# Patient Record
Sex: FEMALE | Race: WHITE | NOT HISPANIC OR LATINO | Employment: UNEMPLOYED | URBAN - METROPOLITAN AREA
[De-identification: names, ages, dates, MRNs, and addresses within clinical notes are randomized per-mention and may not be internally consistent; named-entity substitution may affect disease eponyms.]

---

## 2017-05-25 LAB
ABO GROUP BLD: NORMAL
BLD GP AB SCN SERPL QL: NORMAL
HBV SURFACE AG SERPL QL IA: NORMAL
HCT VFR BLD AUTO: NORMAL %
HGB BLD-MCNC: NORMAL G/DL
HIV 1 0 2 IC ZHVIC: NON REACTIVE
PLATELET # BLD AUTO: NORMAL 10*3/UL
RH BLD: NORMAL
RPR SER QL: NON REACTIVE
RUBV IGG SERPL IA-ACNC: NORMAL

## 2017-06-08 LAB
C TRACH DNA SPEC QL NAA+PROBE: NORMAL
N GONORRHOEA DNA SPEC QL NAA+PROBE: NORMAL
PHYSICIAN READ PAP  881411: NORMAL

## 2017-06-30 LAB — 2ND TRIMESTER 4 SCREEN SERPL-IMP: NORMAL

## 2017-08-30 LAB
GLUCOSE 1H P 50 G GLC PO SERPL-MCNC: NORMAL MG/DL
HCT VFR BLD AUTO: NORMAL %
HGB BLD-MCNC: NORMAL G/DL
PLATELET # BLD AUTO: NORMAL 10*3/UL

## 2017-10-18 ENCOUNTER — INITIAL PRENATAL (OUTPATIENT)
Dept: OBGYN | Facility: CLINIC | Age: 27
End: 2017-10-18
Payer: MEDICAID

## 2017-10-18 VITALS
WEIGHT: 196 LBS | HEIGHT: 67 IN | SYSTOLIC BLOOD PRESSURE: 126 MMHG | BODY MASS INDEX: 30.76 KG/M2 | DIASTOLIC BLOOD PRESSURE: 62 MMHG

## 2017-10-18 DIAGNOSIS — Z34.03 ENCOUNTER FOR SUPERVISION OF NORMAL FIRST PREGNANCY IN THIRD TRIMESTER: ICD-10-CM

## 2017-10-18 LAB
APPEARANCE UR: NORMAL
BILIRUB UR STRIP-MCNC: NORMAL MG/DL
COLOR UR AUTO: NORMAL
GLUCOSE UR STRIP.AUTO-MCNC: NEGATIVE MG/DL
KETONES UR STRIP.AUTO-MCNC: NEGATIVE MG/DL
LEUKOCYTE ESTERASE UR QL STRIP.AUTO: NEGATIVE
NITRITE UR QL STRIP.AUTO: NEGATIVE
PH UR STRIP.AUTO: 6.5 [PH] (ref 5–8)
PROT UR QL STRIP: NORMAL MG/DL
RBC UR QL AUTO: NORMAL
SP GR UR STRIP.AUTO: 1.01
UROBILINOGEN UR STRIP-MCNC: NORMAL MG/DL

## 2017-10-18 PROCEDURE — 59401 PR NEW OB VISIT: CPT | Performed by: NURSE PRACTITIONER

## 2017-10-18 PROCEDURE — 90472 IMMUNIZATION ADMIN EACH ADD: CPT | Performed by: NURSE PRACTITIONER

## 2017-10-18 PROCEDURE — 90471 IMMUNIZATION ADMIN: CPT | Performed by: NURSE PRACTITIONER

## 2017-10-18 PROCEDURE — 81002 URINALYSIS NONAUTO W/O SCOPE: CPT | Performed by: NURSE PRACTITIONER

## 2017-10-18 PROCEDURE — 90686 IIV4 VACC NO PRSV 0.5 ML IM: CPT | Performed by: NURSE PRACTITIONER

## 2017-10-18 PROCEDURE — 90715 TDAP VACCINE 7 YRS/> IM: CPT | Performed by: NURSE PRACTITIONER

## 2017-10-18 NOTE — PROGRESS NOTES
"Cc: New OB visit    HPI:  The patient is a 27 y.o.  32w3d based upo  Patient's last menstrual period was 2017. JUD 12/10/17, U/S at 12 2/7 17, JUD 17   She presents for her new obstetric visit.  She Reports  fetal movement,  denies  vaginal bleeding,  denies  leakage of fluid,  denies contractions.   She Denies nausea/vomiting, denies headache, denies dysuria, Denies vaginal discharge.  Father of baby involved:yes, family support: yes    OB History    Para Term  AB Living   3 2 2     2   SAB TAB Ectopic Molar Multiple Live Births             2      # Outcome Date GA Lbr Trevor/2nd Weight Sex Delivery Anes PTL Lv   3 Current            2 Term 11 39w0d  3.6 kg (7 lb 15 oz) F Vag-Spont EPI N JESSE      Birth Comments: Pt states no complications.    1 Term 10/18/08 39w0d  3.657 kg (8 lb 1 oz) M Vag-Spont EPI N JESSE      Birth Comments: Pt states no complications.         No past medical history on file.  No past surgical history on file.  Social History     Social History   • Marital status: Single     Spouse name: N/A   • Number of children: N/A   • Years of education: N/A     Occupational History   • Not on file.     Social History Main Topics   • Smoking status: Never Smoker   • Smokeless tobacco: Not on file   • Alcohol use No   • Drug use: No   • Sexual activity: Yes     Partners: Male      Comment: none     Other Topics Concern   • Not on file     Social History Narrative   • No narrative on file     Family History   Problem Relation Age of Onset   • Diabetes Mother    • Hypertension Mother    • Diabetes Father    • Hypertension Father    • No Known Problems Sister    • Diabetes Paternal Grandfather    • Hypertension Paternal Grandfather      Allergies:   Allergies as of 10/18/2017   • (No Known Allergies)       PE:    Blood pressure 126/62, height 1.702 m (5' 7\"), weight 88.9 kg (196 lb), last menstrual period 2017.    SVE:  No  Assessed today late transfer of care from " [facility]      see prenatal physical/limited exam     LABS: Pending: need report for 1 hour GTT and a recent U/S to recheck fetal kidneys per patient done at [facility]    A/P:  27 y.o.  32w3d based upon  Patient's last menstrual period was 2017..  She is here for her new obstetric visit.    1. Encounter for supervision of normal first pregnancy in third trimester  INFLUENZA VACCINE QUAD INJ >3Y(PF)    TDAP VACCINE =>6YO IM    Prenatal MV-Min-Fe Fum-FA-DHA (PRENATAL 1 PO)    POCT Urinalysis       1. Ultrasound for dates and anatomy complete. Bilateral fetal pylectesis repeat scan done at [facility] will request the records   2.  trimester screening for Down Syndrome and neural tube defects done negative screen  3.   prenatal labs complete except 1 hour gtt missing.  4.  NOB packet given with informational handouts.  5.  Increase water intake and encouraged healthy nutrition.  6.  Discussion of weight gain and proper exercise during pregnancy.  7.  Continue prenatal vitamins.  8.  Follow-up in 2 weeks.   9. Flu and Tdap vaccines are given today

## 2017-10-18 NOTE — LETTER
Cystic Fibrosis Carrier Testing  Sierra Villegas    The following information is about a blood test that can be done to determine if you and/or your partner carry the gene for cystic fibrosis.    WHAT IS CYSTIC FIBROSIS?  · Cystic fibrosis (CF) is an inherited disease that affects more than 25,000 [place] children and young adults.  · Symptoms of CF vary but include lung congestion, pneumonia, diarrhea and poor growth.  Most people with CF have severe medical problems and some die at a young age.  Others have so few symptoms they are unaware they have CF.  · CF does not affect intelligence.  · Although there is no cure for CF at this time, scientists are making progress in improving treatment and in searching for a cure.  In the past many people with CF  at a very young age.  Today, many are living into their 20’s and 30’s.    IS THERE A CHANCE MY BABY COULD HAVE CYSTIC FIBROSIS?  · You can have a child with CF even if there is no history in your family (see chart below).  · CF testing can help determine if you are a carrier and at risk to have a child with CF.  Note: if both parents are carriers, there is a 1 in 4 (25%) chance with each pregnancy that they will have a child with CF.  · Carriers have one normal CF gene and one altered CF gene.  · People with CF have two altered CF genes.  · Most people have two normal copies of the CF gene.    Approximate risk that a couple with no family history of cystic fibrosis will have a child with cystic fibrosis:    Ethnic background / Risk     couple:  1 in 2,500   couple:  1 in 15,000            couple:  1 in 8,000     American couple:  1 in 32,000     WHAT TESTING IS AVAILABLE?  · There is a blood test that can be done to find out if you or your partner is a carrier.  · It is important to understand that CF carrier testing does not detect all CF carriers.  · If the test shows that you are both CF carriers, you unborn baby can  be tested to find out if the baby has CF.    HOW MUCH DOES IT COST TO HAVE CYSTIC FIBROSIS CARRIER TESTING?  · Cost and insurance coverage for CF carrier testing vary depending upon the laboratory used and your insurance policy.  · The average cost for CF carrier testing is $300 per person.  · Your genetic counselor can provide you with more information about cystic fibrosis carrier testing.    _____  Yes, I am interested in discussing carrier testing with a genetic counselor.    _____  No, I am not interested in CF carrier testing or in receiving more information about CF carrier testing.      Client signature: ________________________________________  10/18/2017

## 2017-10-18 NOTE — PROGRESS NOTES
Pt. Here for NOB visit today.  Good FM, kick count sheet given along with instructions today.   # [phone]  Pt is transfer of care from [facility] records received.   Pt. States no complaints.   Pharmacy verified.   Tdap today   Flu vaccine today   BTL declined.

## 2017-10-18 NOTE — LETTER
"Count Your Baby's Movements  Another step to a healthy delivery    Sierra Martínezrhoda             Dept: [phone]    How Many Weeks Pregnant 32w3d    Date to Begin Counting: 10/18/17              How to use this chart    One way for your physician to keep track of your baby's health is by knowing how often the baby moves (or \"kicks\") in your womb.  You can help your physician to do this by using this chart every day.    Every day, you should see how many hours it takes for your baby to move 10 times.  Start in the morning, as soon as you get up.    · First, write down the time your baby moves until you get to 10.  · Check off one box every time your baby moves until you get to 10.  · Write down the time you finished counting in the last column.  · Total how long it took to count up all 10 movements.  · Finally, fill in the box that shows how long this took.  After counting 10 movements, you no longer have to count any more that day.  The next morning, just start counting again as soon as you get up.    What should you call a \"movement\"?  It is hard to say, because it will feel different from one mother to another and from one pregnancy to the next.  The important thing is that you count the movements the same way throughout your pregnancy.  If you have more questions, you should ask your physician.    Count carefully every day!  SAMPLE:  Week 28    How many hours did it take to feel 10 movements?       Start  Time     1     2     3     4     5     6     7     8     9     10   Finish Time   Mon 8:20 ·  ·  ·  ·  ·  ·  ·  ·  ·  ·  11:40   Tue Wed Thu Fri               Sat               Sun                 IMPORTANT: You should contact your physician if it takes more than two hours for you to feel 10 movements.  Each morning, write down the time and start to count the movements of your baby.  Keep track by checking off one box every time you feel one movement.  When you have " "felt 10 \"kicks\", write down the time you finished counting in the last column.  Then fill in the   box (over the check jag) for the number of hours it took.  Be sure to read the complete instructions on the previous page.            "

## 2017-10-18 NOTE — NON-PROVIDER
TDap given to patient. L  Deltoid. Screening checklist reviewed with patient. VIS given  Flu vaccine given. R  Deltoid. Screening checklist reviewed with patient. VIS given

## 2017-11-02 ENCOUNTER — ROUTINE PRENATAL (OUTPATIENT)
Dept: OBGYN | Facility: CLINIC | Age: 27
End: 2017-11-02
Payer: MEDICAID

## 2017-11-02 VITALS — WEIGHT: 196 LBS | DIASTOLIC BLOOD PRESSURE: 64 MMHG | BODY MASS INDEX: 30.7 KG/M2 | SYSTOLIC BLOOD PRESSURE: 104 MMHG

## 2017-11-02 DIAGNOSIS — Z34.03 ENCOUNTER FOR SUPERVISION OF NORMAL FIRST PREGNANCY IN THIRD TRIMESTER: ICD-10-CM

## 2017-11-02 PROCEDURE — 90040 PR PRENATAL FOLLOW UP: CPT | Performed by: NURSE PRACTITIONER

## 2017-11-02 NOTE — PROGRESS NOTES
S: Sierra Villegas at [id number] for OB  follow up visit.  Patient  Has complaints of today   Fetal movement is +  Denies any loss of fluid, vaginal bleeding or contractions.    O: VSS  Urine dip NE  See above values  Cervical exam NE    A: multip  34w4d  Transverse lie currently    P:   3rd  Trimester Guidance and comfort measures, diet and exercise review.  Labs:  None due   Fetal Kick Count  Continue   RTC in 1-2 week   Breech tilt exercises

## 2017-11-02 NOTE — PROGRESS NOTES
Pt here today for OB follow up  Pt states no complaints   Reports +  Good # [phone]  Pharmacy Confirmed.

## 2017-11-16 ENCOUNTER — HOSPITAL ENCOUNTER (OUTPATIENT)
Facility: MEDICAL CENTER | Age: 27
End: 2017-11-16
Attending: NURSE PRACTITIONER
Payer: MEDICAID

## 2017-11-16 ENCOUNTER — ROUTINE PRENATAL (OUTPATIENT)
Dept: OBGYN | Facility: CLINIC | Age: 27
End: 2017-11-16
Payer: MEDICAID

## 2017-11-16 VITALS — BODY MASS INDEX: 30.54 KG/M2 | DIASTOLIC BLOOD PRESSURE: 62 MMHG | WEIGHT: 195 LBS | SYSTOLIC BLOOD PRESSURE: 100 MMHG

## 2017-11-16 DIAGNOSIS — Z34.03 ENCOUNTER FOR SUPERVISION OF NORMAL FIRST PREGNANCY IN THIRD TRIMESTER: ICD-10-CM

## 2017-11-16 PROCEDURE — 90040 PR PRENATAL FOLLOW UP: CPT | Performed by: NURSE PRACTITIONER

## 2017-11-16 PROCEDURE — 87653 STREP B DNA AMP PROBE: CPT

## 2017-11-16 NOTE — PROGRESS NOTES
SUBJECTIVE:  Pt is a 27 y.o.   at 36w4d  gestation. Presents today for follow-up prenatal care. Reports no issues at this time.  Reports good fetal movement. Denies cramping/contractions, bleeding or leaking of fluid. Denies dysuria, headaches, N/V, or other issues at this time. Generally feels well today.     OBJECTIVE:  - See prenatal vitals flow  Vitals:    17 1313   BP: 100/62   Weight: 88.5 kg (195 lb)            ASSESSMENT:   - IUP at 36w4d by LMP which is consistent with 12  week US   - S=D  Patient Active Problem List    Diagnosis Date Noted   • Encounter for supervision of normal first pregnancy in third trimester 10/18/2017         PLAN:  - GBS test today   - S/sx pregnancy and labor warning signs vs general discomforts discussed  - Fetal movements and kick counts reviewed   - Adequate hydration reinforced  - Nutrition/exercise/vitamin education: continued PNV  - Plans for breastfeeding  - Plans LARC for contraception Pp: handout given and reviewed  - S/p TDAP vacc  - S/p Flu vacc  - Anticipatory guidance given  - RTC in 1 week for follow-up prenatal care

## 2017-11-16 NOTE — PROGRESS NOTES
Ob f/u. + fetal movement baby is moving ok   No VB, LOF or contractions   C/O no complaints today   Phone number # [phone]  Pharmacy verified with patient  GS=087 lbs              YM=405/62  GBS today

## 2017-11-18 LAB — GP B STREP DNA SPEC QL NAA+PROBE: NEGATIVE

## 2017-11-28 ENCOUNTER — ROUTINE PRENATAL (OUTPATIENT)
Dept: OBGYN | Facility: CLINIC | Age: 27
End: 2017-11-28
Payer: MEDICAID

## 2017-11-28 VITALS — WEIGHT: 199 LBS | DIASTOLIC BLOOD PRESSURE: 70 MMHG | SYSTOLIC BLOOD PRESSURE: 118 MMHG | BODY MASS INDEX: 31.17 KG/M2

## 2017-11-28 DIAGNOSIS — Z34.83 ENCOUNTER FOR SUPERVISION OF OTHER NORMAL PREGNANCY IN THIRD TRIMESTER: ICD-10-CM

## 2017-11-28 PROCEDURE — 90040 PR PRENATAL FOLLOW UP: CPT | Performed by: NURSE PRACTITIONER

## 2017-11-28 NOTE — PROGRESS NOTES
Pt here today for OB follow up  Pt states no complaints   Reports +FM  Good # [phone]  Pharmacy Confirmed.  GBS Negative

## 2017-11-28 NOTE — PROGRESS NOTES
S: Sierra Villegas at [id number] for OB  follow up visit.  Patient  Has complaints of contractions but nothing in a pattern so far  Fetal movement is +  Denies any loss of fluid, vaginal bleeding or contractions.    O: VSS  Urine dip  NE  See above values  Cervical exam FT/20/-4 ballot  U/S confirms breech presentation     A: multip 38w2d   breech presentation      P:   3rd  Trimester Guidance and comfort measures, diet and exercise review.  Labs:  None due   Fetal Kick Count continune  RTC in 1 week  Labor s/s reviewed   Order for C/S is placed and will call  today

## 2017-11-29 ENCOUNTER — ROUTINE PRENATAL (OUTPATIENT)
Dept: OBGYN | Facility: CLINIC | Age: 27
End: 2017-11-29
Payer: MEDICAID

## 2017-11-29 VITALS — DIASTOLIC BLOOD PRESSURE: 64 MMHG | SYSTOLIC BLOOD PRESSURE: 118 MMHG | BODY MASS INDEX: 31.17 KG/M2 | WEIGHT: 199 LBS

## 2017-11-29 PROCEDURE — 90040 PR PRENATAL FOLLOW UP: CPT | Performed by: OBSTETRICS & GYNECOLOGY

## 2017-11-29 NOTE — PROGRESS NOTES
Pt. Here for Pre Op visit today, with Dr. Arboleda, c/s scheduled for 12/5/17 @ 0930 am.   Pt. Reports Good FM.  Good # [phone]  Pt states no complaints.   Pharmacy verified.   Patient is scheduled for C/S on 12/05/17 at 9:30am with Dr Vamshi Ross, pt notified and given instructions today.

## 2017-11-29 NOTE — PROGRESS NOTES
PRE-OP  Patient is at 38w3d. Doing well. Good FM, no contractions, no LOF< no VB  Patients' weight gain, fluid intake and exercise level discussed.Vitals, fundal height , fetal position, and FHR reviewed on flowsheet.    .../64   Wt 90.3 kg (199 lb)   LMP 2017   BMI 31.17 kg/m²   No past medical history on file.  Patient Active Problem List    Diagnosis Date Noted   • Breech presentation 2017   • Encounter for supervision of normal first pregnancy in third trimester 10/18/2017     Lab:  Recent Results (from the past 336 hour(s))   GRP B STREP, BY PCR (SY BROTH)    Collection Time: 17  1:35 PM   Result Value Ref Range    Strep Gp B DNA PCR Negative Negative     Assessment: 27 year old ; breech  1  38w3d  2. Doing well  3. Size equals Dates and/or Scan  4. Weight gain: normal: Yes, excessive:No                    Plan:  1. Discussed with the patient indications for  delivery. The patient voiced understanding of indications for  section at this time.    Discussed with the patient the risks of  delivery. The risks include infection, bleeding, scarring, damage to other organs in the area of operation. Specifically organs that can be damaged are bowel, bladder, ureters. I also discussed with the patient the risk of wound infection and wound breakdown. We discussed that these risks are greater in people that have diabetes or obesity. I also discussed the risk of emergency blood transfusion during procedure as well as emergency hysterectomy during procedure.    Patient had the opportunity to ask questions regarding procedures. All questions answered to the patient's satisfaction.  Proceed with  delivery on Dec 5, 2017.  2. Increase water intake   3. Continue vitamins.  4. Kick counts as instructed.  5. Labor precautions

## 2017-11-29 NOTE — PROGRESS NOTES
Patient is scheduled for Pre OP on 11/29/17 at 11:15 with Dr Vamshi Ross  Patient is scheduled for C/S on 12/05/17 at 9:30am with Dr Vamshi Ross  Pt has been notified, please give instructions.

## 2017-12-05 ENCOUNTER — HOSPITAL ENCOUNTER (INPATIENT)
Facility: MEDICAL CENTER | Age: 27
LOS: 3 days | End: 2017-12-08
Attending: OBSTETRICS & GYNECOLOGY | Admitting: OBSTETRICS & GYNECOLOGY
Payer: MEDICAID

## 2017-12-05 LAB
APPEARANCE UR: ABNORMAL
BACTERIA #/AREA URNS HPF: ABNORMAL /HPF
BASOPHILS # BLD AUTO: 0.5 % (ref 0–1.8)
BASOPHILS # BLD: 0.06 K/UL (ref 0–0.12)
BILIRUB UR QL STRIP.AUTO: NEGATIVE
COLOR UR: YELLOW
EOSINOPHIL # BLD AUTO: 0.17 K/UL (ref 0–0.51)
EOSINOPHIL NFR BLD: 1.3 % (ref 0–6.9)
EPI CELLS #/AREA URNS HPF: ABNORMAL /HPF
ERYTHROCYTE [DISTWIDTH] IN BLOOD BY AUTOMATED COUNT: 42.9 FL (ref 35.9–50)
GLUCOSE UR STRIP.AUTO-MCNC: NEGATIVE MG/DL
HCT VFR BLD AUTO: 37.9 % (ref 37–47)
HGB BLD-MCNC: 12.8 G/DL (ref 12–16)
HOLDING TUBE BB 8507: NORMAL
HYALINE CASTS #/AREA URNS LPF: ABNORMAL /LPF
IMM GRANULOCYTES # BLD AUTO: 0.19 K/UL (ref 0–0.11)
IMM GRANULOCYTES NFR BLD AUTO: 1.4 % (ref 0–0.9)
KETONES UR STRIP.AUTO-MCNC: NEGATIVE MG/DL
LEUKOCYTE ESTERASE UR QL STRIP.AUTO: ABNORMAL
LYMPHOCYTES # BLD AUTO: 2.21 K/UL (ref 1–4.8)
LYMPHOCYTES NFR BLD: 16.7 % (ref 22–41)
MCH RBC QN AUTO: 32 PG (ref 27–33)
MCHC RBC AUTO-ENTMCNC: 33.8 G/DL (ref 33.6–35)
MCV RBC AUTO: 94.8 FL (ref 81.4–97.8)
MICRO URNS: ABNORMAL
MONOCYTES # BLD AUTO: 0.91 K/UL (ref 0–0.85)
MONOCYTES NFR BLD AUTO: 6.9 % (ref 0–13.4)
NEUTROPHILS # BLD AUTO: 9.71 K/UL (ref 2–7.15)
NEUTROPHILS NFR BLD: 73.2 % (ref 44–72)
NITRITE UR QL STRIP.AUTO: NEGATIVE
NRBC # BLD AUTO: 0 K/UL
NRBC BLD AUTO-RTO: 0 /100 WBC
PH UR STRIP.AUTO: 8 [PH]
PLATELET # BLD AUTO: 277 K/UL (ref 164–446)
PMV BLD AUTO: 9.9 FL (ref 9–12.9)
PROT UR QL STRIP: NEGATIVE MG/DL
RBC # BLD AUTO: 4 M/UL (ref 4.2–5.4)
RBC # URNS HPF: ABNORMAL /HPF
RBC UR QL AUTO: NEGATIVE
SP GR UR STRIP.AUTO: 1.02
TRANS CELLS #/AREA URNS HPF: ABNORMAL /HPF
UROBILINOGEN UR STRIP.AUTO-MCNC: 1 MG/DL
WBC # BLD AUTO: 13.3 K/UL (ref 4.8–10.8)
WBC #/AREA URNS HPF: ABNORMAL /HPF

## 2017-12-05 PROCEDURE — 304964 HCHG RECOVERY ROOM TIME 1HR

## 2017-12-05 PROCEDURE — 700105 HCHG RX REV CODE 258: Performed by: ANESTHESIOLOGY

## 2017-12-05 PROCEDURE — 4A1HX4Z MONITORING OF PRODUCTS OF CONCEPTION, CARDIAC ELECTRICAL ACTIVITY, EXTERNAL APPROACH: ICD-10-PCS | Performed by: OBSTETRICS & GYNECOLOGY

## 2017-12-05 PROCEDURE — 700102 HCHG RX REV CODE 250 W/ 637 OVERRIDE(OP): Performed by: ANESTHESIOLOGY

## 2017-12-05 PROCEDURE — 770002 HCHG ROOM/CARE - OB PRIVATE (112)

## 2017-12-05 PROCEDURE — 305385 HCHG SURGICAL SERVICES 1/4 HOUR

## 2017-12-05 PROCEDURE — 700101 HCHG RX REV CODE 250

## 2017-12-05 PROCEDURE — 81001 URINALYSIS AUTO W/SCOPE: CPT

## 2017-12-05 PROCEDURE — 306828 HCHG ANES-TIME GENERAL: Performed by: OBSTETRICS & GYNECOLOGY

## 2017-12-05 PROCEDURE — 36415 COLL VENOUS BLD VENIPUNCTURE: CPT

## 2017-12-05 PROCEDURE — 700111 HCHG RX REV CODE 636 W/ 250 OVERRIDE (IP)

## 2017-12-05 PROCEDURE — 85025 COMPLETE CBC W/AUTO DIFF WBC: CPT

## 2017-12-05 PROCEDURE — 76815 OB US LIMITED FETUS(S): CPT

## 2017-12-05 PROCEDURE — A9270 NON-COVERED ITEM OR SERVICE: HCPCS | Performed by: ANESTHESIOLOGY

## 2017-12-05 PROCEDURE — 59514 CESAREAN DELIVERY ONLY: CPT

## 2017-12-05 PROCEDURE — 700111 HCHG RX REV CODE 636 W/ 250 OVERRIDE (IP): Performed by: ANESTHESIOLOGY

## 2017-12-05 RX ORDER — MISOPROSTOL 200 UG/1
800 TABLET ORAL
Status: DISCONTINUED | OUTPATIENT
Start: 2017-12-05 | End: 2017-12-05 | Stop reason: HOSPADM

## 2017-12-05 RX ORDER — HYDROMORPHONE HYDROCHLORIDE 2 MG/ML
0.2 INJECTION, SOLUTION INTRAMUSCULAR; INTRAVENOUS; SUBCUTANEOUS
Status: DISCONTINUED | OUTPATIENT
Start: 2017-12-05 | End: 2017-12-06

## 2017-12-05 RX ORDER — DIPHENHYDRAMINE HYDROCHLORIDE 50 MG/ML
12.5 INJECTION INTRAMUSCULAR; INTRAVENOUS EVERY 6 HOURS PRN
Status: DISCONTINUED | OUTPATIENT
Start: 2017-12-05 | End: 2017-12-06

## 2017-12-05 RX ORDER — DIPHENHYDRAMINE HYDROCHLORIDE 50 MG/ML
25 INJECTION INTRAMUSCULAR; INTRAVENOUS EVERY 6 HOURS PRN
Status: DISCONTINUED | OUTPATIENT
Start: 2017-12-05 | End: 2017-12-06

## 2017-12-05 RX ORDER — DOCUSATE SODIUM 100 MG/1
100 CAPSULE, LIQUID FILLED ORAL 2 TIMES DAILY PRN
Status: DISCONTINUED | OUTPATIENT
Start: 2017-12-05 | End: 2017-12-08 | Stop reason: HOSPADM

## 2017-12-05 RX ORDER — CITRIC ACID/SODIUM CITRATE 334-500MG
30 SOLUTION, ORAL ORAL ONCE
Status: COMPLETED | OUTPATIENT
Start: 2017-12-05 | End: 2017-12-05

## 2017-12-05 RX ORDER — NALOXONE HYDROCHLORIDE 0.4 MG/ML
0.1 INJECTION, SOLUTION INTRAMUSCULAR; INTRAVENOUS; SUBCUTANEOUS PRN
Status: DISCONTINUED | OUTPATIENT
Start: 2017-12-05 | End: 2017-12-06

## 2017-12-05 RX ORDER — SODIUM CHLORIDE, SODIUM LACTATE, POTASSIUM CHLORIDE, CALCIUM CHLORIDE 600; 310; 30; 20 MG/100ML; MG/100ML; MG/100ML; MG/100ML
INJECTION, SOLUTION INTRAVENOUS CONTINUOUS
Status: DISCONTINUED | OUTPATIENT
Start: 2017-12-05 | End: 2017-12-05 | Stop reason: HOSPADM

## 2017-12-05 RX ORDER — CEFAZOLIN SODIUM 1 G/3ML
2 INJECTION, POWDER, FOR SOLUTION INTRAMUSCULAR; INTRAVENOUS ONCE
Status: DISCONTINUED | OUTPATIENT
Start: 2017-12-05 | End: 2017-12-05 | Stop reason: HOSPADM

## 2017-12-05 RX ORDER — VITAMIN A ACETATE, BETA CAROTENE, ASCORBIC ACID, CHOLECALCIFEROL, .ALPHA.-TOCOPHEROL ACETATE, DL-, THIAMINE MONONITRATE, RIBOFLAVIN, NIACINAMIDE, PYRIDOXINE HYDROCHLORIDE, FOLIC ACID, CYANOCOBALAMIN, CALCIUM CARBONATE, FERROUS FUMARATE, ZINC OXIDE, CUPRIC OXIDE 3080; 12; 120; 400; 1; 1.84; 3; 20; 22; 920; 25; 200; 27; 10; 2 [IU]/1; UG/1; MG/1; [IU]/1; MG/1; MG/1; MG/1; MG/1; MG/1; [IU]/1; MG/1; MG/1; MG/1; MG/1; MG/1
1 TABLET, FILM COATED ORAL EVERY MORNING
Status: DISCONTINUED | OUTPATIENT
Start: 2017-12-05 | End: 2017-12-08 | Stop reason: HOSPADM

## 2017-12-05 RX ORDER — SODIUM CHLORIDE, SODIUM LACTATE, POTASSIUM CHLORIDE, CALCIUM CHLORIDE 600; 310; 30; 20 MG/100ML; MG/100ML; MG/100ML; MG/100ML
1500 INJECTION, SOLUTION INTRAVENOUS ONCE
Status: COMPLETED | OUTPATIENT
Start: 2017-12-05 | End: 2017-12-05

## 2017-12-05 RX ORDER — HYDROMORPHONE HYDROCHLORIDE 2 MG/ML
0.4 INJECTION, SOLUTION INTRAMUSCULAR; INTRAVENOUS; SUBCUTANEOUS
Status: DISCONTINUED | OUTPATIENT
Start: 2017-12-05 | End: 2017-12-06

## 2017-12-05 RX ORDER — BISACODYL 10 MG
10 SUPPOSITORY, RECTAL RECTAL PRN
Status: DISCONTINUED | OUTPATIENT
Start: 2017-12-05 | End: 2017-12-08 | Stop reason: HOSPADM

## 2017-12-05 RX ORDER — ONDANSETRON 2 MG/ML
4 INJECTION INTRAMUSCULAR; INTRAVENOUS EVERY 6 HOURS PRN
Status: DISCONTINUED | OUTPATIENT
Start: 2017-12-05 | End: 2017-12-06

## 2017-12-05 RX ORDER — OXYCODONE AND ACETAMINOPHEN 10; 325 MG/1; MG/1
1 TABLET ORAL EVERY 4 HOURS PRN
Status: DISCONTINUED | OUTPATIENT
Start: 2017-12-05 | End: 2017-12-06

## 2017-12-05 RX ORDER — CARBOPROST TROMETHAMINE 250 UG/ML
250 INJECTION, SOLUTION INTRAMUSCULAR
Status: DISCONTINUED | OUTPATIENT
Start: 2017-12-05 | End: 2017-12-05 | Stop reason: HOSPADM

## 2017-12-05 RX ORDER — MAG HYDROX/ALUMINUM HYD/SIMETH 400-400-40
1 SUSPENSION, ORAL (FINAL DOSE FORM) ORAL
Status: DISCONTINUED | OUTPATIENT
Start: 2017-12-05 | End: 2017-12-08 | Stop reason: HOSPADM

## 2017-12-05 RX ORDER — KETOROLAC TROMETHAMINE 30 MG/ML
30 INJECTION, SOLUTION INTRAMUSCULAR; INTRAVENOUS EVERY 6 HOURS
Status: DISCONTINUED | OUTPATIENT
Start: 2017-12-05 | End: 2017-12-06

## 2017-12-05 RX ORDER — OXYCODONE HYDROCHLORIDE AND ACETAMINOPHEN 5; 325 MG/1; MG/1
1 TABLET ORAL EVERY 4 HOURS PRN
Status: DISCONTINUED | OUTPATIENT
Start: 2017-12-05 | End: 2017-12-06

## 2017-12-05 RX ORDER — SODIUM CHLORIDE, SODIUM LACTATE, POTASSIUM CHLORIDE, CALCIUM CHLORIDE 600; 310; 30; 20 MG/100ML; MG/100ML; MG/100ML; MG/100ML
INJECTION, SOLUTION INTRAVENOUS PRN
Status: DISCONTINUED | OUTPATIENT
Start: 2017-12-05 | End: 2017-12-08 | Stop reason: HOSPADM

## 2017-12-05 RX ORDER — METHYLERGONOVINE MALEATE 0.2 MG/ML
0.2 INJECTION INTRAVENOUS
Status: DISCONTINUED | OUTPATIENT
Start: 2017-12-05 | End: 2017-12-05 | Stop reason: HOSPADM

## 2017-12-05 RX ADMIN — SODIUM CHLORIDE, POTASSIUM CHLORIDE, SODIUM LACTATE AND CALCIUM CHLORIDE 1500 ML: 600; 310; 30; 20 INJECTION, SOLUTION INTRAVENOUS at 08:56

## 2017-12-05 RX ADMIN — KETOROLAC TROMETHAMINE 30 MG: 30 INJECTION, SOLUTION INTRAMUSCULAR at 23:10

## 2017-12-05 RX ADMIN — FAMOTIDINE 20 MG: 10 INJECTION, SOLUTION INTRAVENOUS at 09:18

## 2017-12-05 RX ADMIN — OXYCODONE AND ACETAMINOPHEN 1 TABLET: 5; 325 TABLET ORAL at 20:13

## 2017-12-05 RX ADMIN — SODIUM CITRATE AND CITRIC ACID MONOHYDRATE 30 ML: 500; 334 SOLUTION ORAL at 09:19

## 2017-12-05 RX ADMIN — KETOROLAC TROMETHAMINE 30 MG: 30 INJECTION, SOLUTION INTRAMUSCULAR at 16:24

## 2017-12-05 ASSESSMENT — PAIN SCALES - GENERAL
PAINLEVEL_OUTOF10: 0
PAINLEVEL_OUTOF10: 5
PAINLEVEL_OUTOF10: 4
PAINLEVEL_OUTOF10: 3
PAINLEVEL_OUTOF10: 0
PAINLEVEL_OUTOF10: 1
PAINLEVEL_OUTOF10: 0
PAINLEVEL_OUTOF10: 0
PAINLEVEL_OUTOF10: 1
PAINLEVEL_OUTOF10: 0
PAINLEVEL_OUTOF10: 0

## 2017-12-05 ASSESSMENT — PATIENT HEALTH QUESTIONNAIRE - PHQ9
2. FEELING DOWN, DEPRESSED, IRRITABLE, OR HOPELESS: NOT AT ALL
1. LITTLE INTEREST OR PLEASURE IN DOING THINGS: NOT AT ALL
SUM OF ALL RESPONSES TO PHQ QUESTIONS 1-9: 0
SUM OF ALL RESPONSES TO PHQ9 QUESTIONS 1 AND 2: 0

## 2017-12-05 ASSESSMENT — LIFESTYLE VARIABLES
EVER_SMOKED: NEVER
ALCOHOL_USE: NO
EVER_SMOKED: NEVER
DO YOU DRINK ALCOHOL: NO
DO YOU DRINK ALCOHOL: NO

## 2017-12-05 NOTE — OP REPORT
DATE OF SERVICE:  17     PREOPERATIVE DIAGNOSES:  1.  Intrauterine pregnancy at 39w2d  2.  Jay Jay Breech fetal presentation  3.  Declined BTL  4.  GBS neg     POSTOPERATIVE DIAGNOSES:  1.  Intrauterine pregnancy at 39w2d  2.  same    PROCEDURE PERFORMED:  primary low transverse  section.     SURGEON:  Michelle Cisneros MD     ASSISTANT: Vamshi Ross MD     ANESTHESIA:  Spinal.     ANESTHESIOLOGIST:  Elijah Claros MD     SPECIMEN:  none     ESTIMATED BLOOD LOSS:  800 mL     FINDINGS:  A viable female infant, Apgars of 8 and 9 in jay jay breech   presentation with normal clear amniotic fluid.  There was a normal uterus,   tubes, and ovaries bilaterally.     COMPLICATIONS:  None.     PROCEDURE:  After appropriate consents were obtained, the patient was taken to the operating room where spinal  anesthesia was applied without complications.  Jay Jay breech presentation was verified in the pre-op area.  The patient was then prepped and draped in the usual sterile manner.  Clamp test on the skin verified adequate anesthesia.  A Pfannenstiel incision was made with a  scalpel 3cm superior to the pubic symphysis and extended down to the rectus fascia.  The rectus fascia was incised with the scalpel and tented up. The underlying rectus muscle was  from the fascia first superiorly then inferiorly.  The rectus muscle was  bluntly in the midline.  The peritoneum was entered bluntly in the midline. The peritoneum incision was extended superiorly and inferiorly with  care to avoid injury to underlying bowel or bladder.  The vesicouterine  peritoneum was tented up and entered with Metzenbaum scissors, and a bladder flap was created.  Bladder blade was placed.  An incision was made into the lower uterine segment transversely and incision was extended bluntly.  Amniotomy was performed and there was noted to be clear amniotic fluid.   The infant was in a jay jay breech presentation.  The buttocks was delivered  followed by the trunk of the body.  Once the scapula was seen, the arms were swept forward into the hysterotomy incision and then the head was delivered  without any complications.  The mouth and nares were suctioned. The cord was   doubly clamped and cut and the infant was handed off to awaiting neonatology team.  The placenta was then allowed to spontaneously deliver. The uterus was exteriorized and cleared of clots and debris.  The hysterotomy incision was reapproximated with 1-0 Vicryl suture in a running locked fashion. A second layer of the same suture was placed to imbricate the incision creating a 2 layer closure.  There was a bleeding sinus vessel on the left lateral aspect of the incision which was repaired w/ 3-0 vicryl suture in a figure of 8 pattern.  Hemostasis was noted.  The tubes and ovaries were examined and noted to be normal.  The pericolic gutters were examined and any blood clots were removed.  Uterus returned to the abdomen.  The peritoneum was reapproximated with 3-0 Vicryl suture running.  The rectus muscles were examined and hemostatic.  The fascia was reapproximated with 1-0 Vicryl suture running.  The subcutaneous fat was irrigated and any small bleeders were bovied for hemostasis.  The subcutaneous fat was then reapproximated with 3-0 Vicryl suture running.  The skin was reapproximated with 4-0 Monocryl suture running. Steri strips and a dressing were placed.  Sponge, needle, instrument, and lap counts were correct x2.  Patient tolerated the procedure well and went to recovery room in stable condition.        ____________________________________     Michelle Cisneros MD

## 2017-12-05 NOTE — H&P
History and Physical      Sierra Villegas is a 27 y.o. year old female  at 39w2d who presents for primary c/s for breech presentation.     Subjective:   negative  For CTXS.   negative Feels pain   negative for LOF  negative for vaginal bleeding.   positive for fetal movement    ROS: A comprehensive review of systems was negative.    No past medical history on file.  No past surgical history on file.  OB History    Para Term  AB Living   3 2 2     2   SAB TAB Ectopic Molar Multiple Live Births             2      # Outcome Date GA Lbr Trevor/2nd Weight Sex Delivery Anes PTL Lv   3 Current            2 Term 11 39w0d  3.6 kg (7 lb 15 oz) F Vag-Spont EPI N JESSE      Birth Comments: Pt states no complications.    1 Term 10/18/08 39w0d  3.657 kg (8 lb 1 oz) M Vag-Spont EPI N JESSE      Birth Comments: Pt states no complications.         Social History     Social History   • Marital status: Single     Spouse name: N/A   • Number of children: N/A   • Years of education: N/A     Occupational History   • Not on file.     Social History Main Topics   • Smoking status: Never Smoker   • Smokeless tobacco: Not on file   • Alcohol use No   • Drug use: No   • Sexual activity: Yes     Partners: Male      Comment: none     Other Topics Concern   • Not on file     Social History Narrative   • No narrative on file     Allergies: Patient has no known allergies.    Current Facility-Administered Medications:   •  lactated ringers (LR) infusion, , Intravenous, Continuous, Talita Ambrosio M.D.  •  LR infusion, , Intravenous, Continuous, Talita Ambrosio M.D.  •  cefazolin (ANCEF) injection 2 g, 2 g, Intravenous, Once, Talita Ambrosio M.D.  •  misoprostol (CYTOTEC) tablet 800 mcg, 800 mcg, Rectal, Once PRN, Talita Ambrosio M.D.  •  methylergonovine (METHERGINE) injection 0.2 mg, 0.2 mg, Intramuscular, Once PRN, Talita Ambrosio M.D.  •  carboPROST (HEMABATE) injection 250 mcg, 250 mcg, Intramuscular, Once PRN, Talita Ambrosio  "M.D.    Prenatal care with [facility] starting at 32w3d and prior care at [facility] with following problems:  Patient Active Problem List    Diagnosis Date Noted   • Breech presentation 11/29/2017   • Encounter for supervision of normal first pregnancy in third trimester 10/18/2017         Objective:      Height 1.702 m (5' 7\"), weight 90.3 kg (199 lb), last menstrual period 03/05/2017.    General:   no acute distress   Skin:   normal   HEENT:  Sclera clear, anicteric   Lungs:   CTA bilateral   Heart:   S1, S2 normal, no murmur, click, rub or gallop, regular rate and rhythm, chest is clear without rales or wheezing, no pedal edema, no JVD, no hepatosplenomegaly   Abdomen:   gravid, NT   EFW:  3500-3800g   Pelvis:  adequate with gynecoid pelvis   FHT:  140 BPM   Uterine Size: S=D   Presentations: Breech   Cervix:     Dilation: Not examined    Effacement: Not examined    Station:  Not examined    Consistency: Not examined    Position: Not examined     Lab Review  Lab:   Blood type: A     Recent Results (from the past 5880 hour(s))   ABO AND RH DETERMINATION    Collection Time: 05/25/17 12:00 AM   Result Value Ref Range    Rh Grouping Only POS     ABO Grouping Only A    ANTIBODY SCREEN    Collection Time: 05/25/17 12:00 AM   Result Value Ref Range    Antibody Screen Scrn NEG    PLATELET COUNT    Collection Time: 05/25/17 12:00 AM   Result Value Ref Range    Platelet Count 327  k/uL    RUBELLA ABS IGG    Collection Time: 05/25/17 12:00 AM   Result Value Ref Range    Rubella IgG Antibody IMMUNE    RPR (SYPHILIS)    Collection Time: 05/25/17 12:00 AM   Result Value Ref Range    Rapid Plasma Reagin -Rpr- NON REACTIVE    HCT    Collection Time: 05/25/17 12:00 AM   Result Value Ref Range    Hematocrit 40.4  %    HGB    Collection Time: 05/25/17 12:00 AM   Result Value Ref Range    Hemoglobin 13.3  g/dL    HIV ANTIBODIES    Collection Time: 05/25/17 12:00 AM   Result Value Ref Range    HIV 1,0,2 IC NON REACTIVE    HEP B SURFACE " ANTIGEN    Collection Time: 05/25/17 12:00 AM   Result Value Ref Range    Hepatitis B Surface Antigen NEG    PAP IG, RFX HPV ASCU    Collection Time: 06/08/17 12:00 AM   Result Value Ref Range    Physician Read Pap NEG    GC DNA PROBE    Collection Time: 06/08/17 12:00 AM   Result Value Ref Range    Gc By Dna Probe NEG    CHLAMYDIA DNA PROBE    Collection Time: 06/08/17 12:00 AM   Result Value Ref Range    Chlamydia By Dna Probe NEG    AFP TETRA    Collection Time: 06/30/17 12:00 AM   Result Value Ref Range    Interpretation NEG    GLUCOSE 1HR GESTATIONAL    Collection Time: 08/30/17 12:00 AM   Result Value Ref Range    Glucose, Post Dose 125  mg/dL    PLATELET COUNT    Collection Time: 08/30/17 12:00 AM   Result Value Ref Range    Platelet Count 305  k/uL    HCT    Collection Time: 08/30/17 12:00 AM   Result Value Ref Range    Hematocrit 33.4  %    HGB    Collection Time: 08/30/17 12:00 AM   Result Value Ref Range    Hemoglobin 11.4  g/dL    POCT Urinalysis    Collection Time: 10/18/17  9:23 AM   Result Value Ref Range    POC Color  Negative    POC Appearance  Negative    POC Leukocyte Esterase negative Negative    POC Nitrites negative Negative    POC Urobiligen  Negative (0.2) mg/dL    POC Protein trace Negative mg/dL    POC Urine PH 6.5 5.0 - 8.0    POC Blood small Negative    POC Specific Gravity 1.015 <1.005 - >1.030    POC Ketones negative Negative mg/dL    POC Biliruben  Negative mg/dL    POC Glucose negative Negative mg/dL   GRP B STREP, BY PCR (SY BROTH)    Collection Time: 11/16/17  1:35 PM   Result Value Ref Range    Strep Gp B DNA PCR Negative Negative   URINALYSIS    Collection Time: 12/05/17  7:45 AM   Result Value Ref Range    Color Yellow     Character Cloudy (A)     Specific Gravity 1.020 <1.035    Ph 8.0 5.0 - 8.0    Glucose Negative Negative mg/dL    Ketones Negative Negative mg/dL    Protein Negative Negative mg/dL    Bilirubin Negative Negative    Urobilinogen, Urine 1.0 Negative    Nitrite  Negative Negative    Leukocyte Esterase Small (A) Negative    Occult Blood Negative Negative    Micro Urine Req Microscopic    CBC WITH DIFFERENTIAL    Collection Time: 12/05/17  8:05 AM   Result Value Ref Range    WBC 13.3 (H) 4.8 - 10.8 K/uL    RBC 4.00 (L) 4.20 - 5.40 M/uL    Hemoglobin 12.8 12.0 - 16.0 g/dL    Hematocrit 37.9 37.0 - 47.0 %    MCV 94.8 81.4 - 97.8 fL    MCH 32.0 27.0 - 33.0 pg    MCHC 33.8 33.6 - 35.0 g/dL    RDW 42.9 35.9 - 50.0 fL    Platelet Count 277 164 - 446 K/uL    MPV 9.9 9.0 - 12.9 fL    Neutrophils-Polys 73.20 (H) 44.00 - 72.00 %    Lymphocytes 16.70 (L) 22.00 - 41.00 %    Monocytes 6.90 0.00 - 13.40 %    Eosinophils 1.30 0.00 - 6.90 %    Basophils 0.50 0.00 - 1.80 %    Immature Granulocytes 1.40 (H) 0.00 - 0.90 %    Nucleated RBC 0.00 /100 WBC    Neutrophils (Absolute) 9.71 (H) 2.00 - 7.15 K/uL    Lymphs (Absolute) 2.21 1.00 - 4.80 K/uL    Monos (Absolute) 0.91 (H) 0.00 - 0.85 K/uL    Eos (Absolute) 0.17 0.00 - 0.51 K/uL    Baso (Absolute) 0.06 0.00 - 0.12 K/uL    Immature Granulocytes (abs) 0.19 (H) 0.00 - 0.11 K/uL    NRBC (Absolute) 0.00 K/uL   Hold Blood Bank Specimen (Not Tested)    Collection Time: 12/05/17  8:05 AM   Result Value Ref Range    Holding Tube - Bb DONE         Assessment:   Sierra Villegas at 39w2d  Labor status: Not in labor.  Obstetrical history significant for   Patient Active Problem List    Diagnosis Date Noted   • Breech presentation 11/29/2017   • Encounter for supervision of normal first pregnancy in third trimester 10/18/2017   .      Plan:     Admit to L&D   GBS negative  Ultrasound performed today, breech presentation  C/S rosalio be performed today  IV Fluids

## 2017-12-05 NOTE — OR SURGEON
Immediate Post OP Note    PreOp Diagnosis: IUP at 39.2wks, jay jay breech fetal presentation, declines BTL, GBS neg    PostOp Diagnosis: same    Procedure(s):  PRIMARY C SECTION - Wound Class: Clean Contaminated    Surgeon(s):  Vandana Cueto M.D.    Anesthesiologist/Type of Anesthesia:  Anesthesiologist: Elijah Claros M.D./Spinal    Surgical Staff:  Circulator: Lucy Paniagua R.N.  Scrub Person: Angela Franklin  L&SONJA Circulator Assistant: Jaxon Chappell R.N.  L&SONJA Baby  Nurse: Carol Yañez R.N.    Specimens:  * No specimens in log *    Estimated Blood Loss: 800    Findings: viable female infant in jay jay breech presentation w/ apgars of 8/9, weight 3420gm, normal uterus/tubes/ovaries bilaterally    Complications: none        12/5/2017 10:19 AM Michelle Fischer

## 2017-12-06 LAB
ERYTHROCYTE [DISTWIDTH] IN BLOOD BY AUTOMATED COUNT: 42.2 FL (ref 35.9–50)
HCT VFR BLD AUTO: 28.3 % (ref 37–47)
HGB BLD-MCNC: 9.5 G/DL (ref 12–16)
MCH RBC QN AUTO: 32.1 PG (ref 27–33)
MCHC RBC AUTO-ENTMCNC: 33.6 G/DL (ref 33.6–35)
MCV RBC AUTO: 95.6 FL (ref 81.4–97.8)
PLATELET # BLD AUTO: 238 K/UL (ref 164–446)
PMV BLD AUTO: 10.1 FL (ref 9–12.9)
RBC # BLD AUTO: 2.96 M/UL (ref 4.2–5.4)
WBC # BLD AUTO: 14.5 K/UL (ref 4.8–10.8)

## 2017-12-06 PROCEDURE — 36415 COLL VENOUS BLD VENIPUNCTURE: CPT

## 2017-12-06 PROCEDURE — 700111 HCHG RX REV CODE 636 W/ 250 OVERRIDE (IP): Performed by: ANESTHESIOLOGY

## 2017-12-06 PROCEDURE — 700102 HCHG RX REV CODE 250 W/ 637 OVERRIDE(OP): Performed by: ANESTHESIOLOGY

## 2017-12-06 PROCEDURE — A9270 NON-COVERED ITEM OR SERVICE: HCPCS | Performed by: STUDENT IN AN ORGANIZED HEALTH CARE EDUCATION/TRAINING PROGRAM

## 2017-12-06 PROCEDURE — 700102 HCHG RX REV CODE 250 W/ 637 OVERRIDE(OP): Performed by: STUDENT IN AN ORGANIZED HEALTH CARE EDUCATION/TRAINING PROGRAM

## 2017-12-06 PROCEDURE — A9270 NON-COVERED ITEM OR SERVICE: HCPCS | Performed by: ANESTHESIOLOGY

## 2017-12-06 PROCEDURE — 770002 HCHG ROOM/CARE - OB PRIVATE (112)

## 2017-12-06 PROCEDURE — 85027 COMPLETE CBC AUTOMATED: CPT

## 2017-12-06 RX ORDER — DIPHENHYDRAMINE HYDROCHLORIDE 50 MG/ML
25 INJECTION INTRAMUSCULAR; INTRAVENOUS EVERY 6 HOURS PRN
Status: DISCONTINUED | OUTPATIENT
Start: 2017-12-06 | End: 2017-12-08 | Stop reason: HOSPADM

## 2017-12-06 RX ORDER — FERROUS SULFATE 325(65) MG
325 TABLET ORAL
Status: DISCONTINUED | OUTPATIENT
Start: 2017-12-06 | End: 2017-12-08 | Stop reason: HOSPADM

## 2017-12-06 RX ORDER — IBUPROFEN 600 MG/1
600 TABLET ORAL EVERY 6 HOURS PRN
Status: DISCONTINUED | OUTPATIENT
Start: 2017-12-06 | End: 2017-12-08 | Stop reason: HOSPADM

## 2017-12-06 RX ORDER — MORPHINE SULFATE 4 MG/ML
4 INJECTION, SOLUTION INTRAMUSCULAR; INTRAVENOUS
Status: DISCONTINUED | OUTPATIENT
Start: 2017-12-06 | End: 2017-12-08 | Stop reason: HOSPADM

## 2017-12-06 RX ORDER — ONDANSETRON 4 MG/1
4 TABLET, ORALLY DISINTEGRATING ORAL EVERY 6 HOURS PRN
Status: DISCONTINUED | OUTPATIENT
Start: 2017-12-06 | End: 2017-12-08 | Stop reason: HOSPADM

## 2017-12-06 RX ORDER — DIPHENHYDRAMINE HCL 25 MG
25 TABLET ORAL EVERY 6 HOURS PRN
Status: DISCONTINUED | OUTPATIENT
Start: 2017-12-06 | End: 2017-12-08 | Stop reason: HOSPADM

## 2017-12-06 RX ORDER — OXYCODONE HYDROCHLORIDE AND ACETAMINOPHEN 5; 325 MG/1; MG/1
1 TABLET ORAL EVERY 4 HOURS PRN
Status: DISCONTINUED | OUTPATIENT
Start: 2017-12-06 | End: 2017-12-08 | Stop reason: HOSPADM

## 2017-12-06 RX ORDER — ONDANSETRON 2 MG/ML
4 INJECTION INTRAMUSCULAR; INTRAVENOUS EVERY 6 HOURS PRN
Status: DISCONTINUED | OUTPATIENT
Start: 2017-12-06 | End: 2017-12-08 | Stop reason: HOSPADM

## 2017-12-06 RX ORDER — OXYCODONE AND ACETAMINOPHEN 10; 325 MG/1; MG/1
1 TABLET ORAL EVERY 4 HOURS PRN
Status: DISCONTINUED | OUTPATIENT
Start: 2017-12-06 | End: 2017-12-08 | Stop reason: HOSPADM

## 2017-12-06 RX ADMIN — Medication 325 MG: at 11:34

## 2017-12-06 RX ADMIN — OXYCODONE AND ACETAMINOPHEN 1 TABLET: 5; 325 TABLET ORAL at 02:35

## 2017-12-06 RX ADMIN — OXYCODONE AND ACETAMINOPHEN 1 TABLET: 5; 325 TABLET ORAL at 20:00

## 2017-12-06 RX ADMIN — Medication 325 MG: at 17:34

## 2017-12-06 RX ADMIN — IBUPROFEN 600 MG: 600 TABLET, FILM COATED ORAL at 11:34

## 2017-12-06 RX ADMIN — IBUPROFEN 600 MG: 600 TABLET, FILM COATED ORAL at 17:34

## 2017-12-06 RX ADMIN — OXYCODONE AND ACETAMINOPHEN 1 TABLET: 5; 325 TABLET ORAL at 16:02

## 2017-12-06 RX ADMIN — Medication 325 MG: at 08:19

## 2017-12-06 RX ADMIN — Medication 1 TABLET: at 08:19

## 2017-12-06 RX ADMIN — KETOROLAC TROMETHAMINE 30 MG: 30 INJECTION, SOLUTION INTRAMUSCULAR at 05:23

## 2017-12-06 ASSESSMENT — PAIN SCALES - GENERAL
PAINLEVEL_OUTOF10: 5
PAINLEVEL_OUTOF10: 5
PAINLEVEL_OUTOF10: 6
PAINLEVEL_OUTOF10: 4
PAINLEVEL_OUTOF10: 0
PAINLEVEL_OUTOF10: 0
PAINLEVEL_OUTOF10: 2
PAINLEVEL_OUTOF10: 5

## 2017-12-06 NOTE — PROGRESS NOTES
Assumed care of pt at 0700, pt denies need for pain medication at this time. Pts infant not waking up for breast feeding, RN assisting and pump set up in room at this time. Lactation will attempt to aide pt. Hourly rounding in place.

## 2017-12-06 NOTE — PROGRESS NOTES
1125- Patient arrived to Room S327.  Report received from ANJELICA Coates.    1140- Patient assessment done.  IV patent.  Discussed pain management with patient. Patient informed that the MD ordered IV toradol every 6 hours for approximately 24 hours from delivery.  Patient prefers to call for narcotic pain medication as needed.  Patient c/o slight itching.  Patient informed that medication is available for the itching and to call if she needs it.  Patient verbalized understanding.  Patient oriented to room, call system, infant security, [employer] [facility], and shown [employer] Maternal Orientation Video.  Reviewed plan of care.  Patient instructed on use of incentive spirometer.  Patient verbalized understanding.  Patient observed using it effectively. FOB at bedside.

## 2017-12-06 NOTE — CONSULTS
Lactation note:    Per RN request to see couplet. Initial visit. Discussed normal  behaviors at 12-24-48-72 hours, and what to expect. Discussed importance of offering breast every 2-3 hours, and even if infant shows no interest, can do hand expression into infant's lips. Encouraged to continue doing skin to skin. Discussed signs of a good latch, voiding and stooling patterns, feeding cues, stomach size, and importance of establishing milk supply with frequency of feedings.    Reviewed hand expression with MOB. MOB is comfortable doing hand expression.     Discussed feeding plan with MOB.  Plan to:  1. Offer to breastfeed first, if infant not latching well, or latch/feeding is suboptimal, then  2. Supplement with expressed breastmilk or formula per guidelines. Volumes reviewed based on infant's birth weight.  3. Pump to protect and stimulate supply.     Encouraged to call for additional lactation assistance.      Discussed plan with ANJELICA Wise.

## 2017-12-06 NOTE — PROGRESS NOTES
Received report from JAYDON Monroe RN. Assessment complete. Pt states pain at acceptable level. Discussed pain management plan with pt. Pt will call for PRN pain meds. Pt educated on the dangers of sleeping with infant. Call light within reach. Pt encouraged to call with needs or concerns.

## 2017-12-06 NOTE — CARE PLAN
Problem: Communication  Goal: The ability to communicate needs accurately and effectively will improve  Outcome: PROGRESSING AS EXPECTED  Reviewed plan of care with patient who verbalized understanding.    Problem: Altered physiologic condition related to postoperative  delivery  Goal: Patient physiologically stable as evidenced by normal lochia, palpable uterine involution and vital signs within normal limits  Outcome: PROGRESSING AS EXPECTED  Fundus firm.  Lochia scant.  VSS.    Problem: Potential for postpartum infection related to surgical incision, compromised uterine condition, urinary tract or respiratory compromise  Goal: Patient will be afebrile and free from signs and symptoms of infection  Outcome: PROGRESSING AS EXPECTED  Patient is afebrile.

## 2017-12-06 NOTE — CARE PLAN
Problem: Pain Management  Goal: Pain level will decrease to patient's comfort goal  Outcome: PROGRESSING AS EXPECTED  Pain well controlled at this time, RN performing q4 pain assessments.

## 2017-12-06 NOTE — CARE PLAN
Problem: Knowledge Deficit  Goal: Knowledge of disease process/condition, treatment plan, diagnostic tests, and medications will improve  Outcome: PROGRESSING AS EXPECTED  RN discussed POC with pt, all questions and concerns addressed at this time. New routine in place for feedings. Pt attempting tp latch infant, if no latch pt performing skin to skin and then pumping. Infant supplementing with donor breast milk.

## 2017-12-06 NOTE — PROGRESS NOTES
Name:   Sierra Villegas   Date/Time:  2017 6:30 AM  Gestational Age:  39w3d  Admit Date:   2017  Admitting Dx:   Pregnancy  BREECH PRESENTATION, 39+2 WEEKS GESTATION  Labor and delivery indication for care or intervention    POD# 1 S/P LSC    S:  Abdominal pain yes and no   Ambulating   yes  Tolerating PO  yes  Flatus    yes  Bleeding   no  Voiding   yes   Dizziness   no  Breast feeding  yes  Breast tenderness  no    O:  Pulse: 72  Blood Pressure: (!) 99/62     Temp  Av.3 °C (97.4 °F)  Min: 36.1 °C (97 °F)  Max: 36.6 °C (97.8 °F)  Heart: regular rate and rhythm without gallops or murmurs  Lungs: clear bases  Abdomen: flat and soft/nontender / bowelsounds present / incision clean and dry.  Extremities: non-tender  Catheter: DC'd    Intake/Output Summary (Last 24 hours) at 17 0630  Last data filed at 17 0100   Gross per 24 hour   Intake             4800 ml   Output             3550 ml   Net             1250 ml       A:  POD# 1 S/P low transverse c/s  Stable/progressing well    P:  Routine C/S Postpartum care, continue pain management, encourage ambulation, anticipate DC POD#3    Talita Ambrosio M.D.

## 2017-12-06 NOTE — CARE PLAN
Problem: Altered physiologic condition related to postoperative  delivery  Goal: Patient physiologically stable as evidenced by normal lochia, palpable uterine involution and vital signs within normal limits  Outcome: PROGRESSING AS EXPECTED  Vital signs stable. Fundus firm, lochia light      Problem: Alteration in comfort related to surgical incision and/or after birth pains  Goal: Patient verbalizes acceptable pain level  Outcome: PROGRESSING AS EXPECTED  Discussed 0-10 pain scale and available prn pain medications with pt. Pt states pain at acceptable level at this time

## 2017-12-07 PROCEDURE — A9270 NON-COVERED ITEM OR SERVICE: HCPCS | Performed by: STUDENT IN AN ORGANIZED HEALTH CARE EDUCATION/TRAINING PROGRAM

## 2017-12-07 PROCEDURE — 700102 HCHG RX REV CODE 250 W/ 637 OVERRIDE(OP): Performed by: STUDENT IN AN ORGANIZED HEALTH CARE EDUCATION/TRAINING PROGRAM

## 2017-12-07 PROCEDURE — 770002 HCHG ROOM/CARE - OB PRIVATE (112)

## 2017-12-07 RX ADMIN — Medication 325 MG: at 11:28

## 2017-12-07 RX ADMIN — Medication 325 MG: at 17:22

## 2017-12-07 RX ADMIN — IBUPROFEN 600 MG: 600 TABLET, FILM COATED ORAL at 12:34

## 2017-12-07 RX ADMIN — OXYCODONE AND ACETAMINOPHEN 1 TABLET: 5; 325 TABLET ORAL at 00:01

## 2017-12-07 RX ADMIN — IBUPROFEN 600 MG: 600 TABLET, FILM COATED ORAL at 00:01

## 2017-12-07 RX ADMIN — OXYCODONE AND ACETAMINOPHEN 1 TABLET: 5; 325 TABLET ORAL at 04:12

## 2017-12-07 RX ADMIN — Medication 325 MG: at 08:03

## 2017-12-07 RX ADMIN — Medication 1 TABLET: at 08:02

## 2017-12-07 ASSESSMENT — PAIN SCALES - GENERAL
PAINLEVEL_OUTOF10: 3
PAINLEVEL_OUTOF10: 2
PAINLEVEL_OUTOF10: 5
PAINLEVEL_OUTOF10: 0
PAINLEVEL_OUTOF10: 2
PAINLEVEL_OUTOF10: 2
PAINLEVEL_OUTOF10: 4

## 2017-12-07 NOTE — PROGRESS NOTES
Assumed care of pt at 0700, pt denies need for pain medication at this time. Steristrips to abd, ABEL and CDI. Discussed breast feeding with pt, all questions addressed at this time. Hourly rounding in place.

## 2017-12-07 NOTE — PROGRESS NOTES
Mom states baby has improved with latch and suckling. Mom successfully breast fed her first two infants. Mom has no questions at this time. Encouraged to call for assistance as needed.

## 2017-12-07 NOTE — CARE PLAN
Problem: Alteration in comfort related to surgical incision and/or after birth pains  Goal: Patient is able to ambulate, care for self and infant with acceptable pain level  Outcome: PROGRESSING AS EXPECTED  Pt verbalizes acceptable pain level, pt ambulating and caring for self and infant. Encouraged to ask for pain meds if needed.    Problem: Potential knowledge deficit related to lack of understanding of self and  care  Goal: Patient will demonstrate ability to care for self and infant  Outcome: PROGRESSING AS EXPECTED  Pt demonstrates appropriate care for self and infant. Encouraged to call for assistance or with questions and concerns.

## 2017-12-07 NOTE — PROGRESS NOTES
RN requested assistance with latch. Repositioned baby at breast using cross cradle hold, skin to skin, hand expressed colostrum to encourage latch. Observed deep latch with few sucks, baby continues to be sleepy at breast, encouraged mother to stimulate baby while at breast.

## 2017-12-07 NOTE — PROGRESS NOTES
Post Partum Progress Note    Name:   Sierra Villegas   Date/Time:  2017 - 6:35 AM  Chief Admitting Dx:  Pregnancy  BREECH PRESENTATION, 39+2 WEEKS GESTATION  Labor and delivery indication for care or intervention  Delivery Type:   for breech  Post-Op/Post Partum Days #:  2    Subjective:  Abdominal pain: no  Ambulating:   yes  Tolerating liquids:  yes  Tolerating food:  yes common adult  Flatus:   yes  BM:    no  Bleeding:   without any bleeding  Voiding:   yes  Dizziness:   no  Feeding:   breast    Vitals:    17 0200 17 0400 17 0800 17   BP:  101/66 (!) 97/60 106/77   Pulse: 72 71 66 65   Resp: 17  16   Temp:  35.9 °C (96.6 °F) 36.6 °C (97.9 °F) 36.4 °C (97.6 °F)   SpO2: 94% 94% 96% 96%   Weight:       Height:           Exam:  Breast: Tenderness no, Engorged no and Lactating yes  Abdomen: Abdomen soft, non-tender. BS normal. No masses,  No organomegaly  Fundal Tenderness:  no  Fundus Firm: yes  Incision: dry and intact  Below umbilicus: yes  Perineum: perineum intact  Lochia: mild  Extremities: Normal extremities, peripheral pulses and reflexes normal    Meds:  Current Facility-Administered Medications   Medication Dose   • ibuprofen (MOTRIN) tablet 600 mg  600 mg   • oxycodone-acetaminophen (PERCOCET) 5-325 MG per tablet 1 Tab  1 Tab   • oxycodone-acetaminophen (PERCOCET-10)  MG per tablet 1 Tab  1 Tab   • morphine (pf) 4 mg/ml injection 4 mg  4 mg   • ondansetron (ZOFRAN) syringe/vial injection 4 mg  4 mg    Or   • ondansetron (ZOFRAN ODT) dispertab 4 mg  4 mg   • diphenhydrAMINE (BENADRYL) tablet/capsule 25 mg  25 mg    Or   • diphenhydrAMINE (BENADRYL) injection 25 mg  25 mg   • ferrous sulfate tablet 325 mg  325 mg   • LR infusion     • docusate sodium (COLACE) capsule 100 mg  100 mg   • bisacodyl (DULCOLAX) suppository 10 mg  10 mg   • magnesium hydroxide (MILK OF MAGNESIA) suspension 30 mL  30 mL   • glycerin (adult) suppository 1 Suppository  1  Suppository   • prenatal plus vitamin (STUARTNATAL 1+1) 27-1 MG tablet 1 Tab  1 Tab       Labs:   Recent Labs      17   0805  17   0430   WBC  13.3*  14.5*   RBC  4.00*  2.96*   HEMOGLOBIN  12.8  9.5*   HEMATOCRIT  37.9  28.3*   MCV  94.8  95.6   MCH  32.0  32.1   MCHC  33.8  33.6   RDW  42.9  42.2   PLATELETCT  277  238   MPV  9.9  10.1       Assessment:  Chief Admitting Dx:  Pregnancy  BREECH PRESENTATION, 39+2 WEEKS GESTATION  Labor and delivery indication for care or intervention  Delivery Type:   for breech  Tubal Ligation:  no    Plan:  Continue routine post partum care.  Continue breast, bonding and ambulation. Anticipate DC tomorrow.     Cici Rojas D.N.P.

## 2017-12-07 NOTE — CARE PLAN
Problem: Knowledge Deficit  Goal: Knowledge of disease process/condition, treatment plan, diagnostic tests, and medications will improve  Outcome: PROGRESSING AS EXPECTED  Discussed breast feeding scheduling and plan with pt, plan in place. Pt calling for assistance when needed.     Problem: Pain Management  Goal: Pain level will decrease to patient's comfort goal  Outcome: PROGRESSING AS EXPECTED  PRN pain medications being used as needed, pt denying pain at this time, RN will continue to reassess pts pain.

## 2017-12-08 VITALS
OXYGEN SATURATION: 97 % | BODY MASS INDEX: 31.23 KG/M2 | WEIGHT: 199 LBS | SYSTOLIC BLOOD PRESSURE: 117 MMHG | TEMPERATURE: 97 F | RESPIRATION RATE: 18 BRPM | HEART RATE: 62 BPM | DIASTOLIC BLOOD PRESSURE: 79 MMHG | HEIGHT: 67 IN

## 2017-12-08 PROCEDURE — A9270 NON-COVERED ITEM OR SERVICE: HCPCS | Performed by: STUDENT IN AN ORGANIZED HEALTH CARE EDUCATION/TRAINING PROGRAM

## 2017-12-08 PROCEDURE — 700102 HCHG RX REV CODE 250 W/ 637 OVERRIDE(OP): Performed by: STUDENT IN AN ORGANIZED HEALTH CARE EDUCATION/TRAINING PROGRAM

## 2017-12-08 RX ORDER — PSEUDOEPHEDRINE HCL 30 MG
100 TABLET ORAL 2 TIMES DAILY PRN
Qty: 60 CAP | Refills: 2 | Status: SHIPPED | OUTPATIENT
Start: 2017-12-08 | End: 2019-08-12

## 2017-12-08 RX ORDER — OXYCODONE HYDROCHLORIDE AND ACETAMINOPHEN 5; 325 MG/1; MG/1
1 TABLET ORAL EVERY 4 HOURS PRN
Qty: 30 TAB | Refills: 0 | Status: SHIPPED | OUTPATIENT
Start: 2017-12-08 | End: 2019-08-12

## 2017-12-08 RX ORDER — FERROUS SULFATE 325(65) MG
325 TABLET ORAL
Qty: 30 TAB | Refills: 2 | Status: SHIPPED | OUTPATIENT
Start: 2017-12-08 | End: 2019-08-12

## 2017-12-08 RX ORDER — IBUPROFEN 600 MG/1
600 TABLET ORAL EVERY 6 HOURS PRN
Qty: 30 TAB | Refills: 2 | Status: SHIPPED | OUTPATIENT
Start: 2017-12-08 | End: 2019-08-12

## 2017-12-08 RX ADMIN — Medication 1 TABLET: at 07:35

## 2017-12-08 RX ADMIN — Medication 325 MG: at 11:43

## 2017-12-08 RX ADMIN — Medication 325 MG: at 07:35

## 2017-12-08 RX ADMIN — IBUPROFEN 600 MG: 600 TABLET, FILM COATED ORAL at 01:36

## 2017-12-08 ASSESSMENT — PAIN SCALES - GENERAL
PAINLEVEL_OUTOF10: 3
PAINLEVEL_OUTOF10: 1
PAINLEVEL_OUTOF10: 0
PAINLEVEL_OUTOF10: 2
PAINLEVEL_OUTOF10: 1

## 2017-12-08 NOTE — PROGRESS NOTES
Bedside report done, patient in bed with FOB @ bedside. Denies pain @ this time. Will continue to monitor.

## 2017-12-08 NOTE — PROGRESS NOTES
Pt in stable condition - VSS. Assessment done, bleeding wnl, fundus firm.  Plan of care discussed. Call light placed within reach. Pt educated regarding safe sleeping for infant and the importance of putting baby in the crib, on back, when mother would like to nap. All questions answered. Pain management plan discussed, pt would like her pain meds PRN.

## 2017-12-08 NOTE — DISCHARGE INSTRUCTIONS
POSTPARTUM DISCHARGE INSTRUCTIONS FOR MOM    YOB: 1990   Age: 27 y.o.               Admit Date: 2017     Discharge Date: 2017  Attending Doctor:  Vandana Nelson*                  Allergies:  Patient has no known allergies.    Discharged to home by car. Discharged via wheelchair, hospital escort: Yes.  Special equipment needed: Not Applicable  Belongings with: Personal  Be sure to schedule a follow-up appointment with your primary care doctor or any specialists as instructed.     Discharge Plan:   Diet Plan: Discussed  Activity Level: Discussed  Confirmed Follow up Appointment: No (Comments)  Confirmed Symptoms Management: Discussed  Medication Reconciliation Updated: No (Comments)  Influenza Vaccine Indication: Not indicated: Previously immunized this influenza season and > 8 years of age    REASONS TO CALL YOUR OBSTETRICIAN:  1.   Persistent fever or shaking chills (Temperature higher than 100.4)  2.   Heavy bleeding (soaking more than 1 pad per hour); Passing clots  3.   Foul odor from vagina  4.   Mastitis (Breast infection; breast pain, chills, fever, redness)  5.   Urinary pain, burning or frequency  6.   Episiotomy infection  7.   Abdominal incision infection  8.   Severe depression longer than 24 hours    HAND WASHING  · Prior to handling the baby.  · Before breastfeeding or bottle feeding baby.  · After using the bathroom or changing the baby's diaper.    WOUND CARE  Ask your physician for additional care instructions.  In general:    ·  Incision:      · Keep clean and dry.    · Do NOT lift anything heavier than your baby for up to 6 weeks.    · There should not be any opening or pus.      VAGINAL CARE  · Nothing inside vagina for 6 weeks: no sexual intercourse, tampons or douching.  · Bleeding may continue for 2-4 weeks.  Amount may vary.    · Call your physician for heavy bleeding which means soaking more than 1 pad per hour    BIRTH CONTROL  · It is possible to  "become pregnant at any time after delivery and while breastfeeding.  · Plan to discuss a method of birth control with your physician at your follow up visit. visit.    DIET AND ELIMINATION  · Eating more fiber (bran cereal, fruits, and vegetables) and drinking plenty of fluids will help to avoid constipation.  · Urinary frequency after childbirth is normal.    POSTPARTUM BLUES  During the first few days after birth, you may experience a sense of the \"blues\" which may include impatience, irritability or even crying.  These feeling come and go quickly.  However, as many as 1 in 10 women experience emotional symptoms known as postpartum depression.    Postpartum depression:  May start as early as the second or third day after delivery or take several weeks or months to develop.  Symptoms of \"blues\" are present, but are more intense:  Crying spells; loss of appetite; feelings of hopelessness or loss of control; fear of touching the baby; over concern or no concern at all about the baby; little or no concern about your own appearance/caring for yourself; and/or inability to sleep or excessive sleeping.  Contact your physician if you are experiencing any of these symptoms.    Crisis Hotline:  · [place] Crisis Hotline:  2-138-JAUESQS  Or 1-108.488.2837  · [place] Crisis Hotline:  1-131.519.5050  Or 918-453-9020    PREVENTING SHAKEN BABY:  If you are angry or stressed, PUT THE BABY IN THE CRIB, step away, take some deep breaths, and wait until you are calm to care for the baby.  DO NOT SHAKE THE BABY.  You are not alone, call a supporter for help.    · Crisis [facility] Center 24/7 crisis line 640-610-2093 or 1-535.470.8986  · You can also text them, text \"ANSWER\" to 234811    QUIT SMOKING/TOBACCO USE:  I understand the use of any tobacco products increases my chance of suffering from future heart disease and could cause other illnesses which may shorten my life. Quitting the use of tobacco products is the single most important " thing I can do to improve my health. For further information on smoking / tobacco cessation call a Toll Free Quit Line at 1-344.499.4501 (*[employer] [place]) or 1-909.201.8930 (American Lung Association) or you can access the web based program at www.lungusa.org.    · [place] Tobacco Users Help Line:  (661) 807-7358       Toll Free: 1-298.990.9488  · Quit Tobacco Program [facility] (090)976-5605    DEPRESSION / SUICIDE RISK:  As you are discharged from this [facility], it is important to learn how to keep safe from harming yourself.    Recognize the warning signs:  · Abrupt changes in personality, positive or negative- including increase in energy   · Giving away possessions  · Change in eating patterns- significant weight changes-  positive or negative  · Change in sleeping patterns- unable to sleep or sleeping all the time   · Unwillingness or inability to communicate  · Depression  · Unusual sadness, discouragement and loneliness  · Talk of wanting to die  · Neglect of personal appearance   · Rebelliousness- reckless behavior  · Withdrawal from people/activities they love  · Confusion- inability to concentrate     If you or a loved one observes any of these behaviors or has concerns about self-harm, here's what you can do:  · Talk about it- your feelings and reasons for harming yourself  · Remove any means that you might use to hurt yourself (examples: pills, rope, extension cords, firearm)  · Get professional help from the community (Mental Health, Substance Abuse, psychological counseling)  · Do not be alone:Call your Safe Contact- someone whom you trust who will be there for you.  · Call your local CRISIS HOTLINE 057-2856 or 404-426-0001  · Call your local Children's Mobile Crisis Response Team [place] (685) 948-7304 or www."[employer]  · Call the toll free National Suicide Prevention Hotlines   · National Suicide Prevention Lifeline 724-088-XTGA  (7430)  · [facility] 800-SUICIDE (766-2041)    DISCHARGE SURVEY:  Thank you for choosing [facility].  We hope we provided you with very good care.  You may be receiving a survey in the mail.  Please fill it out.  Your opinion is valuable to us.    ADDITIONAL EDUCATIONAL MATERIALS GIVEN TO PATIENT:        My signature on this form indicates that:  1.  I have reviewed and understand the above information  2.  My questions regarding this information have been answered to my satisfaction.  3.  I have formulated a plan with my discharge nurse to obtain my prescribed medication for home.

## 2017-12-08 NOTE — DISCHARGE SUMMARY
Discharge Summary:      Sierra Villegas      Admit Date:   2017  Discharge Date:  2017     Admitting diagnosis:  Pregnancy  BREECH PRESENTATION, 39+2 WEEKS GESTATION  Labor and delivery indication for care or intervention  Discharge Diagnosis: Status post  for breech.  Pregnancy Complications: none  Tubal Ligation:  no        History:  History reviewed. No pertinent past medical history.  OB History    Para Term  AB Living   3 2 2     2   SAB TAB Ectopic Molar Multiple Live Births             2      # Outcome Date GA Lbr Trevor/2nd Weight Sex Delivery Anes PTL Lv   3 Current            2 Term 11 39w0d  3.6 kg (7 lb 15 oz) F Vag-Spont EPI N JESSE      Birth Comments: Pt states no complications.    1 Term 10/18/08 39w0d  3.657 kg (8 lb 1 oz) M Vag-Spont EPI N JESSE      Birth Comments: Pt states no complications.            Patient has no known allergies.  Patient Active Problem List    Diagnosis Date Noted   • Breech presentation 2017   • Encounter for supervision of normal first pregnancy in third trimester 10/18/2017        Hospital Course:   27 y.o. , now para 3, was admitted with the above mentioned diagnosis, underwent Primary  breech,  for breech. Patient postpartum course was unremarkable, with progressive advancement in diet , ambulation and toleration of oral analgesia. Patient without complaints today and desires discharge.      Vitals:    17 0800 17 0800 17   BP: (!) 97/60 106/77 125/85 112/77   Pulse: 66 65 68 65   Resp: 18 16 18 18   Temp: 36.6 °C (97.9 °F) 36.4 °C (97.6 °F) 36.1 °C (97 °F) 36.9 °C (98.4 °F)   SpO2: 96% 96% 95% 94%   Weight:       Height:           Current Facility-Administered Medications   Medication Dose   • ibuprofen (MOTRIN) tablet 600 mg  600 mg   • oxycodone-acetaminophen (PERCOCET) 5-325 MG per tablet 1 Tab  1 Tab   • oxycodone-acetaminophen (PERCOCET-10)  MG per  tablet 1 Tab  1 Tab   • morphine (pf) 4 mg/ml injection 4 mg  4 mg   • ondansetron (ZOFRAN) syringe/vial injection 4 mg  4 mg    Or   • ondansetron (ZOFRAN ODT) dispertab 4 mg  4 mg   • diphenhydrAMINE (BENADRYL) tablet/capsule 25 mg  25 mg    Or   • diphenhydrAMINE (BENADRYL) injection 25 mg  25 mg   • ferrous sulfate tablet 325 mg  325 mg   • LR infusion     • docusate sodium (COLACE) capsule 100 mg  100 mg   • bisacodyl (DULCOLAX) suppository 10 mg  10 mg   • magnesium hydroxide (MILK OF MAGNESIA) suspension 30 mL  30 mL   • glycerin (adult) suppository 1 Suppository  1 Suppository   • prenatal plus vitamin (STUARTNATAL 1+1) 27-1 MG tablet 1 Tab  1 Tab       Exam:  Breast Exam: negative  Abdomen: Abdomen soft, non-tender. BS normal. No masses,  No organomegaly  Fundus Non Tender: yes  Incision: dry and intact  Perineum: perineum intact  Extremity: extremities, peripheral pulses and reflexes normal, no edema, redness or tenderness in the calves or thighs     Labs:  Recent Labs      12/05/17   0805  12/06/17   0430   WBC  13.3*  14.5*   RBC  4.00*  2.96*   HEMOGLOBIN  12.8  9.5*   HEMATOCRIT  37.9  28.3*   MCV  94.8  95.6   MCH  32.0  32.1   MCHC  33.8  33.6   RDW  42.9  42.2   PLATELETCT  277  238   MPV  9.9  10.1        Activity:   Discharge to home  Pelvic Rest x 6 weeks    Assessment:  normal postpartum course  Discharge Assessment: No areas of skin breakdown/redness; surgical incision intact/healing     Follow up: [facility] or [facility] [place] [facility] in 5 weeks for vaginal ; 1 week for incision check.      Discharge Meds:   Current Outpatient Prescriptions   Medication Sig Dispense Refill   • oxycodone-acetaminophen (PERCOCET) 5-325 MG Tab Take 1 Tab by mouth every four hours as needed (for Moderate Pain (Pain Scale 4-6) after delivery). 30 Tab 0   • ibuprofen (MOTRIN) 600 MG Tab Take 1 Tab by mouth every 6 hours as needed (For cramping after delivery; do not give if patient is receiving ketorolac (Toradol)). 30 Tab  2   • ferrous sulfate 325 (65 Fe) MG tablet Take 1 Tab by mouth 3 times a day, with meals. 30 Tab 2   • docusate sodium 100 MG Cap Take 100 mg by mouth 2 times a day as needed for Constipation. 60 Cap 2       Denae Jones C.N.M.

## 2017-12-08 NOTE — CARE PLAN
Problem: Altered physiologic condition related to postoperative  delivery  Goal: Patient physiologically stable as evidenced by normal lochia, palpable uterine involution and vital signs within normal limits  Outcome: PROGRESSING AS EXPECTED  Lochia wnl and fundus firm.     Problem: Potential knowledge deficit related to lack of understanding of self and  care  Goal: Patient will verbalize understanding of self and infant care  Outcome: PROGRESSING AS EXPECTED  Pt verbalizing understanding of self and infant - will continue to educate.

## 2017-12-08 NOTE — PROGRESS NOTES
Kavya Loja R.N. Lactation Consultant Signed   Progress Notes Date of Service: 2017 11:45 AM         []Hide copied text  []Jarredver for attribution information  Met with mother just before her discharge (Day 3). This is her 3rd baby. Baby was born at 39.2 weeks gestation via  for breech. Baby's weight is down 9% so baby has been supplemented. Gave mother a goldenrod guide for supplementation amounts to help her at home until baby is gaining weight. She was also given info about pump rental. She will probably rent a pump from the Lactation connection for home. Baby reportedly is nursing well but did not get to see a feeding. Gave her a BF booklet with OP lactation resources and encouraged her to use them as needed.

## 2017-12-08 NOTE — CARE PLAN
Problem: Altered physiologic condition related to postoperative  delivery  Goal: Patient physiologically stable as evidenced by normal lochia, palpable uterine involution and vital signs within normal limits  Outcome: PROGRESSING AS EXPECTED  Fundus firm @ U,  Lochia rubra minimal. Surgical incision CDI. V/S within defined limits.    Problem: Potential for postpartum infection related to surgical incision, compromised uterine condition, urinary tract or respiratory compromise  Goal: Patient will be afebrile and free from signs and symptoms of infection  Outcome: PROGRESSING AS EXPECTED  Patient has no S/S of infection noted  this time. Afebrile

## 2017-12-08 NOTE — PROGRESS NOTES
Discharge instructions reviewed with parents and signed by MOB. Follow up appointments and prescriptions reviewed with MOB. Unit sleep sack taken and personal sleep sack given. Cord clamp and Cuddles transponder removed. All questions addressed at this time. Instructed to press call light when infant strapped in car seat for car seat check. Parents verbalized understanding.

## 2017-12-15 ENCOUNTER — POST PARTUM (OUTPATIENT)
Dept: OBGYN | Facility: CLINIC | Age: 27
End: 2017-12-15
Payer: MEDICAID

## 2017-12-15 VITALS — WEIGHT: 184 LBS | DIASTOLIC BLOOD PRESSURE: 70 MMHG | SYSTOLIC BLOOD PRESSURE: 120 MMHG | BODY MASS INDEX: 28.82 KG/M2

## 2017-12-15 DIAGNOSIS — Z09 POSTOP CHECK: ICD-10-CM

## 2017-12-15 PROCEDURE — 90050 PR POSTPARTUM VISIT: CPT | Performed by: OBSTETRICS & GYNECOLOGY

## 2017-12-15 NOTE — PROGRESS NOTES
SUBJECTIVE:  Sierra Villegas presents to the clinic 1.5 weeks following C/S    Eating a regular diet without difficulty. Bowel movement are Normal.  Pain is controlled with current analgesics.  Medication(s) being used: percocet and ibuprofen. Spotting. Patient Denies Incisional pain, drainage or redness    OBJECTIVE:  /70   Wt 83.5 kg (184 lb)   LMP 03/05/2017   BMI 28.82 kg/m²   Current Outpatient Prescriptions on File Prior to Visit   Medication Sig Dispense Refill   • ibuprofen (MOTRIN) 600 MG Tab Take 1 Tab by mouth every 6 hours as needed (For cramping after delivery; do not give if patient is receiving ketorolac (Toradol)). 30 Tab 2   • ferrous sulfate 325 (65 Fe) MG tablet Take 1 Tab by mouth 3 times a day, with meals. 30 Tab 2   • docusate sodium 100 MG Cap Take 100 mg by mouth 2 times a day as needed for Constipation. 60 Cap 2   • Prenatal MV-Min-Fe Fum-FA-DHA (PRENATAL 1 PO) Take  by mouth.     • oxycodone-acetaminophen (PERCOCET) 5-325 MG Tab Take 1 Tab by mouth every four hours as needed (for Moderate Pain (Pain Scale 4-6) after delivery). 30 Tab 0     No current facility-administered medications on file prior to visit.        Constitutional:  alert, no distress.  Abdomen:  soft, bowel sounds active, non-tender.  Incision:  healing well, no drainage, no erythema, no hernia, no seroma, no swelling, no dehiscence, incision well approximated.    IMPRESSION: Doing well postoperatively.  Pt is to increase activities as tolerated.    Lab:   Recent Results (from the past 1008 hour(s))   GRP B STREP, BY PCR (SY BROTH)    Collection Time: 11/16/17  1:35 PM   Result Value Ref Range    Strep Gp B DNA PCR Negative Negative   URINALYSIS    Collection Time: 12/05/17  7:45 AM   Result Value Ref Range    Color Yellow     Character Cloudy (A)     Specific Gravity 1.020 <1.035    Ph 8.0 5.0 - 8.0    Glucose Negative Negative mg/dL    Ketones Negative Negative mg/dL    Protein Negative Negative mg/dL     Bilirubin Negative Negative    Urobilinogen, Urine 1.0 Negative    Nitrite Negative Negative    Leukocyte Esterase Small (A) Negative    Occult Blood Negative Negative    Micro Urine Req Microscopic    URINE MICROSCOPIC (W/UA)    Collection Time: 12/05/17  7:45 AM   Result Value Ref Range    WBC 5-10 (A) /hpf    RBC 2-5 (A) /hpf    Bacteria Moderate (A) None /hpf    Epithelial Cells Many (A) /hpf    Trans Epithelial Cells Rare /hpf    Hyaline Cast 6-10 (A) /lpf   CBC WITH DIFFERENTIAL    Collection Time: 12/05/17  8:05 AM   Result Value Ref Range    WBC 13.3 (H) 4.8 - 10.8 K/uL    RBC 4.00 (L) 4.20 - 5.40 M/uL    Hemoglobin 12.8 12.0 - 16.0 g/dL    Hematocrit 37.9 37.0 - 47.0 %    MCV 94.8 81.4 - 97.8 fL    MCH 32.0 27.0 - 33.0 pg    MCHC 33.8 33.6 - 35.0 g/dL    RDW 42.9 35.9 - 50.0 fL    Platelet Count 277 164 - 446 K/uL    MPV 9.9 9.0 - 12.9 fL    Neutrophils-Polys 73.20 (H) 44.00 - 72.00 %    Lymphocytes 16.70 (L) 22.00 - 41.00 %    Monocytes 6.90 0.00 - 13.40 %    Eosinophils 1.30 0.00 - 6.90 %    Basophils 0.50 0.00 - 1.80 %    Immature Granulocytes 1.40 (H) 0.00 - 0.90 %    Nucleated RBC 0.00 /100 WBC    Neutrophils (Absolute) 9.71 (H) 2.00 - 7.15 K/uL    Lymphs (Absolute) 2.21 1.00 - 4.80 K/uL    Monos (Absolute) 0.91 (H) 0.00 - 0.85 K/uL    Eos (Absolute) 0.17 0.00 - 0.51 K/uL    Baso (Absolute) 0.06 0.00 - 0.12 K/uL    Immature Granulocytes (abs) 0.19 (H) 0.00 - 0.11 K/uL    NRBC (Absolute) 0.00 K/uL   Hold Blood Bank Specimen (Not Tested)    Collection Time: 12/05/17  8:05 AM   Result Value Ref Range    Holding Tube - Bb DONE    CBC without differential    Collection Time: 12/06/17  4:30 AM   Result Value Ref Range    WBC 14.5 (H) 4.8 - 10.8 K/uL    RBC 2.96 (L) 4.20 - 5.40 M/uL    Hemoglobin 9.5 (L) 12.0 - 16.0 g/dL    Hematocrit 28.3 (L) 37.0 - 47.0 %    MCV 95.6 81.4 - 97.8 fL    MCH 32.1 27.0 - 33.0 pg    MCHC 33.6 33.6 - 35.0 g/dL    RDW 42.2 35.9 - 50.0 fL    Platelet Count 238 164 - 446 K/uL    MPV  10.1 9.0 - 12.9 fL       PLAN:  Continue any current medications.  (See Med List for details.)  Return to clinic  : in 4 week(s).

## 2018-01-08 ENCOUNTER — POST PARTUM (OUTPATIENT)
Dept: OBGYN | Facility: CLINIC | Age: 28
End: 2018-01-08
Payer: MEDICAID

## 2018-01-08 VITALS — WEIGHT: 181 LBS | SYSTOLIC BLOOD PRESSURE: 108 MMHG | DIASTOLIC BLOOD PRESSURE: 68 MMHG | BODY MASS INDEX: 28.35 KG/M2

## 2018-01-08 PROBLEM — Z34.03 ENCOUNTER FOR SUPERVISION OF NORMAL FIRST PREGNANCY IN THIRD TRIMESTER: Status: RESOLVED | Noted: 2017-10-18 | Resolved: 2018-01-08

## 2018-01-08 PROCEDURE — 90050 PR POSTPARTUM VISIT: CPT | Performed by: PHYSICIAN ASSISTANT

## 2018-01-08 NOTE — PROGRESS NOTES
Pt here today for postpartum exam pt delivered on 12/5/17 primary C/S  Currently breast feeding.   BCM: Pt would like Nexplanon   Good ph: [phone]  Pt states no complaints.

## 2018-01-08 NOTE — PROGRESS NOTES
Subjective:      Sierra Villegas is a 27 y.o. female who presents with postpartum visit today. 5wk s/p primary C/S for breech without complications. Pt has no complaints- denies heavy vaginal bleeding, depression, intercourse, pain or problems with BF. PAP wnl  - repeat . BCM desired is Nexplanon, so pt to make appt. Pt to use condoms in meantime.            HPI    ROS       Objective:     /68   Wt 82.1 kg (181 lb)   LMP 2017   BMI 28.35 kg/m²      Physical Exam   Constitutional: She appears well-developed and well-nourished.   HENT:   Head: Normocephalic and atraumatic.   Eyes: Pupils are equal, round, and reactive to light.   Neck: Normal range of motion. Neck supple. No thyromegaly present.   Cardiovascular: Normal rate, regular rhythm and normal heart sounds.    Pulmonary/Chest: Effort normal and breath sounds normal. No respiratory distress.   Abdominal: Soft. Bowel sounds are normal. She exhibits no distension. There is no tenderness.   C/S incision C/D/I without erythema or induration   Genitourinary: Vagina normal and uterus normal. Pelvic exam was performed with patient supine. There is no rash or tenderness on the right labia. There is no rash or tenderness on the left labia. Uterus is not deviated, not enlarged and not tender. Cervix exhibits no motion tenderness. Right adnexum displays no mass and no tenderness. Left adnexum displays no mass and no tenderness. No erythema in the vagina. No foreign body in the vagina. No signs of injury around the vagina. No vaginal discharge found.   Neurological: She is alert. She has normal reflexes.   Skin: Skin is warm and dry. No erythema.   Psychiatric: She has a normal mood and affect. Her behavior is normal. Thought content normal.   Vitals reviewed.              Assessment/Plan:     1. Postpartum care following  delivery  - PAP wnl  - repeat   - F/u Gyn Clinic for Nexplanon, condoms in meantime

## 2018-02-06 ENCOUNTER — GYNECOLOGY VISIT (OUTPATIENT)
Dept: OBGYN | Facility: CLINIC | Age: 28
End: 2018-02-06
Payer: MEDICAID

## 2018-02-06 DIAGNOSIS — Z30.09 FAMILY PLANNING: ICD-10-CM

## 2018-02-06 PROCEDURE — 99213 OFFICE O/P EST LOW 20 MIN: CPT | Performed by: OBSTETRICS & GYNECOLOGY

## 2018-02-06 NOTE — PROGRESS NOTES
Chief complaint;    Sierra Villegas is a 27 y.o.  who presents for family planning discussion.    Review of systems; denies fever chills abdominal pain, denies chest pain shortness of breath or urinary symptoms  Past medical history-History reviewed. No pertinent past medical history.  Past surgical history-  Past Surgical History:   Procedure Laterality Date   • PRIMARY C SECTION N/A 2017    Procedure: PRIMARY C SECTION;  Surgeon: Vandana Cueto M.D.;  Location: LABOR AND DELIVERY;  Service: Labor and Delivery     Allergies-Patient has no known allergies.  Medications-  Current Outpatient Prescriptions on File Prior to Visit   Medication Sig Dispense Refill   • oxycodone-acetaminophen (PERCOCET) 5-325 MG Tab Take 1 Tab by mouth every four hours as needed (for Moderate Pain (Pain Scale 4-6) after delivery). 30 Tab 0   • ibuprofen (MOTRIN) 600 MG Tab Take 1 Tab by mouth every 6 hours as needed (For cramping after delivery; do not give if patient is receiving ketorolac (Toradol)). 30 Tab 2   • ferrous sulfate 325 (65 Fe) MG tablet Take 1 Tab by mouth 3 times a day, with meals. 30 Tab 2   • docusate sodium 100 MG Cap Take 100 mg by mouth 2 times a day as needed for Constipation. 60 Cap 2   • Prenatal MV-Min-Fe Fum-FA-DHA (PRENATAL 1 PO) Take  by mouth.       No current facility-administered medications on file prior to visit.      Social history-  Social History     Social History   • Marital status: Single     Spouse name: N/A   • Number of children: N/A   • Years of education: N/A     Occupational History   • Not on file.     Social History Main Topics   • Smoking status: Never Smoker   • Smokeless tobacco: Never Used   • Alcohol use No   • Drug use: No   • Sexual activity: Yes     Partners: Male     Birth control/ protection: Condom     Other Topics Concern   • Not on file     Social History Narrative   • No narrative on file     Past Family History-no history of breast or ovarian  cancer    Physical examination;      Impression;  Family planning    Plan;  Discussed all forms of birth control with emphasis on nexplanon discussed all the risks benefits alternatives discussed the chance of pregnancy the risk of irregular vaginal bleeding mood swings. Irregular vaginal bleeding is most common side effect of subdermal implants. Discussed this in extensive detail      Referral placed

## 2018-02-06 NOTE — NON-PROVIDER
GYN visit for Nexplanon Consult  LMP: none yet. Had C/Section 12/05/2017  Breastfeeding only  WT:182 lb  BP: 100/62  Good # [phone]

## 2018-03-30 ENCOUNTER — GYNECOLOGY VISIT (OUTPATIENT)
Dept: OBGYN | Facility: CLINIC | Age: 28
End: 2018-03-30
Payer: MEDICAID

## 2018-03-30 VITALS — WEIGHT: 181 LBS | SYSTOLIC BLOOD PRESSURE: 108 MMHG | DIASTOLIC BLOOD PRESSURE: 74 MMHG | BODY MASS INDEX: 28.35 KG/M2

## 2018-03-30 DIAGNOSIS — Z30.017 NEXPLANON INSERTION: ICD-10-CM

## 2018-03-30 LAB
INT CON NEG: NEGATIVE
INT CON POS: NEGATIVE
POC URINE PREGNANCY TEST: NEGATIVE

## 2018-03-30 PROCEDURE — 11981 INSERTION DRUG DLVR IMPLANT: CPT | Performed by: OBSTETRICS & GYNECOLOGY

## 2018-03-30 PROCEDURE — 81025 URINE PREGNANCY TEST: CPT | Performed by: OBSTETRICS & GYNECOLOGY

## 2018-03-30 NOTE — NON-PROVIDER
Pt here for Nexplanon Insertion   Negative UPT  Pharmacy Confirmed   #: [phone]  C/O: None

## 2018-03-30 NOTE — PROGRESS NOTES
Procedure note; Nexplanon insertion;    Informed consent obtained, consent signed-discussed the risks of Nexplanon; pain, bleeding, infection, bruising, possible pregnancy, difficulty with removing implant, possible scarring to arm    Urine hCG negative    Patient positioned supine with nondominant arm exposed.    Medial epicondyle of left arm palpated    Surgical jag placed 8-10 cm medial to the medial epicondyle    Betadine prep the skin    Local anesthesia-1% lidocaine injected using 2 inch needle approximately 4cm underneath skin    Implant inserted at a 30 degree angle to the skin    Steri-Strip placed    Pressure bandage placed    Patient instructed to remove gauze in 24 hours    Patient instructed to remove Steri-Strip at 3-5 days    Patient tolerated the procedure well    Followup in 2 weeks for postop checkup

## 2019-08-12 ENCOUNTER — OFFICE VISIT (OUTPATIENT)
Dept: URGENT CARE | Facility: PHYSICIAN GROUP | Age: 29
End: 2019-08-12
Payer: MEDICAID

## 2019-08-12 VITALS
WEIGHT: 178 LBS | TEMPERATURE: 98.1 F | DIASTOLIC BLOOD PRESSURE: 68 MMHG | HEART RATE: 78 BPM | RESPIRATION RATE: 16 BRPM | SYSTOLIC BLOOD PRESSURE: 122 MMHG | BODY MASS INDEX: 27.88 KG/M2 | OXYGEN SATURATION: 97 %

## 2019-08-12 DIAGNOSIS — M54.6 ACUTE RIGHT-SIDED THORACIC BACK PAIN: ICD-10-CM

## 2019-08-12 LAB
APPEARANCE UR: CLEAR
BILIRUB UR STRIP-MCNC: NORMAL MG/DL
COLOR UR AUTO: YELLOW
GLUCOSE UR STRIP.AUTO-MCNC: NORMAL MG/DL
INT CON NEG: NORMAL
INT CON POS: NORMAL
KETONES UR STRIP.AUTO-MCNC: NORMAL MG/DL
LEUKOCYTE ESTERASE UR QL STRIP.AUTO: NORMAL
NITRITE UR QL STRIP.AUTO: NORMAL
PH UR STRIP.AUTO: 6 [PH] (ref 5–8)
POC URINE PREGNANCY TEST: NEGATIVE
PROT UR QL STRIP: NORMAL MG/DL
RBC UR QL AUTO: NORMAL
SP GR UR STRIP.AUTO: 1.02
UROBILINOGEN UR STRIP-MCNC: 0.2 MG/DL

## 2019-08-12 PROCEDURE — 81002 URINALYSIS NONAUTO W/O SCOPE: CPT | Performed by: PHYSICIAN ASSISTANT

## 2019-08-12 PROCEDURE — 99204 OFFICE O/P NEW MOD 45 MIN: CPT | Mod: 25 | Performed by: PHYSICIAN ASSISTANT

## 2019-08-12 PROCEDURE — 81025 URINE PREGNANCY TEST: CPT | Performed by: PHYSICIAN ASSISTANT

## 2019-08-12 RX ORDER — KETOROLAC TROMETHAMINE 30 MG/ML
30 INJECTION, SOLUTION INTRAMUSCULAR; INTRAVENOUS ONCE
Status: COMPLETED | OUTPATIENT
Start: 2019-08-12 | End: 2019-08-12

## 2019-08-12 RX ORDER — CYCLOBENZAPRINE HCL 5 MG
5-10 TABLET ORAL NIGHTLY PRN
Qty: 20 TAB | Refills: 0 | Status: SHIPPED | OUTPATIENT
Start: 2019-08-12 | End: 2020-08-14

## 2019-08-12 RX ORDER — IBUPROFEN 800 MG/1
800 TABLET ORAL EVERY 8 HOURS PRN
Qty: 30 TAB | Refills: 0 | Status: SHIPPED | OUTPATIENT
Start: 2019-08-12 | End: 2020-08-14

## 2019-08-12 RX ADMIN — KETOROLAC TROMETHAMINE 30 MG: 30 INJECTION, SOLUTION INTRAMUSCULAR; INTRAVENOUS at 14:53

## 2019-08-12 ASSESSMENT — PAIN SCALES - GENERAL: PAINLEVEL: 7=MODERATE-SEVERE PAIN

## 2019-08-12 NOTE — PROGRESS NOTES
Chief Complaint   Patient presents with   • Back Pain     R side pain x3d/ Non injury related       HISTORY OF PRESENT ILLNESS: Patient is a 29 y.o. female who presents today for about 3 days of worsening right thoracic back pain.  Patient states she has never had pain in this area before.   She does lift heavy at work and does do exercises/stretches that may have contributed but nothing in particular she feels she did to strain.  Denies work related injury.     No urinary symptoms, frequency or urgent urination.  No blood in urine.   No hx of kidney stones. No nausea or vomiting.  She feels it hurts to take breaths but only if she is sitting up and can take deep breaths without pain laying down.  No SOB.   She is on Nexplanon.    Never smoker.   No hx of blood clots.  No leg swelling.   No new bed or mattress she has been sleeping on.  No coughing.   No fevers.    She tried heat, ice, epsom salt soak and stretches without relief.    She took 2 Ibuprofen yesterday with mild relief.  Left work early today.     Patient Active Problem List    Diagnosis Date Noted   • Postpartum care following  delivery 2018       Allergies:Patient has no known allergies.    Current Outpatient Medications Ordered in Epic   Medication Sig Dispense Refill   • Prenatal MV-Min-Fe Fum-FA-DHA (PRENATAL 1 PO) Take  by mouth.       No current Epic-ordered facility-administered medications on file.        No past medical history on file.    Social History     Tobacco Use   • Smoking status: Never Smoker   • Smokeless tobacco: Never Used   Substance Use Topics   • Alcohol use: No   • Drug use: No       Family Status   Relation Name Status   • Mo   at age 65   • Fa   at age 70   • Sis 1 Alive   • MGMo     • MGFa     • PGMo     • PGFa       Family History   Problem Relation Age of Onset   • Diabetes Mother    • Hypertension Mother    • Diabetes Father    • Hypertension Father    • No  Known Problems Sister    • Diabetes Paternal Grandfather    • Hypertension Paternal Grandfather        ROS:  Review of Systems   Constitutional: Negative for fever, chills, weight loss and malaise/fatigue.   HENT: Negative for ear pain, nosebleeds, congestion, sore throat and neck pain.    Eyes: Negative for blurred vision.   Respiratory: Negative for cough, sputum production, shortness of breath and wheezing.    Cardiovascular: Negative for chest pain, palpitations, orthopnea and leg swelling.   Gastrointestinal: Negative for heartburn, nausea, vomiting and abdominal pain.   Genitourinary: Negative for dysuria, urgency and frequency.   Musculoskeletal: SEE HPI  All other systems reviewed and are negative.       Exam:  /68   Pulse 78   Temp 36.7 °C (98.1 °F) (Temporal)   Resp 16   Wt 80.7 kg (178 lb)   SpO2 97%   General:  Well nourished, well developed female in NAD  Head: Normocephalic/atraumatic.   Eyes: PERRLA, EOM within normal limits, no conjunctival injection, no scleral icterus, visual fields and acuity grossly intact.  Ears: Normal shape and symmetry, no tenderness, no discharge. External canals are without any significant edema or erythema. Tympanic membranes are without any inflammation, no effusion. Gross auditory acuity is intact  Nose: Symmetrical, sinuses without tenderness, no discharge.   Mouth: reasonable hygiene, no erythema exudates or tonsillar enlargement.  Neck: no masses, range of motion within normal limits, no tracheal deviation. No lymphadenopathy  Pulmonary: Normal respiratory effort, no wheezes, crackles, or rhonchi.  Cardiovascular: regular rate and rhythm without murmurs, rubs, or gallops.  Abdomen: Soft, nontender, nondistended. Normal bowel sounds. No hepatosplenomegaly or masses, or hernias. No rebound or guarding. Negative Adrian's sign.  No CVA tenderness.   Musculoskeletal: Back: No swelling ecchymosis or deformity.  No midline TTP.  There is TTP and spasm of the  erector spinae group of the mid lower right thoracic region into upper lumbar region.  Pain with flexion and lateral rotation of spine.   Skin: No visible rashes or lesion. Warm, pink, dry.   Extremities: no clubbing, cyanosis, or edema.  Neuro: A&O x 3. Speech normal/clear.  Normal gait.   Psych: Normal mood/affect      Assessment/Plan:  1. Acute right-sided thoracic back pain  POCT Urinalysis    POCT Pregnancy    ketorolac (TORADOL) injection 30 mg    ibuprofen (MOTRIN) 800 MG Tab    cyclobenzaprine (FLEXERIL) 5 MG tablet            -U/A and pregnancy negative.   -benign abdominal exam, lungs CTA  -consistent with musculoskeletal pain at this time, likely exacerbated by gym work outs and strenuous job.   -Toradol shot given in clinic, patient tolerated well.  Monitored for 10 mins s/p shot.   -back care discussed, proper lifting mechanics discussed  -recommend heat/ice prn.  Gentle stretches daily.   -Flexeril if needed for bedtime/spasms. No driving, caution drowsy.   -back pain red flags, new symptoms or concern ER precautions discussed with patient.       Supportive care, differential diagnoses, and indications for immediate follow-up discussed with patient.   Pathogenesis of diagnosis discussed including typical length and natural progression.   Instructed to return to clinic or nearest emergency department for any change in condition, further concerns, or worsening of symptoms.  Patient states understanding of the plan of care and discharge instructions.      Tran Calix P.A.-C.

## 2019-08-12 NOTE — LETTER
August 12, 2019         Patient: Sierra Villegas   YOB: 1990   Date of Visit: 8/12/2019           To Whom it May Concern:    Sierra Villegas was seen in my clinic on 8/12/2019.  Please excuse her from missed work today.     If you have any questions or concerns, please don't hesitate to call.        Sincerely,           Tran Calix P.A.-C.  Electronically Signed

## 2019-11-07 ENCOUNTER — GYNECOLOGY VISIT (OUTPATIENT)
Dept: OBGYN | Facility: CLINIC | Age: 29
End: 2019-11-07
Payer: MEDICAID

## 2019-11-07 VITALS — WEIGHT: 178 LBS | BODY MASS INDEX: 27.88 KG/M2 | DIASTOLIC BLOOD PRESSURE: 60 MMHG | SYSTOLIC BLOOD PRESSURE: 108 MMHG

## 2019-11-07 DIAGNOSIS — Z30.46 NEXPLANON REMOVAL: ICD-10-CM

## 2019-11-07 PROCEDURE — 11982 REMOVE DRUG IMPLANT DEVICE: CPT | Performed by: OBSTETRICS & GYNECOLOGY

## 2019-11-07 NOTE — PROGRESS NOTES
Pt here to discuss having Nexplanon removed  # [phone]  Pt states would like to have another baby .   Nexplanon was placed on 3/30/19

## 2019-11-07 NOTE — PROCEDURES
Nexplanon Removal :  Patient given informed consent, and is aware that removal may take longer, require more anesthesia and time.  It also can make a scar slightly, but not substantially larger, than that used for insertion .   Patient is aware that we recommend alternative contraception, until she achieves a normal cycle after removal. Patient is aware that removal, requires identification of the entire implant, either by visual inspection or by xray imagery.     After prepped and draping of left upper inner forearm, implant visually identified and site of insertion marked.   Lidocaine 1% with epi infiltrated both distal and deep to the prior insertion site for anesthesia.   Utilizing a scalpel # 15, a small vertical incision was made near scar of prior insertion site in the site of maximal implant visualization.   Implant identified and capsule was incised with scalpel.  Implant grasped with mosquito forceps and was easily removed, examined and found to be intact/complete.     Hemostasis at removal site achieved with pressure.  Incision closed with Steri Strips. The forearm was then wrapped with a pressure dressing.   Instructions for post operative care given .   Patient tolerated the procedure well.     Remove outer adhesive roll gauze after 24 hrs and steri strips after 3-4 days.

## 2019-11-07 NOTE — PROGRESS NOTES
GYN Consult    CC/reason for consult: ***    HPI: Sierra Villegas is a 29 y.o.  with ***      ROS:  constitutional: denies fevers, general concerns  CV: denies chest pain, palpitations, edema  Resp: denies shortness of breath, cough  GI: denies abd pain, N/V, diarrhea/constipation, blood in stool  : denies irregular vaginal bleeding, discharge, pain, denies urinary complaints  Neuro: denies*** hx of migraines w/ aura  Endo: denies significant weight changes, irregular menses, temperature intolerance, denies hotflashes/nightsweats  Heme/lymph: denies easy bleeding/bruising, denies swollen glands  Psych: denies concerns about mood, denies SI***  Allergy: denies concerns        GYN History:  LMP***. Menarche @***.  Menses ***regular, lasting ***days, not particularly heavy***.  *** h/o abnormal pap, nor history of cone biopsy, LEEP or any other cervical, uterine or gynecologic surgery. ***No history of sexually transmitted diseases.       OB history:  ***: ***CS at *** for***.  Pregnancy uncomplicated***c/b***.  *** lbs *** oz, ***male baby in *** good health.  OB History    Para Term  AB Living   3 3 3     3   SAB TAB Ectopic Molar Multiple Live Births             3      # Outcome Date GA Lbr Trevor/2nd Weight Sex Delivery Anes PTL Lv   3 Term 17 39w2d  3.42 kg (7 lb 8.6 oz) F CS-LTranv Spinal N JESSE      Birth Comments: Primary C/S, breech   2 Term 11 39w0d  3.6 kg (7 lb 15 oz) F Vag-Spont EPI N JESSE      Birth Comments: Pt states no complications.    1 Term 10/18/08 39w0d  3.657 kg (8 lb 1 oz) M Vag-Spont EPI N JESSE      Birth Comments: Pt states no complications.

## 2019-11-08 ENCOUNTER — TELEPHONE (OUTPATIENT)
Dept: OBGYN | Facility: CLINIC | Age: 29
End: 2019-11-08

## 2019-11-08 NOTE — TELEPHONE ENCOUNTER
Pt called returning a call from Samara.  Gia unavailable at the time, will have her call pt back. Pt understood and agreed.

## 2020-08-14 ENCOUNTER — OFFICE VISIT (OUTPATIENT)
Dept: URGENT CARE | Facility: PHYSICIAN GROUP | Age: 30
End: 2020-08-14
Payer: COMMERCIAL

## 2020-08-14 ENCOUNTER — HOSPITAL ENCOUNTER (OUTPATIENT)
Facility: MEDICAL CENTER | Age: 30
End: 2020-08-14
Attending: NURSE PRACTITIONER
Payer: COMMERCIAL

## 2020-08-14 VITALS
BODY MASS INDEX: 31.08 KG/M2 | HEIGHT: 67 IN | HEART RATE: 60 BPM | WEIGHT: 198 LBS | RESPIRATION RATE: 18 BRPM | SYSTOLIC BLOOD PRESSURE: 126 MMHG | OXYGEN SATURATION: 98 % | DIASTOLIC BLOOD PRESSURE: 78 MMHG | TEMPERATURE: 98.6 F

## 2020-08-14 DIAGNOSIS — R09.81 NASAL CONGESTION: ICD-10-CM

## 2020-08-14 DIAGNOSIS — Z20.822 SUSPECTED 2019 NOVEL CORONAVIRUS INFECTION: ICD-10-CM

## 2020-08-14 DIAGNOSIS — R50.9 FEVER, UNSPECIFIED FEVER CAUSE: ICD-10-CM

## 2020-08-14 DIAGNOSIS — J01.40 ACUTE NON-RECURRENT PANSINUSITIS: ICD-10-CM

## 2020-08-14 DIAGNOSIS — R05.9 COUGH: ICD-10-CM

## 2020-08-14 LAB
COVID ORDER STATUS COVID19: NORMAL
SARS-COV-2 RNA RESP QL NAA+PROBE: NOTDETECTED
SPECIMEN SOURCE: NORMAL

## 2020-08-14 PROCEDURE — 99214 OFFICE O/P EST MOD 30 MIN: CPT | Performed by: NURSE PRACTITIONER

## 2020-08-14 PROCEDURE — U0003 INFECTIOUS AGENT DETECTION BY NUCLEIC ACID (DNA OR RNA); SEVERE ACUTE RESPIRATORY SYNDROME CORONAVIRUS 2 (SARS-COV-2) (CORONAVIRUS DISEASE [COVID-19]), AMPLIFIED PROBE TECHNIQUE, MAKING USE OF HIGH THROUGHPUT TECHNOLOGIES AS DESCRIBED BY CMS-2020-01-R: HCPCS

## 2020-08-14 PROCEDURE — 99000 SPECIMEN HANDLING OFFICE-LAB: CPT | Performed by: NURSE PRACTITIONER

## 2020-08-14 RX ORDER — AMOXICILLIN AND CLAVULANATE POTASSIUM 875; 125 MG/1; MG/1
1 TABLET, FILM COATED ORAL 2 TIMES DAILY
Qty: 10 TAB | Refills: 0 | Status: SHIPPED | OUTPATIENT
Start: 2020-08-14 | End: 2020-08-19

## 2020-08-14 ASSESSMENT — ENCOUNTER SYMPTOMS
DIARRHEA: 0
DIZZINESS: 0
CHILLS: 0
CLAUDICATION: 0
DEPRESSION: 0
SHORTNESS OF BREATH: 0
WHEEZING: 0
SORE THROAT: 1
ABDOMINAL PAIN: 0
PALPITATIONS: 0
NAUSEA: 0
EYE DISCHARGE: 0
NECK PAIN: 0
COUGH: 1
FEVER: 1
EYE REDNESS: 0
MYALGIAS: 1
HEMOPTYSIS: 0
VOMITING: 0
HEADACHES: 0
SPUTUM PRODUCTION: 0

## 2020-08-14 NOTE — PROGRESS NOTES
"Subjective:      Sierra Villegas is a 30 y.o. female who presents with Nasal Congestion (severe congestion, runny nose, thinks sinus infection, congestion is going to chest now has cough x1 week )            Patient presents to urgent care with complaint of cough, congestion and sinus pain x1 week.  Patient reports that approximately 2 weeks ago she did have a fever and body aches which improved and she was feeling better and then she got sick again with worsening nasal symptoms.  She denies known COVID-19 exposure however she does work in a grocery store.  Her entire family was sick 2 weeks ago when she originally had fever and body aches and they have all recovered.  Nobody has had COVID-19 testing.  She has no history of sinus infections.  She has been self treating with cool mist humidifier and OTC medications with no improvement.  She denies shortness of breath or chest pain.       Review of Systems   Constitutional: Positive for fever and malaise/fatigue. Negative for chills.   HENT: Positive for congestion and sore throat.    Eyes: Negative for discharge and redness.   Respiratory: Positive for cough. Negative for hemoptysis, sputum production, shortness of breath and wheezing.    Cardiovascular: Negative for chest pain, palpitations and claudication.   Gastrointestinal: Negative for abdominal pain, diarrhea, nausea and vomiting.   Musculoskeletal: Positive for myalgias. Negative for joint pain and neck pain.   Skin: Negative for rash.   Neurological: Negative for dizziness and headaches.   Psychiatric/Behavioral: Negative for depression and suicidal ideas.   All other systems reviewed and are negative.         Objective:     /78   Pulse 60   Temp 37 °C (98.6 °F)   Resp 18   Ht 1.702 m (5' 7\")   Wt 89.8 kg (198 lb)   SpO2 98%   BMI 31.01 kg/m²      Physical Exam  Vitals signs reviewed.   Constitutional:       General: She is not in acute distress.     Appearance: Normal appearance. She is " not ill-appearing.   HENT:      Head: Normocephalic and atraumatic.      Right Ear: Tympanic membrane, ear canal and external ear normal.      Left Ear: Tympanic membrane, ear canal and external ear normal.      Nose: Congestion and rhinorrhea present.      Right Sinus: Maxillary sinus tenderness and frontal sinus tenderness present.      Left Sinus: Maxillary sinus tenderness and frontal sinus tenderness present.      Mouth/Throat:      Mouth: Mucous membranes are moist.      Pharynx: Posterior oropharyngeal erythema present. No oropharyngeal exudate.   Eyes:      Extraocular Movements: Extraocular movements intact.      Conjunctiva/sclera: Conjunctivae normal.      Pupils: Pupils are equal, round, and reactive to light.   Neck:      Musculoskeletal: Normal range of motion and neck supple.   Cardiovascular:      Rate and Rhythm: Normal rate and regular rhythm.      Pulses: Normal pulses.      Heart sounds: No friction rub. No gallop.    Pulmonary:      Effort: Pulmonary effort is normal. No respiratory distress.      Breath sounds: Normal breath sounds. No wheezing, rhonchi or rales.   Abdominal:      General: Bowel sounds are normal. There is no distension.      Palpations: Abdomen is soft. There is no mass.      Tenderness: There is no abdominal tenderness. There is no right CVA tenderness or left CVA tenderness.   Musculoskeletal: Normal range of motion.         General: No tenderness.      Right lower leg: No edema.      Left lower leg: No edema.   Lymphadenopathy:      Cervical: No cervical adenopathy.   Skin:     General: Skin is warm and dry.      Capillary Refill: Capillary refill takes less than 2 seconds.   Neurological:      Mental Status: She is alert and oriented to person, place, and time.   Psychiatric:         Mood and Affect: Mood normal.         Behavior: Behavior normal.                 Assessment/Plan:        1. Suspected 2019 novel coronavirus infection  - COVID/SARS COV-2 PCR; Future    2.  Acute non-recurrent pansinusitis  FU with PCP or return to Urgent Care in 5-7 days if no improvement in symptoms, sooner if increased or worsening symptoms.   Humidifier at noc may be beneficial.   OTC antihistamine of choice - non-drowsy. Take as directed by   OTC fluticasone of choice- Take as directed by   Keep well hydrated    - amoxicillin-clavulanate (AUGMENTIN) 875-125 MG Tab; Take 1 Tab by mouth 2 times a day for 5 days.  Dispense: 10 Tab; Refill: 0    3. Fever, unspecified fever cause  4. Cough  5. Nasal congestion    Vitals are stable at this time.  Patient is advised to self isolate and provided with self isolation precautions AVS information.  Discussed when to return to urgent care or ER including for worsening shortness of breath.  Patient verbalized understanding and has no additional questions.    Plan of care, medications and treatments reviewed with patient and or guardian.  Patient and or guardian voices understanding and agrees with the instructions provided. After visit summary reviewed with patient. Patient and or guardian understand the parameters for reevaluation and ER precautions discussed.     Follow up with primary care provider in the next 1-5 days for recheck as needed.  Discussed that urgent care setting has limited resources, therefore any worsening of symptoms should be evaluated in the ER. Patient and or guardian verbalized understanding.     Please note that this dictation was created using voice recognition software. I have made every reasonable attempt to correct obvious errors, but I expect that there are errors of grammar and possibly content that I did not discover before finalizing the note.

## 2020-08-14 NOTE — LETTER
August 14, 2020         Patient: Sierra Villegas   YOB: 1990   Date of Visit: 8/14/2020           To Whom it May Concern:    Sierra Villegas was seen in my clinic on 8/14/2020.     If you have any questions or concerns, please don't hesitate to call.        Sincerely,           TONYA Adams.  Electronically Signed

## 2020-08-20 ENCOUNTER — TELEPHONE (OUTPATIENT)
Dept: URGENT CARE | Facility: PHYSICIAN GROUP | Age: 30
End: 2020-08-20

## 2020-08-20 NOTE — TELEPHONE ENCOUNTER
Called patient she understands her results an said she feels much better.  She also wanted to thank you again for making her feel better.

## 2020-08-20 NOTE — TELEPHONE ENCOUNTER
----- Message from GERMAN Adams sent at 8/15/2020  9:11 AM PDT -----  Please let patient know her Covid 19 test was negative.  Thank you,  Araceli

## 2020-10-20 ENCOUNTER — INITIAL PRENATAL (OUTPATIENT)
Dept: OBGYN | Facility: CLINIC | Age: 30
End: 2020-10-20
Payer: COMMERCIAL

## 2020-10-20 VITALS — DIASTOLIC BLOOD PRESSURE: 77 MMHG | WEIGHT: 187.2 LBS | SYSTOLIC BLOOD PRESSURE: 129 MMHG | BODY MASS INDEX: 29.32 KG/M2

## 2020-10-20 DIAGNOSIS — N93.8 DUB (DYSFUNCTIONAL UTERINE BLEEDING): ICD-10-CM

## 2020-10-20 DIAGNOSIS — O34.219 HISTORY OF CESAREAN DELIVERY, CURRENTLY PREGNANT: ICD-10-CM

## 2020-10-20 LAB
INT CON NEG: NEGATIVE
INT CON POS: POSITIVE
POC URINE PREGNANCY TEST: POSITIVE

## 2020-10-20 PROCEDURE — 99213 OFFICE O/P EST LOW 20 MIN: CPT | Performed by: OBSTETRICS & GYNECOLOGY

## 2020-10-20 PROCEDURE — 81025 URINE PREGNANCY TEST: CPT | Performed by: OBSTETRICS & GYNECOLOGY

## 2020-10-20 NOTE — NON-PROVIDER
"DUB/Pregnancy Test  LMP:7/31/2020  UPT positive, done in clinic.  Good phone #:[phone]  Pharmacy verified.  Pt states no other complaints for today.  Pt states \"  and planned pregnancy. FOB is involved and supportive.\"          "

## 2020-10-20 NOTE — PROGRESS NOTES
Chief complaint: Dysfunctional uterine bleeding    Sierra Villegas,  30 y.o.  female  at approximately 11 weeks and 4 days gestational age by last menstrual period of approximately July 2020.    Subjective : Patient presents to the office for absent menses.  Patient has a history of 2 vaginal deliveries and 1  delivery in 2017 secondary to breech presentation.  Nausea/Vomiting: No    Abdominal /pelvic cramping: No  Vaginal bleeding: No  Positive home UPT   Other symptoms: None    Pertinent positives documented in HPI and all other systems reviewed & are negative    OB History    Para Term  AB Living   4 3 3     3   SAB TAB Ectopic Molar Multiple Live Births             3      # Outcome Date GA Lbr Trevor/2nd Weight Sex Delivery Anes PTL Lv   4 Current            3 Term 17 39w2d  3.42 kg (7 lb 8.6 oz) F CS-LTranv Spinal N JESSE      Birth Comments: Primary C/S, breech   2 Term 11 39w0d  3.6 kg (7 lb 15 oz) F Vag-Spont EPI N JESSE      Birth Comments: Pt states no complications.    1 Term 10/18/08 39w0d  3.657 kg (8 lb 1 oz) M Vag-Spont EPI N JESSE      Birth Comments: Pt states no complications.        Past Gyn history: last pap unknown, hx STDs denies    History reviewed. No pertinent past medical history.    Past Surgical History:   Procedure Laterality Date   • PRIMARY C SECTION N/A 2017    Procedure: PRIMARY C SECTION;  Surgeon: Vandana Cueto M.D.;  Location: LABOR AND DELIVERY;  Service: Labor and Delivery       Meds: Prenatal vitamins    Allergies: Patient has no known allergies.    Physical Exam:    /77   Wt 84.9 kg (187 lb 3.2 oz)   BMI 29.32 kg/m²   Gen: well-appearing, well-hydrated, well-nourished  HEENT: normal;   PERRLA, EOMI, sclera clear  Lungs: Clear to auscultation  Heart: RRR No M  Abd: abdomen is soft without significant tenderness, masses, organomegaly or guarding  Pelvic: External genitalia normal  Ext: NT bilaterally,  no cyanosis, clubbing or edema    Recent Results (from the past 336 hour(s))   POCT Pregnancy    Collection Time: 10/20/20  9:10 AM   Result Value Ref Range    POC Urine Pregnancy Test POSITIVE Negative    Internal Control Positive Positive     Internal Control Negative Negative        Ultrasound:     Transvaginal US performed and per my read:    Indication: Dating    Findings: royal intrauterine pregnancy @9 weeks and 5 days by CRL.   Positive gestational sac.  Positive yolk sac.   Positive fetal cardiac activity @168 BPM.   Right ovary normal. Left Ovary normal. Cervical length 3.9 cm.   No free fluid in the cul-de-sac.    Impression: viable IUP @9 weeks and 5 days. EDC by US of May 20, 2021      Assessment:  30 y.o.   dysfunctional uterine bleeding  Pregnancy exam/test positive    Plan:  4 weeks for new OB appt  Pap smear at new OB visit  Normal pregnancy symptoms discussed  SAB/labor precautions educated  Order prenatal labs and any additional imaging needed at new OB visit  Drink at least 2 liters of water daily  Exercise 30 minutes daily  Call MD w/ questions or concerns    We will also discuss CF, SMA, NIPT versus quad screen at next visit.    Discussed with patient options for delivery.  Patient is interested in a repeat low transverse  with bilateral tubal ligation at this time.    Final due date May 20, 2021 consistent with today's ultrasound

## 2020-11-24 ENCOUNTER — INITIAL PRENATAL (OUTPATIENT)
Dept: OBGYN | Facility: CLINIC | Age: 30
End: 2020-11-24
Payer: COMMERCIAL

## 2020-11-24 ENCOUNTER — HOSPITAL ENCOUNTER (OUTPATIENT)
Facility: MEDICAL CENTER | Age: 30
End: 2020-11-24
Attending: OBSTETRICS & GYNECOLOGY
Payer: COMMERCIAL

## 2020-11-24 VITALS — WEIGHT: 190.6 LBS | BODY MASS INDEX: 29.85 KG/M2 | SYSTOLIC BLOOD PRESSURE: 115 MMHG | DIASTOLIC BLOOD PRESSURE: 80 MMHG

## 2020-11-24 DIAGNOSIS — Z98.891 PREVIOUS CESAREAN SECTION: ICD-10-CM

## 2020-11-24 DIAGNOSIS — Z34.82 PRENATAL CARE, SUBSEQUENT PREGNANCY IN SECOND TRIMESTER: ICD-10-CM

## 2020-11-24 PROCEDURE — 90471 IMMUNIZATION ADMIN: CPT | Performed by: OBSTETRICS & GYNECOLOGY

## 2020-11-24 PROCEDURE — 90686 IIV4 VACC NO PRSV 0.5 ML IM: CPT | Performed by: OBSTETRICS & GYNECOLOGY

## 2020-11-24 PROCEDURE — 59401 PR NEW OB VISIT: CPT | Performed by: OBSTETRICS & GYNECOLOGY

## 2020-11-24 PROCEDURE — 87591 N.GONORRHOEAE DNA AMP PROB: CPT

## 2020-11-24 PROCEDURE — 88175 CYTOPATH C/V AUTO FLUID REDO: CPT

## 2020-11-24 PROCEDURE — 87624 HPV HI-RISK TYP POOLED RSLT: CPT

## 2020-11-24 PROCEDURE — 87491 CHLMYD TRACH DNA AMP PROBE: CPT

## 2020-11-24 ASSESSMENT — EDINBURGH POSTNATAL DEPRESSION SCALE (EPDS)
I HAVE FELT SAD OR MISERABLE: NO, NOT AT ALL
THINGS HAVE BEEN GETTING ON TOP OF ME: NO, I HAVE BEEN COPING AS WELL AS EVER
I HAVE BEEN ANXIOUS OR WORRIED FOR NO GOOD REASON: NO, NOT AT ALL
I HAVE FELT SCARED OR PANICKY FOR NO GOOD REASON: NO, NOT AT ALL
I HAVE LOOKED FORWARD WITH ENJOYMENT TO THINGS: AS MUCH AS I EVER DID
TOTAL SCORE: 0
I HAVE BEEN SO UNHAPPY THAT I HAVE BEEN CRYING: NO, NEVER
THE THOUGHT OF HARMING MYSELF HAS OCCURRED TO ME: NEVER
I HAVE BEEN SO UNHAPPY THAT I HAVE HAD DIFFICULTY SLEEPING: NOT AT ALL
I HAVE BLAMED MYSELF UNNECESSARILY WHEN THINGS WENT WRONG: NO, NEVER
I HAVE BEEN ABLE TO LAUGH AND SEE THE FUNNY SIDE OF THINGS: AS MUCH AS I ALWAYS COULD

## 2020-11-24 NOTE — PROGRESS NOTES
Pt here for NOB visit  LMP:7/31/2020      : 10/20/2020   JUD:5/20/2021  Good Phone #:[phone]  Pharmacy verified.  Last Pap:10/20/2017, neg result in Labs  Pt declines CF.  Pt desires AFP.  Pt states having mild cramps for on and off.  Pt states no other complaints for today.  EPDS:0  Pt desires Flu vaccine today.  Flu vaccine given today. Left Deltoid. VIS given and screening check list reviewed with pt.  Flu vaccine verified by Yadi Will MA.

## 2020-11-24 NOTE — PROGRESS NOTES
Initial OB;    Sierra Villegas is 30 y.o.  female ,Patient's last menstrual period was 2020. at 14w5d. She denies current complaints.    Past OB history-  OB History    Para Term  AB Living   4 3 3     3   SAB TAB Ectopic Molar Multiple Live Births             3      # Outcome Date GA Lbr Trevor/2nd Weight Sex Delivery Anes PTL Lv   4 Current            3 Term 17 39w2d  3.42 kg (7 lb 8.6 oz) F CS-LTranv Spinal N JESSE      Birth Comments: Primary C/S, breech   2 Term 11 39w0d  3.6 kg (7 lb 15 oz) F Vag-Spont EPI N JESSE      Birth Comments: Pt states no complications.    1 Term 10/18/08 39w0d  3.657 kg (8 lb 1 oz) M Vag-Spont EPI N JESSE      Birth Comments: Pt states no complications.      Past medical history-History reviewed. No pertinent past medical history.  Past surgical history-  Past Surgical History:   Procedure Laterality Date   • PRIMARY C SECTION N/A 2017    Procedure: PRIMARY C SECTION;  Surgeon: Vandana Cueto M.D.;  Location: LABOR AND DELIVERY;  Service: Labor and Delivery     Allergies-Patient has no known allergies.  Medications-  No current facility-administered medications for this visit.      Last reviewed on 2020 10:50 AM by Sekou Cornelius M.D.    Influenza vaccine today    Size equals dates, positive fetal heart tones    See prenatal physical;    Impression;  Viable pregnancy at 14 weeks  Previous  section-for breech documented low transverse uterine  section with 2 layer closure-patient does wish to have repeat  section with permanent sterilization.    Plan;  Discussed proper diet/hydration during pregnancy  Discussed exercise recommendations during pregnancy  AFP tetra at next visit   Prenatal labs today  Followup in 4 weeks

## 2020-11-25 DIAGNOSIS — Z34.82 PRENATAL CARE, SUBSEQUENT PREGNANCY IN SECOND TRIMESTER: ICD-10-CM

## 2020-11-25 LAB
C TRACH DNA GENITAL QL NAA+PROBE: NEGATIVE
CYTOLOGY REG CYTOL: NORMAL
HPV HR 12 DNA CVX QL NAA+PROBE: NEGATIVE
HPV16 DNA SPEC QL NAA+PROBE: NEGATIVE
HPV18 DNA SPEC QL NAA+PROBE: NEGATIVE
N GONORRHOEA DNA GENITAL QL NAA+PROBE: NEGATIVE
SPECIMEN SOURCE: NORMAL
SPECIMEN SOURCE: NORMAL

## 2020-11-30 ENCOUNTER — HOSPITAL ENCOUNTER (OUTPATIENT)
Dept: LAB | Facility: MEDICAL CENTER | Age: 30
End: 2020-11-30
Attending: OBSTETRICS & GYNECOLOGY
Payer: COMMERCIAL

## 2020-11-30 DIAGNOSIS — Z34.82 PRENATAL CARE, SUBSEQUENT PREGNANCY IN SECOND TRIMESTER: ICD-10-CM

## 2020-11-30 LAB
ABO GROUP BLD: NORMAL
BASOPHILS # BLD AUTO: 0.4 % (ref 0–1.8)
BASOPHILS # BLD: 0.06 K/UL (ref 0–0.12)
BLD GP AB SCN SERPL QL: NORMAL
EOSINOPHIL # BLD AUTO: 0.18 K/UL (ref 0–0.51)
EOSINOPHIL NFR BLD: 1.2 % (ref 0–6.9)
ERYTHROCYTE [DISTWIDTH] IN BLOOD BY AUTOMATED COUNT: 41.6 FL (ref 35.9–50)
HBV SURFACE AG SER QL: ABNORMAL
HCT VFR BLD AUTO: 37 % (ref 37–47)
HCV AB SER QL: ABNORMAL
HGB BLD-MCNC: 12.8 G/DL (ref 12–16)
HIV 1+2 AB+HIV1 P24 AG SERPL QL IA: NORMAL
IMM GRANULOCYTES # BLD AUTO: 0.06 K/UL (ref 0–0.11)
IMM GRANULOCYTES NFR BLD AUTO: 0.4 % (ref 0–0.9)
LYMPHOCYTES # BLD AUTO: 2.35 K/UL (ref 1–4.8)
LYMPHOCYTES NFR BLD: 15.3 % (ref 22–41)
MCH RBC QN AUTO: 33.2 PG (ref 27–33)
MCHC RBC AUTO-ENTMCNC: 34.6 G/DL (ref 33.6–35)
MCV RBC AUTO: 96.1 FL (ref 81.4–97.8)
MONOCYTES # BLD AUTO: 0.79 K/UL (ref 0–0.85)
MONOCYTES NFR BLD AUTO: 5.2 % (ref 0–13.4)
NEUTROPHILS # BLD AUTO: 11.88 K/UL (ref 2–7.15)
NEUTROPHILS NFR BLD: 77.5 % (ref 44–72)
NRBC # BLD AUTO: 0 K/UL
NRBC BLD-RTO: 0 /100 WBC
PLATELET # BLD AUTO: 328 K/UL (ref 164–446)
PMV BLD AUTO: 9.5 FL (ref 9–12.9)
RBC # BLD AUTO: 3.85 M/UL (ref 4.2–5.4)
RH BLD: NORMAL
RUBV AB SER QL: 16.7 IU/ML
TREPONEMA PALLIDUM IGG+IGM AB [PRESENCE] IN SERUM OR PLASMA BY IMMUNOASSAY: ABNORMAL
WBC # BLD AUTO: 15.3 K/UL (ref 4.8–10.8)

## 2020-11-30 PROCEDURE — 80307 DRUG TEST PRSMV CHEM ANLYZR: CPT

## 2020-11-30 PROCEDURE — 87340 HEPATITIS B SURFACE AG IA: CPT

## 2020-11-30 PROCEDURE — 86850 RBC ANTIBODY SCREEN: CPT

## 2020-11-30 PROCEDURE — 87389 HIV-1 AG W/HIV-1&-2 AB AG IA: CPT

## 2020-11-30 PROCEDURE — 86901 BLOOD TYPING SEROLOGIC RH(D): CPT

## 2020-11-30 PROCEDURE — 36415 COLL VENOUS BLD VENIPUNCTURE: CPT

## 2020-11-30 PROCEDURE — 86900 BLOOD TYPING SEROLOGIC ABO: CPT

## 2020-11-30 PROCEDURE — 86803 HEPATITIS C AB TEST: CPT

## 2020-11-30 PROCEDURE — 85025 COMPLETE CBC W/AUTO DIFF WBC: CPT

## 2020-11-30 PROCEDURE — 86762 RUBELLA ANTIBODY: CPT

## 2020-11-30 PROCEDURE — 86780 TREPONEMA PALLIDUM: CPT

## 2020-12-02 LAB
AMPHET CTO UR CFM-MCNC: NEGATIVE NG/ML
BARBITURATES CTO UR CFM-MCNC: NEGATIVE NG/ML
BENZODIAZ CTO UR CFM-MCNC: NEGATIVE NG/ML
CANNABINOIDS CTO UR CFM-MCNC: NEGATIVE NG/ML
COCAINE CTO UR CFM-MCNC: NEGATIVE NG/ML
DRUG COMMENT 753798: NORMAL
METHADONE CTO UR CFM-MCNC: NEGATIVE NG/ML
OPIATES CTO UR CFM-MCNC: NEGATIVE NG/ML
PCP CTO UR CFM-MCNC: NEGATIVE NG/ML
PROPOXYPH CTO UR CFM-MCNC: NEGATIVE NG/ML

## 2020-12-28 ENCOUNTER — HOSPITAL ENCOUNTER (OUTPATIENT)
Dept: LAB | Facility: MEDICAL CENTER | Age: 30
End: 2020-12-28
Attending: OBSTETRICS & GYNECOLOGY
Payer: COMMERCIAL

## 2020-12-28 ENCOUNTER — ROUTINE PRENATAL (OUTPATIENT)
Dept: OBGYN | Facility: CLINIC | Age: 30
End: 2020-12-28
Payer: MEDICAID

## 2020-12-28 VITALS — WEIGHT: 198.1 LBS | BODY MASS INDEX: 31.03 KG/M2 | SYSTOLIC BLOOD PRESSURE: 110 MMHG | DIASTOLIC BLOOD PRESSURE: 80 MMHG

## 2020-12-28 DIAGNOSIS — Z34.82 PRENATAL CARE, SUBSEQUENT PREGNANCY IN SECOND TRIMESTER: ICD-10-CM

## 2020-12-28 PROCEDURE — 90040 PR PRENATAL FOLLOW UP: CPT | Performed by: OBSTETRICS & GYNECOLOGY

## 2020-12-28 PROCEDURE — 81511 FTL CGEN ABNOR FOUR ANAL: CPT

## 2020-12-28 PROCEDURE — 36415 COLL VENOUS BLD VENIPUNCTURE: CPT

## 2020-12-28 NOTE — PROGRESS NOTES
OB Followup;    19w4d    Patient Active Problem List    Diagnosis Date Noted   • History of  delivery, currently pregnant 10/20/2020   • Postpartum care following  delivery 2018       Vitals:    20 0806   BP: 110/80   Weight: 89.9 kg (198 lb 1.6 oz)       Patient presents for followup of OB care. Currently doing well . Good fetal movement no leakage of fluid no contractions or vaginal bleeding        Size equals dates, normal fetal heart rate      Labs; patient given lab slip for AFP tetra and fetal survey ultrasound  Patient requests repeat  section with permanent sterilization    Labor precautions given  Increase oral hydration  Discussed proper weight gain during pregnancy  Continue prenatal vitamins  Signs and symptoms of labor/ labor discussed   Discussed our group's policy on prenatal checkups and delivery    Followup in  4 weeks

## 2020-12-28 NOTE — PROGRESS NOTES
Pt's here for OB follow-up  Reports + fetal movement  No VB, LOF or UC's.  Good Phone #:[phone]  Pharmacy verified.   Pt states no complaints for today.  Pt desires AFP.

## 2020-12-30 LAB
# FETUSES US: NORMAL
AFP MOM SERPL: 1.32
AFP SERPL-MCNC: 58 NG/ML
AGE - REPORTED: 32 YR
CURRENT SMOKER: NO
FAMILY MEMBER DISEASES HX: NO
GA METHOD: NORMAL
GA: NORMAL WK
HCG MOM SERPL: 0.6
HCG SERPL-ACNC: NORMAL IU/L
HX OF HEREDITARY DISORDERS: NO
IDDM PATIENT QL: NO
INHIBIN A MOM SERPL: 0.41
INHIBIN A SERPL-MCNC: 60 PG/ML
INTEGRATED SCN PATIENT-IMP: NORMAL
PATHOLOGY STUDY: NORMAL
SPECIMEN DRAWN SERPL: NORMAL
U ESTRIOL MOM SERPL: 1.17
U ESTRIOL SERPL-MCNC: 2.64 NG/ML

## 2021-01-11 ENCOUNTER — HOSPITAL ENCOUNTER (OUTPATIENT)
Dept: RADIOLOGY | Facility: MEDICAL CENTER | Age: 31
End: 2021-01-11
Attending: OBSTETRICS & GYNECOLOGY
Payer: COMMERCIAL

## 2021-01-11 DIAGNOSIS — Z34.82 PRENATAL CARE, SUBSEQUENT PREGNANCY IN SECOND TRIMESTER: ICD-10-CM

## 2021-01-11 PROCEDURE — 76805 OB US >/= 14 WKS SNGL FETUS: CPT

## 2021-01-29 ENCOUNTER — ROUTINE PRENATAL (OUTPATIENT)
Dept: OBGYN | Facility: CLINIC | Age: 31
End: 2021-01-29
Payer: COMMERCIAL

## 2021-01-29 VITALS — WEIGHT: 205 LBS | SYSTOLIC BLOOD PRESSURE: 118 MMHG | BODY MASS INDEX: 32.11 KG/M2 | DIASTOLIC BLOOD PRESSURE: 74 MMHG

## 2021-01-29 DIAGNOSIS — Z34.83 ENCOUNTER FOR SUPERVISION OF OTHER NORMAL PREGNANCY IN THIRD TRIMESTER: ICD-10-CM

## 2021-01-29 DIAGNOSIS — O36.8390 FETAL ARRHYTHMIA AFFECTING PREGNANCY, ANTEPARTUM: ICD-10-CM

## 2021-01-29 PROCEDURE — 90040 PR PRENATAL FOLLOW UP: CPT | Performed by: OBSTETRICS & GYNECOLOGY

## 2021-01-29 NOTE — PROGRESS NOTES
ANNELIESE:  24w1d    Pt reports doing well.  Denies concerns.  Reports +FM.    /74   Wt 93 kg (205 lb)   LMP 2020   BMI 32.11 kg/m²   gen: AAO, NAD  FHTs: 140s, intermittently drops beat  FH: 25    A/P: 30 y.o.  @ 24w1d      Estimated Date of Delivery: 21 by 9wk US    PNL: Rh+/-, RI, wnl    Aneuploidy screening: quad screen neg  Anatomy US: wnl, ant placenta    Glucola/3rd tri labs: [ ]   Rhogam: n/a    Tdap: [ ]  Flu vacccine 2020    GBS [ ]       Previous  section-primary low transverse uterine  section with 2 layer closure  Patient requests repeat  section with permanent sterilization        3rd tri labs ordered today  Audible fetal arrhythmia: referral to MFM, discussed need for fetal ECHO, most commonly benign PACs but needs further evaluation.    Reviewed labor precautions (to call or come to hospital for ctx q5min, VB, LOF, decreased FM)    RTC 4wks    Joanne Salguero MD  [facility], Women's Health

## 2021-01-29 NOTE — PROGRESS NOTES
Pt here today for OB follow up @ 24w1d  Pt states denies VB and LOF  Reports +FM  Good # [phone]  Pharmacy Confirmed.

## 2021-02-13 ENCOUNTER — HOSPITAL ENCOUNTER (OUTPATIENT)
Dept: LAB | Facility: MEDICAL CENTER | Age: 31
End: 2021-02-13
Attending: OBSTETRICS & GYNECOLOGY
Payer: COMMERCIAL

## 2021-02-13 DIAGNOSIS — Z34.83 ENCOUNTER FOR SUPERVISION OF OTHER NORMAL PREGNANCY IN THIRD TRIMESTER: ICD-10-CM

## 2021-02-13 LAB
ERYTHROCYTE [DISTWIDTH] IN BLOOD BY AUTOMATED COUNT: 44.1 FL (ref 35.9–50)
GLUCOSE 1H P 50 G GLC PO SERPL-MCNC: 104 MG/DL (ref 70–139)
HCT VFR BLD AUTO: 35.3 % (ref 37–47)
HGB BLD-MCNC: 11.3 G/DL (ref 12–16)
MCH RBC QN AUTO: 32 PG (ref 27–33)
MCHC RBC AUTO-ENTMCNC: 32 G/DL (ref 33.6–35)
MCV RBC AUTO: 100 FL (ref 81.4–97.8)
PLATELET # BLD AUTO: 318 K/UL (ref 164–446)
PMV BLD AUTO: 10.3 FL (ref 9–12.9)
RBC # BLD AUTO: 3.53 M/UL (ref 4.2–5.4)
TREPONEMA PALLIDUM IGG+IGM AB [PRESENCE] IN SERUM OR PLASMA BY IMMUNOASSAY: NORMAL
WBC # BLD AUTO: 11.7 K/UL (ref 4.8–10.8)

## 2021-02-13 PROCEDURE — 86780 TREPONEMA PALLIDUM: CPT

## 2021-02-13 PROCEDURE — 85027 COMPLETE CBC AUTOMATED: CPT

## 2021-02-13 PROCEDURE — 36415 COLL VENOUS BLD VENIPUNCTURE: CPT

## 2021-02-13 PROCEDURE — 82950 GLUCOSE TEST: CPT

## 2021-02-23 ENCOUNTER — ROUTINE PRENATAL (OUTPATIENT)
Dept: OBGYN | Facility: CLINIC | Age: 31
End: 2021-02-23
Payer: COMMERCIAL

## 2021-02-23 VITALS — DIASTOLIC BLOOD PRESSURE: 84 MMHG | WEIGHT: 206 LBS | SYSTOLIC BLOOD PRESSURE: 127 MMHG | BODY MASS INDEX: 32.26 KG/M2

## 2021-02-23 DIAGNOSIS — Z34.82 ENCOUNTER FOR SUPERVISION OF OTHER NORMAL PREGNANCY IN SECOND TRIMESTER: ICD-10-CM

## 2021-02-23 PROCEDURE — 90715 TDAP VACCINE 7 YRS/> IM: CPT | Performed by: OBSTETRICS & GYNECOLOGY

## 2021-02-23 PROCEDURE — 90471 IMMUNIZATION ADMIN: CPT | Performed by: OBSTETRICS & GYNECOLOGY

## 2021-02-23 PROCEDURE — 90040 PR PRENATAL FOLLOW UP: CPT | Performed by: OBSTETRICS & GYNECOLOGY

## 2021-02-23 NOTE — PROGRESS NOTES
Pt here today for OB follow up @ 27w5d  Pt states denies VB and LOF  Reports +FM  Good # [phone]  Pharmacy Confirmed.  NAOMY sheet given   Tdap today

## 2021-02-23 NOTE — PROGRESS NOTES
ANNELIESE:  27w5d    Pt reports doing well.  No concerns today.  Reports +FM.  Reports everything normal at [facility]- plan for  ECHO only, no arrhythmia    /84   Wt 93.4 kg (206 lb)   LMP 2020   BMI 32.26 kg/m²   gen: AAO, NAD  FHTs: 150  FH: 28    A/P: 30 y.o.  @ 27w5d      Estimated Date of Delivery: 21 by 9wk US    PNL: Rh+/-, RI, wnl    Aneuploidy screening: quad screen neg  Anatomy US: wnl, ant placenta    Glucola/3rd tri labs: [wnl, glucola 104  Rhogam: n/a    Tdap: [ ]  Flu vacccine 2020    GBS [ ]       Previous  section-primary low transverse uterine  section with 2 layer closure  Patient requests repeat  section with permanent sterilization    : audible fetal arrhythmia --> referred to [facility]  [ ] [facility]      Records from [facility] requested         RTC  2wks    Joanne Salguero MD  [facility], Women's Health

## 2021-03-08 ENCOUNTER — ROUTINE PRENATAL (OUTPATIENT)
Dept: OBGYN | Facility: CLINIC | Age: 31
End: 2021-03-08
Payer: COMMERCIAL

## 2021-03-08 VITALS — DIASTOLIC BLOOD PRESSURE: 72 MMHG | SYSTOLIC BLOOD PRESSURE: 114 MMHG | BODY MASS INDEX: 32.26 KG/M2 | WEIGHT: 206 LBS

## 2021-03-08 DIAGNOSIS — O34.219 HISTORY OF CESAREAN DELIVERY, CURRENTLY PREGNANT: ICD-10-CM

## 2021-03-08 DIAGNOSIS — O09.90 SUPERVISION OF HIGH RISK PREGNANCY, ANTEPARTUM: ICD-10-CM

## 2021-03-08 PROCEDURE — 90040 PR PRENATAL FOLLOW UP: CPT | Performed by: OBSTETRICS & GYNECOLOGY

## 2021-03-08 NOTE — PROGRESS NOTES
S: Pt presents for routine OB follow up.  No contractions, vaginal bleeding, or leaking fluids. Good fetal movement.      O: LMP 2020   There were no vitals filed for this visit.  Vitals, fundal height , fetal position, and FHR reviewed on flowsheet    Patient Active Problem List   Diagnosis   • Postpartum care following  delivery   • History of  delivery, currently pregnant     Lab:  Recent Labs     20  1138 20  1140   ABOGROUP  --  A   RUBELLAIGG 16.70  --    HEPBSAG Non-Reactive  --    HEPCAB Non-Reactive  --        A/P:  30 y.o.  at 29w4d presents for routine obstetric follow-up.     #Prenatal care  - Continue prenatal vitamins.  Estimated Date of Delivery: 21 by 9wk US    PNL: Rh+/-, RI, wnl    Aneuploidy screening: quad screen neg  Anatomy US: wnl, ant placenta    Glucola/3rd tri labs: wnl, glucola 104  Rhogam: n/a    Tdap: 21  Flu vacccine 2020    GBS [ ]     Previous  section-primary low transverse uterine  section with 2 layer closure  Patient requests repeat  section with permanent sterilization    : audible fetal arrhythmia --> referred to MFM  Seen by Dr. Thurman : normal ECHO, only aneurysmal foramen ovale/no arrhythmia --> recommend  ECHO    - Follow-up: 2w

## 2021-03-08 NOTE — PROGRESS NOTES
Pt here today for OB follow up  Pt states no VB or LOF   Reports +FM  Good # [phone]   Pharmacy Confirmed.  Chaperone offered and declined.

## 2021-03-22 ENCOUNTER — ROUTINE PRENATAL (OUTPATIENT)
Dept: OBGYN | Facility: CLINIC | Age: 31
End: 2021-03-22
Payer: COMMERCIAL

## 2021-03-22 VITALS — DIASTOLIC BLOOD PRESSURE: 75 MMHG | SYSTOLIC BLOOD PRESSURE: 125 MMHG | WEIGHT: 210 LBS | BODY MASS INDEX: 32.89 KG/M2

## 2021-03-22 DIAGNOSIS — O34.219 HISTORY OF CESAREAN DELIVERY, CURRENTLY PREGNANT: ICD-10-CM

## 2021-03-22 PROCEDURE — 90040 PR PRENATAL FOLLOW UP: CPT | Performed by: OBSTETRICS & GYNECOLOGY

## 2021-03-22 RX ORDER — SULFAMETHOXAZOLE AND TRIMETHOPRIM 800; 160 MG/1; MG/1
1 TABLET ORAL 2 TIMES DAILY
Qty: 14 TABLET | Refills: 0 | Status: SHIPPED | OUTPATIENT
Start: 2021-03-22 | End: 2021-04-28

## 2021-03-22 NOTE — PROGRESS NOTES
Chief complaint: Return visit    S: Pt presents for routine OB follow up. Good fetal movement.  No contractions, vaginal bleeding, or leakage of fluid.    Questions answered.    O: /75   Wt 95.3 kg (210 lb)   LMP 2020   BMI 32.89 kg/m²   Patients' weight gain, fluid intake and exercise level discussed.  Vitals, fundal height , fetal position, and FHR reviewed on flowsheet    Lab:No results found for this or any previous visit (from the past 336 hour(s)).    A/P:  30 y.o.  at 31w4d presents for routine obstetric follow-up.  Size equals dates and/or scan    1.  Continue prenatal vitamins.  2.  Fetal kick counts.  3.  Exercise at least 30 minutes daily.  4.  Drink at least 2L of water daily  5.  Labor precautions educated.  6.  Follow-up in 2 weeks.  7.  GBS at 37 weeks    Plan for repeat low transverse  bilateral tubal ligation on May 13.  Request sent.  Patient will consider trial of labor after  if she goes into labor before her scheduled  date    All questions answered

## 2021-03-22 NOTE — PROGRESS NOTES
Pt here today for OB follow up  Pt states no Vb or LOF   Reports +FM  Good # [phone]    Pharmacy Confirmed.

## 2021-04-05 ENCOUNTER — ROUTINE PRENATAL (OUTPATIENT)
Dept: OBGYN | Facility: CLINIC | Age: 31
End: 2021-04-05
Payer: COMMERCIAL

## 2021-04-05 VITALS — SYSTOLIC BLOOD PRESSURE: 124 MMHG | BODY MASS INDEX: 33.05 KG/M2 | DIASTOLIC BLOOD PRESSURE: 69 MMHG | WEIGHT: 211 LBS

## 2021-04-05 DIAGNOSIS — O34.219 HISTORY OF CESAREAN DELIVERY, CURRENTLY PREGNANT: ICD-10-CM

## 2021-04-05 PROCEDURE — 90040 PR PRENATAL FOLLOW UP: CPT | Performed by: OBSTETRICS & GYNECOLOGY

## 2021-04-05 NOTE — PROGRESS NOTES
OB Follow Up--- High Risk  Prev C/S for Repeat / BS         30 y.o. is a 33w4d presents in prenatal follow up.    Subjective:no complaints  . Fetal Movement  positive    Problem List:has History of  delivery, currently pregnant on their problem list.     Objective:   /69   Wt 95.7 kg (211 lb)   LMP 2020   BMI 33.05 kg/m²     Abdomen:  S=D  Extremities:Normal        Lab:No results found for this or any previous visit (from the past 336 hour(s)).      Assessment:    33w4d   High Risk  Prev C/S for Repeat / BS       No substance abuse, STD risk, CRISELDA exposure    Plan:  2 weeks  Continue water intake  Ambulate nightly after 37 weeks  Continue Kick counts

## 2021-04-05 NOTE — PROGRESS NOTES
Pt here today for OB follow up @ 33w4d  Pt states denies VB and LOF  Reports +FM  Good # [phone]  Pharmacy Confirmed.  Chaperone offered and non needed.  Signed BTL at font desk   Labs up to date   Vaccines up to date

## 2021-04-19 ENCOUNTER — ROUTINE PRENATAL (OUTPATIENT)
Dept: OBGYN | Facility: CLINIC | Age: 31
End: 2021-04-19
Payer: COMMERCIAL

## 2021-04-19 VITALS — DIASTOLIC BLOOD PRESSURE: 77 MMHG | WEIGHT: 216 LBS | SYSTOLIC BLOOD PRESSURE: 121 MMHG | BODY MASS INDEX: 33.83 KG/M2

## 2021-04-19 DIAGNOSIS — O34.219 HISTORY OF CESAREAN DELIVERY, CURRENTLY PREGNANT: ICD-10-CM

## 2021-04-19 PROCEDURE — 90040 PR PRENATAL FOLLOW UP: CPT | Performed by: OBSTETRICS & GYNECOLOGY

## 2021-04-19 NOTE — PROGRESS NOTES
OB Follow Up--- High Risk  Prev C/s x1 , for Repeat          30 y.o. is a 35w4d presents in prenatal follow up.    Subjective:no complaints  . Fetal Movement  positive    Problem List:has History of  delivery, currently pregnant on their problem list.     Objective:   /77   Wt 98 kg (216 lb)   LMP 2020   BMI 33.83 kg/m²     Abdomen:  S=D  Extremities:Normal        Lab:No results found for this or any previous visit (from the past 336 hour(s)).      Assessment:    35w4d     High Risk  Prev C/s x1 , for Repeat   No pain, bleeding, unusual discharge or leeking    Plan:  1 week  C/S  2021  Continue water intake  Ambulate nightly after 37 weeks  Continue Kick counts

## 2021-04-19 NOTE — PROGRESS NOTES
Pt here today for OB follow up  Pt states no VB or LOF   Reports +FM  Good # [phone]   Pharmacy Confirmed.  Chaperone offered and provided.

## 2021-04-26 ENCOUNTER — TELEPHONE (OUTPATIENT)
Dept: OBGYN | Facility: CLINIC | Age: 31
End: 2021-04-26

## 2021-04-26 NOTE — TELEPHONE ENCOUNTER
Called and spoke with Pt, informed Pt that we are unable to give requested dates of her leave starting 4-13 to 9-1 2021. Explained to Pt that there is no medical indications from the providers that she can be out of work for the provided dates. Informed Pt that per medical leave that we can give her up to 8 wks as she is having CS on 5/13/2021. Pt states that she is only working as part-time and she is not qualified to have FMLA or Short term disability with her company. Pt verbalized understanding and has no further questions.

## 2021-04-27 ENCOUNTER — ROUTINE PRENATAL (OUTPATIENT)
Dept: OBGYN | Facility: CLINIC | Age: 31
End: 2021-04-27
Payer: COMMERCIAL

## 2021-04-27 ENCOUNTER — HOSPITAL ENCOUNTER (OUTPATIENT)
Facility: MEDICAL CENTER | Age: 31
End: 2021-04-27
Attending: OBSTETRICS & GYNECOLOGY
Payer: COMMERCIAL

## 2021-04-27 VITALS — WEIGHT: 213.8 LBS | SYSTOLIC BLOOD PRESSURE: 117 MMHG | DIASTOLIC BLOOD PRESSURE: 80 MMHG | BODY MASS INDEX: 33.49 KG/M2

## 2021-04-27 DIAGNOSIS — O34.219 HISTORY OF CESAREAN DELIVERY, CURRENTLY PREGNANT: ICD-10-CM

## 2021-04-27 PROCEDURE — 87150 DNA/RNA AMPLIFIED PROBE: CPT

## 2021-04-27 PROCEDURE — 87081 CULTURE SCREEN ONLY: CPT

## 2021-04-27 PROCEDURE — 90040 PR PRENATAL FOLLOW UP: CPT | Performed by: OBSTETRICS & GYNECOLOGY

## 2021-04-27 NOTE — PROGRESS NOTES
OB Follow Up--- High Risk  Prev C/S          30 y.o. is a 36w5d presents in prenatal follow up.    Subjective:no complaints  . Fetal Movement  positive    Problem List:has History of  delivery, currently pregnant on their problem list.     Objective:   /80   Wt 97 kg (213 lb 12.8 oz)   LMP 2020   BMI 33.49 kg/m²     Abdomen:  S=D  Extremities:Normal        Lab:No results found for this or any previous visit (from the past 336 hour(s)).      Assessment:    36w5d     Prev C/s for Repeat    No pain, bleeding, unusual discharge or leeking    Plan:  1 week  GBS today   Continue water intake  Ambulate nightly after 37 weeks  Continue Kick counts

## 2021-04-27 NOTE — PROGRESS NOTES
Pt is here for OB follow-up  No VB, UC or LOF.  Good phone #:[phone]  Pharmacy verified.  Pt states no other complaints for today.  GBS today  Patient is scheduled for C/S on 05/13/21 with Dr. Tucker at 9:30am, info given to Pt.

## 2021-04-28 ENCOUNTER — PRE-ADMISSION TESTING (OUTPATIENT)
Dept: ADMISSIONS | Facility: MEDICAL CENTER | Age: 31
End: 2021-04-28
Attending: OBSTETRICS & GYNECOLOGY
Payer: COMMERCIAL

## 2021-04-28 LAB — GP B STREP DNA SPEC QL NAA+PROBE: NEGATIVE

## 2021-05-03 ENCOUNTER — ROUTINE PRENATAL (OUTPATIENT)
Dept: OBGYN | Facility: CLINIC | Age: 31
End: 2021-05-03
Payer: COMMERCIAL

## 2021-05-03 VITALS — SYSTOLIC BLOOD PRESSURE: 117 MMHG | WEIGHT: 213 LBS | BODY MASS INDEX: 33.36 KG/M2 | DIASTOLIC BLOOD PRESSURE: 71 MMHG

## 2021-05-03 DIAGNOSIS — Z34.83 ENCOUNTER FOR SUPERVISION OF OTHER NORMAL PREGNANCY IN THIRD TRIMESTER: Primary | ICD-10-CM

## 2021-05-03 PROCEDURE — 90040 PR PRENATAL FOLLOW UP: CPT | Performed by: NURSE PRACTITIONER

## 2021-05-03 NOTE — PROGRESS NOTES
S) Pt is a 30 y.o.   at 37w4d  gestation. Routine prenatal care today. No concerns today.  Ready for .    Fetal movement Normal  Cramping no  VB no  LOF no   Denies dysuria. Generally feels well today. Good self-care activities identified. Denies headaches, swelling, visual changes, or epigastric pain .     O) See flow sheet for vital signs and fetal measurements.          Labs: WNL       PNL: WNL       GCT: 104       AFP: normal       GBS: negative       Pertinent ultrasound - 21 normal anatomy scan           A) IUP at 37w4d       S=D         Patient Active Problem List    Diagnosis Date Noted   • History of  delivery, currently pregnant 10/20/2020          SVE: deferred         TDAP: yes       FLU: yes        BTL: yes       : no       C/S Consent: yes       IOL or C/S scheduled: yes -        LAST PAP:          P) Labour precautions reviewed.  Fetal movements reviewed. General ed and anticipatory guidance. Nutrition/exercise/vitamin. Plans breast Plans pp contraception- BS  Continue PNV.   RTC 1 week or PRN.

## 2021-05-03 NOTE — PROGRESS NOTES
Pt here today for OB follow up  Negative GBS, pt informed   Reports +FM  WT: 213 lb  BP:  117/71  Preferred pharmacy verified with pt.  Pt states no complaints or concerns today  Good # [phone]

## 2021-05-10 ENCOUNTER — ROUTINE PRENATAL (OUTPATIENT)
Dept: OBGYN | Facility: CLINIC | Age: 31
End: 2021-05-10
Payer: COMMERCIAL

## 2021-05-10 ENCOUNTER — HOSPITAL ENCOUNTER (OUTPATIENT)
Dept: OBGYN | Facility: MEDICAL CENTER | Age: 31
End: 2021-05-10
Payer: COMMERCIAL

## 2021-05-10 VITALS — BODY MASS INDEX: 33.52 KG/M2 | SYSTOLIC BLOOD PRESSURE: 121 MMHG | DIASTOLIC BLOOD PRESSURE: 76 MMHG | WEIGHT: 214 LBS

## 2021-05-10 DIAGNOSIS — O09.93 HIGH-RISK PREGNANCY SUPERVISION, THIRD TRIMESTER: ICD-10-CM

## 2021-05-10 DIAGNOSIS — O34.219 HISTORY OF CESAREAN DELIVERY, CURRENTLY PREGNANT: ICD-10-CM

## 2021-05-10 DIAGNOSIS — O99.210 OBESITY IN PREGNANCY: ICD-10-CM

## 2021-05-10 DIAGNOSIS — Z30.09 CONSULTATION FOR FEMALE STERILIZATION: ICD-10-CM

## 2021-05-10 LAB
SARS-COV-2 RNA RESP QL NAA+PROBE: NOTDETECTED
SPECIMEN SOURCE: NORMAL

## 2021-05-10 PROCEDURE — 90040 PR PRENATAL FOLLOW UP: CPT | Performed by: OBSTETRICS & GYNECOLOGY

## 2021-05-10 PROCEDURE — U0003 INFECTIOUS AGENT DETECTION BY NUCLEIC ACID (DNA OR RNA); SEVERE ACUTE RESPIRATORY SYNDROME CORONAVIRUS 2 (SARS-COV-2) (CORONAVIRUS DISEASE [COVID-19]), AMPLIFIED PROBE TECHNIQUE, MAKING USE OF HIGH THROUGHPUT TECHNOLOGIES AS DESCRIBED BY CMS-2020-01-R: HCPCS

## 2021-05-10 PROCEDURE — U0005 INFEC AGEN DETEC AMPLI PROBE: HCPCS

## 2021-05-10 NOTE — PROGRESS NOTES
S: Pt presents for routine OB follow up and preop exam.  Patient reports good fetal movement.  No contractions, vaginal bleeding, or leakage of fluid.  Denies headaches or scotoma.  Denies any nausea vomiting or dysuria.  Patient has history of previous  and is scheduled for repeat  with sterilization on Thursday.  Questions answered.    O: /76   Wt 97.1 kg (214 lb)   LMP 2020   BMI 33.52 kg/m²   Patients' weight gain, fluid intake and exercise level discussed.  Vitals, fundal height , fetal position, and FHR reviewed on flowsheet    Lab:  Recent Results (from the past 336 hour(s))   GRP B STREP, BY PCR (SY BROTH)    Collection Time: 21  9:15 AM    Specimen: Genital   Result Value Ref Range    Strep Gp B DNA PCR Negative Negative     Discussion:  Discussed with the patient indications for  delivery. The patient voiced understanding of indications for  section at this time.    Discussed with the patient the risks of  delivery. The risks include infection, bleeding, scarring, damage to other organs in the area of operation. Specifically organs that can be damaged are bowel, bladder, ureters. I also discussed with the patient the risk of wound infection and wound breakdown. We discussed that these risks are greater in people that have diabetes or obesity. I also discussed the risk of emergency blood transfusion during procedure as well as emergency hysterectomy during procedure.    Discussed today with a risks of tubal ligation. I discussed that the tubal is considered a permanent procedure and the patient will no longer be able to bear children following a tubal. I discussed other forms of birth control which include pills, barrier methods, Depo-Provera, IUD or male sterilization. We discussed  medications or procedures are nonpermanent nor are they surgical. I also discussed the risk of tubal failure with the patient which is one in 200 procedures. We  discussed that should the tubal fail there is a risk for ectopic pregnancy which may require surgical intervention.  Different methods of sterilization reviewed and plan is for bilateral salpingectomy    Patient had the opportunity to ask questions regarding procedures. All questions answered to the patient's satisfaction.  Preoperative instructions were reviewed  Proceed with  delivery and bilateral salpingectomy    A/P:  31 y.o.  at 38w4d presents for routine obstetric follow-up and preoperative evaluation.  Patient is doing well.  Size equals dates.  Plan is for repeat  with bilateral salpingectomy on Thursday  Encounter Diagnoses   Name Primary?   • High-risk pregnancy supervision, third trimester    • History of  delivery, currently pregnant    • Obesity in pregnancy    • Consultation for female sterilization        1.  Continue prenatal vitamins.  2.  Fetal kick counts daily discussed.  3.  Exercise at least 30 minutes daily.  4.  Drink at least 2L of water daily  5.  Pregnancy and labor precautions educated.  6.  Follow-up in 1 to 2-week postop  7.  Patient counseled on  and sterilization.  Preoperative instructions discussed.  Postoperative care and management reviewed

## 2021-05-10 NOTE — PROGRESS NOTES
OB follow up   + fetal movement.  No VB, LOF or UC's.  Phone # [phone]  Preferred pharmacy confirmed  GBS negative   Pt scheduled for c/s on 5/13/21 at 0930, pt aware

## 2021-05-13 ENCOUNTER — ANESTHESIA EVENT (OUTPATIENT)
Dept: OBGYN | Facility: MEDICAL CENTER | Age: 31
End: 2021-05-13
Payer: COMMERCIAL

## 2021-05-13 ENCOUNTER — HOSPITAL ENCOUNTER (INPATIENT)
Facility: MEDICAL CENTER | Age: 31
LOS: 2 days | End: 2021-05-15
Attending: OBSTETRICS & GYNECOLOGY | Admitting: OBSTETRICS & GYNECOLOGY
Payer: COMMERCIAL

## 2021-05-13 ENCOUNTER — ANESTHESIA (OUTPATIENT)
Dept: OBGYN | Facility: MEDICAL CENTER | Age: 31
End: 2021-05-13
Payer: COMMERCIAL

## 2021-05-13 LAB
BASOPHILS # BLD AUTO: 0.3 % (ref 0–1.8)
BASOPHILS # BLD: 0.04 K/UL (ref 0–0.12)
EOSINOPHIL # BLD AUTO: 0.13 K/UL (ref 0–0.51)
EOSINOPHIL NFR BLD: 1.1 % (ref 0–6.9)
ERYTHROCYTE [DISTWIDTH] IN BLOOD BY AUTOMATED COUNT: 44.2 FL (ref 35.9–50)
ERYTHROCYTE [DISTWIDTH] IN BLOOD BY AUTOMATED COUNT: 45 FL (ref 35.9–50)
HCT VFR BLD AUTO: 29.5 % (ref 37–47)
HCT VFR BLD AUTO: 33.7 % (ref 37–47)
HGB BLD-MCNC: 10.8 G/DL (ref 12–16)
HGB BLD-MCNC: 9.6 G/DL (ref 12–16)
HOLDING TUBE BB 8507: NORMAL
IMM GRANULOCYTES # BLD AUTO: 0.1 K/UL (ref 0–0.11)
IMM GRANULOCYTES NFR BLD AUTO: 0.9 % (ref 0–0.9)
LYMPHOCYTES # BLD AUTO: 2.04 K/UL (ref 1–4.8)
LYMPHOCYTES NFR BLD: 17.4 % (ref 22–41)
MCH RBC QN AUTO: 30.8 PG (ref 27–33)
MCH RBC QN AUTO: 31.5 PG (ref 27–33)
MCHC RBC AUTO-ENTMCNC: 32 G/DL (ref 33.6–35)
MCHC RBC AUTO-ENTMCNC: 32.5 G/DL (ref 33.6–35)
MCV RBC AUTO: 96 FL (ref 81.4–97.8)
MCV RBC AUTO: 96.7 FL (ref 81.4–97.8)
MONOCYTES # BLD AUTO: 0.85 K/UL (ref 0–0.85)
MONOCYTES NFR BLD AUTO: 7.2 % (ref 0–13.4)
NEUTROPHILS # BLD AUTO: 8.58 K/UL (ref 2–7.15)
NEUTROPHILS NFR BLD: 73.1 % (ref 44–72)
NRBC # BLD AUTO: 0 K/UL
NRBC BLD-RTO: 0 /100 WBC
PATHOLOGY CONSULT NOTE: NORMAL
PLATELET # BLD AUTO: 268 K/UL (ref 164–446)
PLATELET # BLD AUTO: 269 K/UL (ref 164–446)
PMV BLD AUTO: 10.2 FL (ref 9–12.9)
PMV BLD AUTO: 10.5 FL (ref 9–12.9)
RBC # BLD AUTO: 3.05 M/UL (ref 4.2–5.4)
RBC # BLD AUTO: 3.51 M/UL (ref 4.2–5.4)
WBC # BLD AUTO: 11.7 K/UL (ref 4.8–10.8)
WBC # BLD AUTO: 19.6 K/UL (ref 4.8–10.8)

## 2021-05-13 PROCEDURE — 160002 HCHG RECOVERY MINUTES (STAT): Performed by: OBSTETRICS & GYNECOLOGY

## 2021-05-13 PROCEDURE — 700101 HCHG RX REV CODE 250: Performed by: ANESTHESIOLOGY

## 2021-05-13 PROCEDURE — 700105 HCHG RX REV CODE 258: Performed by: ANESTHESIOLOGY

## 2021-05-13 PROCEDURE — 160041 HCHG SURGERY MINUTES - EA ADDL 1 MIN LEVEL 4: Performed by: OBSTETRICS & GYNECOLOGY

## 2021-05-13 PROCEDURE — 700111 HCHG RX REV CODE 636 W/ 250 OVERRIDE (IP): Performed by: OBSTETRICS & GYNECOLOGY

## 2021-05-13 PROCEDURE — 160035 HCHG PACU - 1ST 60 MINS PHASE I: Performed by: OBSTETRICS & GYNECOLOGY

## 2021-05-13 PROCEDURE — 85027 COMPLETE CBC AUTOMATED: CPT

## 2021-05-13 PROCEDURE — 700102 HCHG RX REV CODE 250 W/ 637 OVERRIDE(OP): Performed by: OBSTETRICS & GYNECOLOGY

## 2021-05-13 PROCEDURE — 700102 HCHG RX REV CODE 250 W/ 637 OVERRIDE(OP): Performed by: ANESTHESIOLOGY

## 2021-05-13 PROCEDURE — 160029 HCHG SURGERY MINUTES - 1ST 30 MINS LEVEL 4: Performed by: OBSTETRICS & GYNECOLOGY

## 2021-05-13 PROCEDURE — 160009 HCHG ANES TIME/MIN: Performed by: OBSTETRICS & GYNECOLOGY

## 2021-05-13 PROCEDURE — 770002 HCHG ROOM/CARE - OB PRIVATE (112)

## 2021-05-13 PROCEDURE — A9270 NON-COVERED ITEM OR SERVICE: HCPCS | Performed by: ANESTHESIOLOGY

## 2021-05-13 PROCEDURE — 0UB70ZZ EXCISION OF BILATERAL FALLOPIAN TUBES, OPEN APPROACH: ICD-10-PCS | Performed by: OBSTETRICS & GYNECOLOGY

## 2021-05-13 PROCEDURE — 160048 HCHG OR STATISTICAL LEVEL 1-5: Performed by: OBSTETRICS & GYNECOLOGY

## 2021-05-13 PROCEDURE — 700111 HCHG RX REV CODE 636 W/ 250 OVERRIDE (IP): Performed by: ANESTHESIOLOGY

## 2021-05-13 PROCEDURE — 59510 CESAREAN DELIVERY: CPT | Performed by: OBSTETRICS & GYNECOLOGY

## 2021-05-13 PROCEDURE — 58611 LIGATE OVIDUCT(S) ADD-ON: CPT | Performed by: OBSTETRICS & GYNECOLOGY

## 2021-05-13 PROCEDURE — 36415 COLL VENOUS BLD VENIPUNCTURE: CPT

## 2021-05-13 PROCEDURE — 85025 COMPLETE CBC W/AUTO DIFF WBC: CPT

## 2021-05-13 PROCEDURE — 58611 LIGATE OVIDUCT(S) ADD-ON: CPT | Mod: 80

## 2021-05-13 PROCEDURE — 59514 CESAREAN DELIVERY ONLY: CPT | Mod: 80

## 2021-05-13 PROCEDURE — A9270 NON-COVERED ITEM OR SERVICE: HCPCS | Performed by: OBSTETRICS & GYNECOLOGY

## 2021-05-13 PROCEDURE — 88302 TISSUE EXAM BY PATHOLOGIST: CPT

## 2021-05-13 RX ORDER — CITRIC ACID/SODIUM CITRATE 334-500MG
30 SOLUTION, ORAL ORAL ONCE
Status: COMPLETED | OUTPATIENT
Start: 2021-05-13 | End: 2021-05-13

## 2021-05-13 RX ORDER — LABETALOL HYDROCHLORIDE 5 MG/ML
5 INJECTION, SOLUTION INTRAVENOUS
Status: DISCONTINUED | OUTPATIENT
Start: 2021-05-13 | End: 2021-05-13 | Stop reason: HOSPADM

## 2021-05-13 RX ORDER — HYDROMORPHONE HYDROCHLORIDE 1 MG/ML
0.4 INJECTION, SOLUTION INTRAMUSCULAR; INTRAVENOUS; SUBCUTANEOUS
Status: ACTIVE | OUTPATIENT
Start: 2021-05-13 | End: 2021-05-14

## 2021-05-13 RX ORDER — ONDANSETRON 2 MG/ML
4 INJECTION INTRAMUSCULAR; INTRAVENOUS EVERY 6 HOURS PRN
Status: ACTIVE | OUTPATIENT
Start: 2021-05-13 | End: 2021-05-14

## 2021-05-13 RX ORDER — HALOPERIDOL 5 MG/ML
1 INJECTION INTRAMUSCULAR
Status: DISCONTINUED | OUTPATIENT
Start: 2021-05-13 | End: 2021-05-13 | Stop reason: HOSPADM

## 2021-05-13 RX ORDER — MISOPROSTOL 200 UG/1
600 TABLET ORAL
Status: DISCONTINUED | OUTPATIENT
Start: 2021-05-13 | End: 2021-05-15 | Stop reason: HOSPADM

## 2021-05-13 RX ORDER — HYDROMORPHONE HYDROCHLORIDE 1 MG/ML
0.4 INJECTION, SOLUTION INTRAMUSCULAR; INTRAVENOUS; SUBCUTANEOUS
Status: DISCONTINUED | OUTPATIENT
Start: 2021-05-13 | End: 2021-05-13 | Stop reason: HOSPADM

## 2021-05-13 RX ORDER — OXYCODONE HCL 5 MG/5 ML
10 SOLUTION, ORAL ORAL
Status: DISCONTINUED | OUTPATIENT
Start: 2021-05-13 | End: 2021-05-13 | Stop reason: HOSPADM

## 2021-05-13 RX ORDER — ONDANSETRON 2 MG/ML
4 INJECTION INTRAMUSCULAR; INTRAVENOUS
Status: DISCONTINUED | OUTPATIENT
Start: 2021-05-13 | End: 2021-05-13 | Stop reason: HOSPADM

## 2021-05-13 RX ORDER — HYDROMORPHONE HYDROCHLORIDE 1 MG/ML
0.2 INJECTION, SOLUTION INTRAMUSCULAR; INTRAVENOUS; SUBCUTANEOUS
Status: DISCONTINUED | OUTPATIENT
Start: 2021-05-13 | End: 2021-05-13 | Stop reason: HOSPADM

## 2021-05-13 RX ORDER — VITAMIN A ACETATE, BETA CAROTENE, ASCORBIC ACID, CHOLECALCIFEROL, .ALPHA.-TOCOPHEROL ACETATE, DL-, THIAMINE MONONITRATE, RIBOFLAVIN, NIACINAMIDE, PYRIDOXINE HYDROCHLORIDE, FOLIC ACID, CYANOCOBALAMIN, CALCIUM CARBONATE, FERROUS FUMARATE, ZINC OXIDE, CUPRIC OXIDE 3080; 12; 120; 400; 1; 1.84; 3; 20; 22; 920; 25; 200; 27; 10; 2 [IU]/1; UG/1; MG/1; [IU]/1; MG/1; MG/1; MG/1; MG/1; MG/1; [IU]/1; MG/1; MG/1; MG/1; MG/1; MG/1
1 TABLET, FILM COATED ORAL
Status: DISCONTINUED | OUTPATIENT
Start: 2021-05-13 | End: 2021-05-15 | Stop reason: HOSPADM

## 2021-05-13 RX ORDER — SODIUM CHLORIDE, SODIUM LACTATE, POTASSIUM CHLORIDE, CALCIUM CHLORIDE 600; 310; 30; 20 MG/100ML; MG/100ML; MG/100ML; MG/100ML
INJECTION, SOLUTION INTRAVENOUS PRN
Status: DISCONTINUED | OUTPATIENT
Start: 2021-05-13 | End: 2021-05-15 | Stop reason: HOSPADM

## 2021-05-13 RX ORDER — MEPERIDINE HYDROCHLORIDE 25 MG/ML
12.5 INJECTION INTRAMUSCULAR; INTRAVENOUS; SUBCUTANEOUS
Status: DISCONTINUED | OUTPATIENT
Start: 2021-05-13 | End: 2021-05-13 | Stop reason: HOSPADM

## 2021-05-13 RX ORDER — KETOROLAC TROMETHAMINE 30 MG/ML
INJECTION, SOLUTION INTRAMUSCULAR; INTRAVENOUS PRN
Status: DISCONTINUED | OUTPATIENT
Start: 2021-05-13 | End: 2021-05-13 | Stop reason: SURG

## 2021-05-13 RX ORDER — DIPHENHYDRAMINE HYDROCHLORIDE 50 MG/ML
12.5 INJECTION INTRAMUSCULAR; INTRAVENOUS EVERY 6 HOURS PRN
Status: DISPENSED | OUTPATIENT
Start: 2021-05-13 | End: 2021-05-14

## 2021-05-13 RX ORDER — ACETAMINOPHEN 500 MG
1000 TABLET ORAL EVERY 6 HOURS
Status: COMPLETED | OUTPATIENT
Start: 2021-05-13 | End: 2021-05-14

## 2021-05-13 RX ORDER — METHYLERGONOVINE MALEATE 0.2 MG/ML
0.2 INJECTION INTRAVENOUS
Status: DISCONTINUED | OUTPATIENT
Start: 2021-05-13 | End: 2021-05-15 | Stop reason: HOSPADM

## 2021-05-13 RX ORDER — OXYCODONE HCL 5 MG/5 ML
5 SOLUTION, ORAL ORAL
Status: DISCONTINUED | OUTPATIENT
Start: 2021-05-13 | End: 2021-05-13 | Stop reason: HOSPADM

## 2021-05-13 RX ORDER — HYDROMORPHONE HYDROCHLORIDE 1 MG/ML
0.2 INJECTION, SOLUTION INTRAMUSCULAR; INTRAVENOUS; SUBCUTANEOUS
Status: ACTIVE | OUTPATIENT
Start: 2021-05-13 | End: 2021-05-14

## 2021-05-13 RX ORDER — MORPHINE SULFATE 0.5 MG/ML
INJECTION, SOLUTION EPIDURAL; INTRATHECAL; INTRAVENOUS
Status: COMPLETED | OUTPATIENT
Start: 2021-05-13 | End: 2021-05-13

## 2021-05-13 RX ORDER — MIDAZOLAM HYDROCHLORIDE 1 MG/ML
1 INJECTION INTRAMUSCULAR; INTRAVENOUS
Status: DISCONTINUED | OUTPATIENT
Start: 2021-05-13 | End: 2021-05-13 | Stop reason: HOSPADM

## 2021-05-13 RX ORDER — DIPHENHYDRAMINE HYDROCHLORIDE 50 MG/ML
12.5 INJECTION INTRAMUSCULAR; INTRAVENOUS
Status: DISCONTINUED | OUTPATIENT
Start: 2021-05-13 | End: 2021-05-13 | Stop reason: HOSPADM

## 2021-05-13 RX ORDER — CEFAZOLIN SODIUM 1 G/3ML
INJECTION, POWDER, FOR SOLUTION INTRAMUSCULAR; INTRAVENOUS PRN
Status: DISCONTINUED | OUTPATIENT
Start: 2021-05-13 | End: 2021-05-13 | Stop reason: SURG

## 2021-05-13 RX ORDER — SIMETHICONE 80 MG
80 TABLET,CHEWABLE ORAL 4 TIMES DAILY PRN
Status: DISCONTINUED | OUTPATIENT
Start: 2021-05-13 | End: 2021-05-15 | Stop reason: HOSPADM

## 2021-05-13 RX ORDER — METOCLOPRAMIDE HYDROCHLORIDE 5 MG/ML
10 INJECTION INTRAMUSCULAR; INTRAVENOUS ONCE
Status: COMPLETED | OUTPATIENT
Start: 2021-05-13 | End: 2021-05-13

## 2021-05-13 RX ORDER — SODIUM CHLORIDE, SODIUM GLUCONATE, SODIUM ACETATE, POTASSIUM CHLORIDE AND MAGNESIUM CHLORIDE 526; 502; 368; 37; 30 MG/100ML; MG/100ML; MG/100ML; MG/100ML; MG/100ML
INJECTION, SOLUTION INTRAVENOUS
Status: DISCONTINUED | OUTPATIENT
Start: 2021-05-13 | End: 2021-05-13 | Stop reason: SURG

## 2021-05-13 RX ORDER — OXYCODONE HYDROCHLORIDE 5 MG/1
5 TABLET ORAL EVERY 4 HOURS PRN
Status: ACTIVE | OUTPATIENT
Start: 2021-05-13 | End: 2021-05-14

## 2021-05-13 RX ORDER — KETOROLAC TROMETHAMINE 30 MG/ML
30 INJECTION, SOLUTION INTRAMUSCULAR; INTRAVENOUS EVERY 6 HOURS
Status: DISCONTINUED | OUTPATIENT
Start: 2021-05-13 | End: 2021-05-14

## 2021-05-13 RX ORDER — OXYCODONE HYDROCHLORIDE 5 MG/1
10 TABLET ORAL EVERY 4 HOURS PRN
Status: DISCONTINUED | OUTPATIENT
Start: 2021-05-13 | End: 2021-05-14

## 2021-05-13 RX ORDER — DOCUSATE SODIUM 100 MG/1
100 CAPSULE, LIQUID FILLED ORAL 2 TIMES DAILY PRN
Status: DISCONTINUED | OUTPATIENT
Start: 2021-05-13 | End: 2021-05-15 | Stop reason: HOSPADM

## 2021-05-13 RX ORDER — KETOROLAC TROMETHAMINE 30 MG/ML
30 INJECTION, SOLUTION INTRAMUSCULAR; INTRAVENOUS EVERY 6 HOURS
Status: DISCONTINUED | OUTPATIENT
Start: 2021-05-13 | End: 2021-05-13

## 2021-05-13 RX ORDER — SODIUM CHLORIDE, SODIUM LACTATE, POTASSIUM CHLORIDE, CALCIUM CHLORIDE 600; 310; 30; 20 MG/100ML; MG/100ML; MG/100ML; MG/100ML
INJECTION, SOLUTION INTRAVENOUS CONTINUOUS
Status: DISCONTINUED | OUTPATIENT
Start: 2021-05-13 | End: 2021-05-15 | Stop reason: HOSPADM

## 2021-05-13 RX ORDER — ACETAMINOPHEN 500 MG
1000 TABLET ORAL EVERY 4 HOURS PRN
Status: DISCONTINUED | OUTPATIENT
Start: 2021-05-13 | End: 2021-05-15 | Stop reason: HOSPADM

## 2021-05-13 RX ORDER — SODIUM CHLORIDE, SODIUM GLUCONATE, SODIUM ACETATE, POTASSIUM CHLORIDE AND MAGNESIUM CHLORIDE 526; 502; 368; 37; 30 MG/100ML; MG/100ML; MG/100ML; MG/100ML; MG/100ML
1500 INJECTION, SOLUTION INTRAVENOUS ONCE
Status: COMPLETED | OUTPATIENT
Start: 2021-05-13 | End: 2021-05-13

## 2021-05-13 RX ORDER — HYDROMORPHONE HYDROCHLORIDE 1 MG/ML
0.1 INJECTION, SOLUTION INTRAMUSCULAR; INTRAVENOUS; SUBCUTANEOUS
Status: DISCONTINUED | OUTPATIENT
Start: 2021-05-13 | End: 2021-05-13 | Stop reason: HOSPADM

## 2021-05-13 RX ORDER — ONDANSETRON 2 MG/ML
INJECTION INTRAMUSCULAR; INTRAVENOUS PRN
Status: DISCONTINUED | OUTPATIENT
Start: 2021-05-13 | End: 2021-05-13 | Stop reason: SURG

## 2021-05-13 RX ORDER — DEXAMETHASONE SODIUM PHOSPHATE 4 MG/ML
INJECTION, SOLUTION INTRA-ARTICULAR; INTRALESIONAL; INTRAMUSCULAR; INTRAVENOUS; SOFT TISSUE PRN
Status: DISCONTINUED | OUTPATIENT
Start: 2021-05-13 | End: 2021-05-13 | Stop reason: SURG

## 2021-05-13 RX ORDER — BUPIVACAINE HYDROCHLORIDE 7.5 MG/ML
INJECTION, SOLUTION INTRASPINAL
Status: COMPLETED | OUTPATIENT
Start: 2021-05-13 | End: 2021-05-13

## 2021-05-13 RX ADMIN — ONDANSETRON 4 MG: 2 INJECTION INTRAMUSCULAR; INTRAVENOUS at 12:08

## 2021-05-13 RX ADMIN — METOCLOPRAMIDE 10 MG: 5 INJECTION, SOLUTION INTRAMUSCULAR; INTRAVENOUS at 09:08

## 2021-05-13 RX ADMIN — SODIUM CHLORIDE, SODIUM GLUCONATE, SODIUM ACETATE, POTASSIUM CHLORIDE AND MAGNESIUM CHLORIDE 1500 ML: 526; 502; 368; 37; 30 INJECTION, SOLUTION INTRAVENOUS at 08:15

## 2021-05-13 RX ADMIN — ACETAMINOPHEN 1000 MG: 500 TABLET ORAL at 21:19

## 2021-05-13 RX ADMIN — CEFAZOLIN 2 G: 330 INJECTION, POWDER, FOR SOLUTION INTRAMUSCULAR; INTRAVENOUS at 11:53

## 2021-05-13 RX ADMIN — BUPIVACAINE HYDROCHLORIDE IN DEXTROSE 1.7 ML: 7.5 INJECTION, SOLUTION SUBARACHNOID at 11:39

## 2021-05-13 RX ADMIN — KETOROLAC TROMETHAMINE 30 MG: 30 INJECTION, SOLUTION INTRAMUSCULAR; INTRAVENOUS at 18:02

## 2021-05-13 RX ADMIN — MORPHINE SULFATE 4.85 MCG: 0.5 INJECTION, SOLUTION EPIDURAL; INTRATHECAL; INTRAVENOUS at 12:35

## 2021-05-13 RX ADMIN — MORPHINE SULFATE 150 MCG: 0.5 INJECTION, SOLUTION EPIDURAL; INTRATHECAL; INTRAVENOUS at 11:39

## 2021-05-13 RX ADMIN — DOCUSATE SODIUM 100 MG: 100 CAPSULE, LIQUID FILLED ORAL at 16:05

## 2021-05-13 RX ADMIN — FAMOTIDINE 20 MG: 10 INJECTION INTRAVENOUS at 09:05

## 2021-05-13 RX ADMIN — PHENYLEPHRINE HYDROCHLORIDE 50 MCG/MIN: 10 INJECTION INTRAVENOUS at 11:36

## 2021-05-13 RX ADMIN — OXYTOCIN 125 ML/HR: 10 INJECTION, SOLUTION INTRAMUSCULAR; INTRAVENOUS at 16:47

## 2021-05-13 RX ADMIN — SODIUM CITRATE AND CITRIC ACID MONOHYDRATE 30 ML: 500; 334 SOLUTION ORAL at 09:09

## 2021-05-13 RX ADMIN — DEXAMETHASONE SODIUM PHOSPHATE 4 MG: 4 INJECTION, SOLUTION INTRA-ARTICULAR; INTRALESIONAL; INTRAMUSCULAR; INTRAVENOUS; SOFT TISSUE at 12:08

## 2021-05-13 RX ADMIN — KETOROLAC TROMETHAMINE 30 MG: 30 INJECTION, SOLUTION INTRAMUSCULAR; INTRAVENOUS at 23:54

## 2021-05-13 RX ADMIN — ACETAMINOPHEN 1000 MG: 500 TABLET ORAL at 16:04

## 2021-05-13 RX ADMIN — OXYTOCIN 1000 ML: 10 INJECTION, SOLUTION INTRAMUSCULAR; INTRAVENOUS at 12:08

## 2021-05-13 RX ADMIN — SODIUM CHLORIDE, SODIUM GLUCONATE, SODIUM ACETATE, POTASSIUM CHLORIDE AND MAGNESIUM CHLORIDE: 526; 502; 368; 37; 30 INJECTION, SOLUTION INTRAVENOUS at 11:35

## 2021-05-13 RX ADMIN — KETOROLAC TROMETHAMINE 30 MG: 30 INJECTION, SOLUTION INTRAMUSCULAR at 12:21

## 2021-05-13 ASSESSMENT — PATIENT HEALTH QUESTIONNAIRE - PHQ9
SUM OF ALL RESPONSES TO PHQ9 QUESTIONS 1 AND 2: 0
1. LITTLE INTEREST OR PLEASURE IN DOING THINGS: NOT AT ALL
2. FEELING DOWN, DEPRESSED, IRRITABLE, OR HOPELESS: NOT AT ALL

## 2021-05-13 ASSESSMENT — LIFESTYLE VARIABLES
HOW MANY TIMES IN THE PAST YEAR HAVE YOU HAD 5 OR MORE DRINKS IN A DAY: 0
ALCOHOL_USE: NO
TOTAL SCORE: 0
TOTAL SCORE: 0
AVERAGE NUMBER OF DAYS PER WEEK YOU HAVE A DRINK CONTAINING ALCOHOL: 0
ON A TYPICAL DAY WHEN YOU DRINK ALCOHOL HOW MANY DRINKS DO YOU HAVE: 0
CONSUMPTION TOTAL: NEGATIVE
HAVE PEOPLE ANNOYED YOU BY CRITICIZING YOUR DRINKING: NO
HAVE YOU EVER FELT YOU SHOULD CUT DOWN ON YOUR DRINKING: NO
EVER FELT BAD OR GUILTY ABOUT YOUR DRINKING: NO
EVER_SMOKED: NEVER
TOTAL SCORE: 0
EVER HAD A DRINK FIRST THING IN THE MORNING TO STEADY YOUR NERVES TO GET RID OF A HANGOVER: NO

## 2021-05-13 ASSESSMENT — PAIN DESCRIPTION - PAIN TYPE
TYPE: SURGICAL PAIN

## 2021-05-13 ASSESSMENT — PAIN SCALES - GENERAL: PAIN_LEVEL: 0

## 2021-05-13 NOTE — ANESTHESIA POSTPROCEDURE EVALUATION
Patient: Sierra Burroughs    Procedure Summary     Date: 21 Room / Location: [facility] OR 01 / SURGERY LABOR AND DELIVERY    Anesthesia Start: 1135 Anesthesia Stop: 1248    Procedure:  SECTION, REPEAT, WITH SALPINGECTOMY (Bilateral ) Diagnosis:        delivery delivered      (PREVIOUS  SECTION, PERMANENT STERILIZATION)      (39 WEEKS)    Surgeons: Jaden Tucker M.D. Responsible Provider: Сергей Junior M.D.    Anesthesia Type: neuraxial block ASA Status: 2          Final Anesthesia Type: neuraxial block  Last vitals  BP   Blood Pressure: 118/75    Temp   36.9 °C (98.4 °F)    Pulse   83   Resp   18    SpO2          Anesthesia Post Evaluation    Patient location during evaluation: PACU  Patient participation: complete - patient participated  Level of consciousness: awake and alert  Pain score: 0    Airway patency: patent  Anesthetic complications: no  Cardiovascular status: hemodynamically stable  Respiratory status: acceptable  Hydration status: euvolemic    PONV: none          No complications documented.

## 2021-05-13 NOTE — ANESTHESIA PROCEDURE NOTES
Spinal Block    Date/Time: 5/13/2021 11:39 AM  Performed by: Сергей Junior M.D.  Authorized by: Сергей Junior M.D.     Start Time:  5/13/2021 11:39 AM  End Time:  5/13/2021 11:46 AM  Reason for Block: primary anesthetic    patient identified, IV checked, site marked, risks and benefits discussed, surgical consent, monitors and equipment checked, pre-op evaluation and timeout performed    Patient Position:  Sitting  Prep: ChloraPrep, patient draped and sterile technique    Monitoring:  Blood pressure, continuous pulse oximetry and heart rate  Approach:  Midline  Location:  L3-4  Injection Technique:  Single-shot  Skin infiltration:  Lidocaine  Strength:  1%  Dose:  5ml  Needle Type:  Pencan  Needle Gauge:  25 G  CSF flowing pre/post injection:  Yes  Sensory Level:  T4

## 2021-05-13 NOTE — H&P
Obstetrics & Gynecology History & Physical Note    Date of Service  2021    Chief Complaint  Patient is here for scheduled repeat  and sterilization    History of Presenting Illness  Sierra Burroughs is a 31 y.o.  at 39w0d 2021, by Ultrasound. Patient's last menstrual period was 2020. She is being admitted for scheduled elective repeat  and sterilization.  Patient is without complaints.. Reports normal fetal movements without any bleeding or leaking.  Denies headaches or scotoma.  Reports normal bowel and bladder functions.  Reports no fevers or respiratory symptoms    [facility] and is complicated by:      Patient Active Problem List    Diagnosis Date Noted   • High-risk pregnancy supervision, third trimester 05/10/2021   • Obesity in pregnancy 05/10/2021   • History of  delivery, currently pregnant 10/20/2020       Obstetric History  OB History    Para Term  AB Living   4 3 3     3   SAB TAB Ectopic Molar Multiple Live Births             3      # Outcome Date GA Lbr Trevor/2nd Weight Sex Delivery Anes PTL Lv   4 Current            3 Term 17 39w2d  3.42 kg (7 lb 8.6 oz) F CS-LTranv Spinal N JESSE      Birth Comments: Primary C/S, breech   2 Term 11 39w0d  3.6 kg (7 lb 15 oz) F Vag-Spont EPI N JESSE      Birth Comments: Pt states no complications.    1 Term 10/18/08 39w0d  3.657 kg (8 lb 1 oz) M Vag-Spont EPI N JESSE      Birth Comments: Pt states no complications.        Gynecologic History  negative    Review of Systems  ROS all organ system reviewed and are currently negative    Medical History   has no past medical history of Addisons disease (HCC), Adrenal disorder (HCC), Allergy, Anemia, Anxiety, Arrhythmia, Arthritis, ASTHMA, Asthma, Blood transfusion without reported diagnosis, CAD (coronary artery disease), Cancer ([facility]), Cataract, CHF (congestive heart failure) ([facility]), Clotting disorder ([facility]), Congestive heart failure  "(HCC), COPD, COPD (chronic obstructive pulmonary disease) (HCC), Cushings syndrome (HCC), Depression, Diabetes, Diabetes (HCC), Diabetic neuropathy (HCC), GERD (gastroesophageal reflux disease), Glaucoma, Goiter, Head ache, Heart attack (HCC), Heart murmur, HIV (human immunodeficiency virus infection) (HCC), Hyperlipidemia, Hypertension, IBD (inflammatory bowel disease), Infectious disease, Kidney disease, Liver disease, Meningitis, Migraine, Muscle disorder, Osteoporosis, Parathyroid disorder (HCC), Pituitary disease (HCC), Psychiatric disorder, Pulmonary emphysema (HCC), Renal disorder, Seizure (HCC), Seizure disorder (HCC), Sickle cell disease (HCC), Stroke (HCC), Substance abuse (HCC), Thyroid disease, Tuberculosis, Ulcer, or Urinary tract infection.    Surgical History   has a past surgical history that includes primary c section (N/A, 12/5/2017).     Family History  family history includes Diabetes in her father, mother, and paternal grandfather; Hypertension in her father, mother, and paternal grandfather; No Known Problems in her sister.     Social History   reports that she has never smoked. She has never used smokeless tobacco. She reports that she does not drink alcohol and does not use drugs.    Allergies  No Known Allergies    Medications  Prior to Admission Medications   Prescriptions Last Dose Informant Patient Reported? Taking?   Prenatal MV-Min-Fe Fum-FA-DHA (PRENATAL 1 PO)  Patient Yes No   Sig: Take  by mouth.      Facility-Administered Medications: None       Physical Exam  Vitals:    05/13/21 0735   Weight: 97.1 kg (214 lb)   Height: 1.702 m (5' 7\")       General:   alert, cooperative, moderately obese, no distress   Skin:   normal   HEENT:  Sclera clear, anicteric, Neck supple with midline trachea, Thyroid without masses, Trachea midline and extraocular movements intact   Lungs:   CTA bilateral, clear to auscultation bilaterally   Heart:   regular rate and rhythm   Breasts:   no skin dimpling " or peau d'orange, deferred   Abdomen:  Abdomen soft, non-tender; gravid.   Pelvis: External genitalia: normal general appearance   FHT:  130s  BPM Fetal heart variability: moderate   [place]per[place]:  No ctx   Presentations:   VTX by Le[place]olds   Cervix:     Dilation:      Effacement:      Station:       Consistency:      Position:       NST per my read:  Indications: Obesity complicating pregnancy previous /    FHR baseline: 130s  Accelerations present without decelerations  Moderate FHR variability present  [place]: No contractions noted  NST is reactive      Laboratory:  Prenatal Results     General (Most Recent Result)     Test Value Reference Range Date Time    ABO  A   20 1140    Rh  POS   20 1140    Antibody screen  NEG   20 1140    HbA1c        Chlamydia by PCR  Negative  Negative 20 1052    Gonorrhea by PCR  Negative  Negative 20 1052    RPR/Syphilus  Non-Reactive  Non-Reactive 21 1111    HSV 1/2 by PCR (non-serum)        HSV 1/2 (serum)        HSV 1        HSV 2        HPV (16)  Negative  Negative 20 1052    HBsAg  Non-Reactive  Non-Reactive 20 1138    HIV-1 HIV-2 Antibodies  Non-Reactive  Non Reactive 20 1138    Rubella  16.70 IU/mL  20 1138    Tb              Pap Smear (Most Recent Result)     Test Value Reference Range Date Time    Pap smear        Pap smear w/HPV        Pap smear w/CTNG        Pap smar w/HPV CTNG  (See Report)    20 1052    Pap smear (reflex HPV ACUS)        Pap smear (reflex HPV ASCUS w/CTNG)        Pathology gyn specimen  (See Report)    20 1052          Urinalysis (Most Recent Result)     Test Value Reference Range Date Time    Urinalysis   Abnormal    (See Report)    17 0745    POC urinalysis  (See Report)    19 1450    Urine drug screen (w/o conf)  (See Report)    20 1138    Urine culture ([id number])        Urine culture ([id number])        Urine Protein/Creatinine Ratio              Urinalysis,  Culture if indicated     Test Value Reference Range Date Time    Color  Yellow   12/05/17 0745    Appearance  Cloudy   12/05/17 0745    Specific Gravity  1.020  <1.035 12/05/17 0745    PH  8.0  5.0 - 8.0 12/05/17 0745    Glucose  Negative mg/dL Negative 12/05/17 0745    Ketones  Negative mg/dL Negative 12/05/17 0745    Protein  Negative mg/dL Negative 12/05/17 0745    Bilirubin  Negative  Negative 12/05/17 0745    Nitrites  Negative  Negative 12/05/17 0745    Leukocytes Esterase  Small  Negative 12/05/17 0745    Blood  Negative  Negative 12/05/17 0745    Comment  Microscopic   12/05/17 0745    Culture              Urine Drug Screen     Test Value Reference Range Date Time    Amphetamines        Barbiturates  Negative ng/mL Cutoff 200 11/30/20 1138    Benzodiazepines  Negative ng/mL Cutoff 200 11/30/20 1138    Cocaine  Negative ng/mL Cutoff 150 11/30/20 1138    Methadone  Negative ng/mL Cutoff 150 11/30/20 1138    Opiates        Oxycodone        Phencylidine  Negative ng/mL Cutoff 25 11/30/20 1138    Propoxyphene        Marijuana Metabolite  Negative ng/mL Cutoff 20 11/30/20 1138          1st Trimester     Test Value Reference Range Date Time    Hgb        Hct        Fasting Glucose Tolerance        GTT, 1 hour        GTT, 2 hours        GTT, 3 hours              2nd Trimester     Test Value Reference Range Date Time    Hgb  11.3 g/dL 12.0 - 16.0 02/13/21 1111       12.8 g/dL 12.0 - 16.0 11/30/20 1138    Hct  35.3 % 37.0 - 47.0 02/13/21 1111       37.0 % 37.0 - 47.0 11/30/20 1138    AST        ALT        Uric Acid        Fasting Glucose Tolerance        GTT, 1 hour  104 mg/dL 70 - 139 02/13/21 1111    GTT, 2 hours        GTT, 3 hours              3rd Trimester     Test Value Reference Range Date Time    Hgb        Hct        Platelet count        GBS (SY BROTH)  Negative  Negative 04/27/21 0915          Congenital Disease Screening     Test Value Reference Range Date Time    First Trimester Screen        Quad Screen         BH Electrophoresis        Cystic Fibrosis Carrier Study        SMA        AFP Maternal Serum         AFP Tetra  (See Report)    20 1124    NIPT              Legend    ^: Historical                          Urinalysis:    No results found     Imagin2021 11:25 AM     HISTORY/REASON FOR EXAM:  Evaluate fetal anatomy, size and dates     TECHNIQUE/EXAM DESCRIPTION: OB complete ultrasound.     COMPARISON:  None     FINDINGS:  Fetal Lie:  Vertex  LMP:  2020  Clinical JUD by LMP:  2021     Placenta (Location):  Anterior  Placenta Previa: No  Placental stGstrstastdstest:st st1st Amniotic Fluid Volume:  FRANK = 18.25 cm     Fetal Heart Rate:  149 bpm     Cervical Length:  4.31 cm transabdominal     No maternal adnexal mass is identified.     Umbilical Artery S/D Ratio(s):  Not applicable     Fetal Anatomy  (Seen or Not Seen)  Lateral Ventricles     Seen  Cisterna Magna        Seen  Cerebellum              Seen  CSP             Seen  Orbits             Seen  Face/Lips                Seen  Cord Insertion         Seen  Placental CI         Seen  4 Chamber Heart     Seen  LVOT               Seen  RVOT              Seen  3 Vessel View     Seen  Stomach       Seen  Kidneys                   Seen  Urinary Bladder      Seen  Spine                       Seen  3 Vessel Cord          Seen  Both Upper Extremities    Seen  Both Lower Extremities    Seen  Diaphragm             Seen  Movement       Seen  Gender:  Likely female     Fetal Biometry  BPD    5.36 cm, 22 weeks, 3 days, (75 Percent)  HC    21.16 cm, 23 weeks, 2 days, (94 Percent)  AC    17.41 cm, 22 weeks, 3 days, (68 Percent)  Femur Length    3.83 cm, 22 weeks, 2 days, (64 Percent)  Humerus Length    3.58 cm, 22 weeks, 4 days, (71 Percent)  Cerebellum Diameter   2.38 cm     EGA by this US:  22 weeks, 5 days  JUD by this US: 2021  JUD by [facility] US:  2021 by MD     Estimated Fetal Weight:  500 grams  EFW Percentile: 84 Percent     Comments:  Size equals  dates     IMPRESSION:     Single intrauterine pregnancy of an estimated gestational age of 22 weeks, 5 days with an estimated date of delivery of 2021.     Fetal survey within normal limits.        INTERPRETING LOCATION:  [facility], [id number]      Discussion:  Discussed with the patient indications for  delivery. The patient voiced understanding of indications for  section at this time.    Discussed with the patient the risks of  delivery. The risks include infection, bleeding, scarring, damage to other organs in the area of operation. Specifically organs that can be damaged are bowel, bladder, ureters. I also discussed with the patient the risk of wound infection and wound breakdown. We discussed that these risks are greater in people that have diabetes or obesity. I also discussed the risk of emergency blood transfusion during procedure as well as emergency hysterectomy during procedure.    Discussed today with a risks of tubal ligation. I discussed that the tubal is considered a permanent procedure and the patient will no longer be able to bear children following a tubal. I discussed other forms of birth control which include pills, barrier methods, Depo-Provera, IUD or male sterilization. We discussed  medications or procedures are nonpermanent nor are they surgical. I also discussed the risk of tubal failure with the patient which is one in 200 procedures. We discussed that should the tubal fail there is a risk for ectopic pregnancy which may require surgical intervention.we discussed different methods of sterilization and the plan is for bilateral salpingectomy    Patient had the opportunity to ask questions regarding procedures. All questions answered to the patient's satisfaction.  Informed consents obtained for repeat  and sterilization  Proceed with  delivery and tubal ligation       Assessment:  31 y.o.  39w0d   History of previous    Desires elective repeat   Desires permanent sterilization  Obesity complicating pregnancy  NST is reactive  Labor and delivery indication for care or intervention [O75.9]    Plan:  No new Assessment & Plan notes have been filed under this hospital service since the last note was generated.  Service: Obstetrics & Gynecology    1. Admit to L&D  2.  Informed consents obtained for repeat  and sterilization  3.  Management and plan of care reviewed    VTE prophylaxis: SCDs ordered    Jaden Tucker M.D.

## 2021-05-13 NOTE — OP REPORT
DATE OF SERVICE:  2021     PREOPERATIVE DIAGNOSES:  1.  Intrauterine pregnancy at 39 weeks' gestation.  2.  History of prior  section.  3.  Desires elective repeat .  4.  Desires permanent sterilization.     POSTOPERATIVE DIAGNOSES:  1.  Intrauterine pregnancy at 39 weeks' gestation.  2.  History of prior  section.  3.  Desires elective repeat .  4.  Desires permanent sterilization.     PROCEDURES:  1.  Repeat low transverse  section via Pfannenstiel.  2.  Bilateral salpingectomy.     SURGEON:  Jaden Tucker MD     ASSISTANT:  Ama Newton MD, PGY-1.     ANESTHESIA:  Spinal.     ANESTHESIOLOGIST:  Сергей Junior MD     COMPLICATIONS:  None apparent.     ESTIMATED BLOOD LOSS:  1000 mL.     URINE OUTPUT:  100 mL clear urine.     IV FLUID: 1200 mL lactated Ringer's.     SPECIMEN:  Bilateral fallopian tubes.     FINDINGS:  Liveborn female in vertex presentation with loose nuchal cord x1.    There was clear amniotic fluid.  There was thin lower uterine segment.  There   were normal tubes and ovaries bilaterally.  Uterus was without masses.  Apgars   scores 8 and 9, weight is 9 pounds.     DESCRIPTION OF PROCEDURE:  After informed consents were obtained, the patient   was taken to the operating room where spinal anesthetic was administered.  The   patient was prepped and draped in normal sterile fashion in dorsal supine   position in leftward tilt.  Preoperative antibiotics were given.  A formal   time-out was called.  A Pfannenstiel skin incision was made with a scalpel   following the previous abdominal scar and the incision was taken down to the   fascia.  Fascia was incised in the midline, then extended laterally with   curved Ramirez scissors.  The fascial edges were grasped with Kocher clamp,   elevated and rectus muscle was dissected off sharply.  Rectus muscle was    in midline.  The peritoneum was identified and entered sharply with   Metzenbaum  scissors and the incision was extended cephalad and caudad with   good visualization of the bladder.  Bladder blade was inserted.  The   vesicouterine peritoneum was identified and entered sharply with Metzenbaum   scissors and the incision was extended laterally.  Bladder flap was created   digitally.  Bladder blade was removed and reinserted.  A lower uterine   transverse incision was made with a scalpel and uterine cavity was entered.    The incision was extended laterally with bandage scissors.  Baby's head was   delivered atraumatically.  The nuchal cord was reduced.  The mouth and nose   were suctioned with bulb suction and delayed cord clamping was performed for   30 seconds and baby was handed off active and crying.  Placenta was expressed   from the uterus.  Uterus was exteriorized and cleared of all clots and debris.    Hysterotomy repair was performed in a single layer with 0 Vicryl suture in a   running locked fashion.  There was some light bleeding from the midpoint of   the hysterotomy site and a figure-of-eight stitch with 0 Vicryl was placed   with hemostasis achieved.  Attention was then turned to the left fallopian   tube, which was identified down to the fimbriated end.  The tube was grasped   with [place] clamp, transected and removed with handheld LigaSure device with   hemostasis noted.  In a similar fashion, the right fallopian tube was   identified, transected and removed with LigaSure device with hemostasis   achieved.  Uterus was returned to the abdominal cavity.  The abdominal cavity   was copiously irrigated with warm normal saline.  The gutters were cleared of   all clots and debris.  Hysterotomy and salpingectomy sites were hemostatic.    The peritoneum was then reapproximated with 2-0 Vicryl suture in running   fashion.  The rectus muscle was reapproximated in midline.  Fascia was   reapproximated with 0 Vicryl suture in running fashion.  The subcutaneous   tissue was irrigated and  reapproximated with 2-0 plain suture in running   fashion.  The skin was closed in subcuticular fashion with 4-0 Vicryl suture.     The patient tolerated the procedure well.  Sponge, lap, needle and instrument   counts were correct x3.  The patient and baby were taken to the PACU in stable   fashion.        ______________________________  Jaden Tucker MD RN/JUSTIN/GREGG    DD:  05/13/2021 12:54  DT:  05/13/2021 13:30    Job#:  [id number]

## 2021-05-13 NOTE — OR SURGEON
Immediate Post OP Note    PreOp Diagnosis: 1. Term pregnancy 2. Hx of  3. Desires elective repeat  4. Desires sterilization      PostOp Diagnosis: same      Procedure(s):  1.  SECTION, REPEAT,   2. Bilateral SALPINGECTOMY    Surgeon(s):  Jaden Tucker M.D.    Assistant:  Ama Newton, PGY 1    Anesthesiologist/Type of Anesthesia:  Anesthesiologist: Сергей Junior M.D./ Spinal    Surgical Staff:  Circulator: Joanne Padron R.N.  Scrub Person: Gaby Dooley L.P.N.    Specimens removed if any:  Bilateral fallopian tubes    Estimated Blood Loss: 1000 ml, Urine: 100 ml clear, IVF: 1200 LR    Findings: Liveborn female in vertex presentation, loose nuchal cord x1, clear amniotic fluid, thin lower uterine segment, normal tubes and ovaries bilaterally, uterus is without masses, Apgar score 8 and 9, weight is 9 pounds    Complications: None apparent        2021 12:45 PM Jaden Tucker M.D.

## 2021-05-13 NOTE — ANESTHESIA PREPROCEDURE EVALUATION
Relevant Problems   OB   (positive) History of  delivery, currently pregnant       Physical Exam    Airway   Mallampati: II  TM distance: >3 FB  Neck ROM: full       Cardiovascular - normal exam  Rhythm: regular  Rate: normal  (-) murmur     Dental - normal exam           Pulmonary - normal exam  Breath sounds clear to auscultation     Abdominal    Neurological - normal exam                 Anesthesia Plan    ASA 2       Plan - spinal   Neuraxial block will be primary anesthetic                Postoperative Plan: Postoperative administration of opioids is intended.    Pertinent diagnostic labs and testing reviewed    Informed Consent:    Anesthetic plan and risks discussed with patient.

## 2021-05-13 NOTE — ANESTHESIA TIME REPORT
Anesthesia Start and Stop Event Times     Date Time Event    2021 1135 Anesthesia Start     1248 Anesthesia Stop        Responsible Staff  21    Name Role Begin End    Сергей Junior M.D. Anesth 1135 1248        Preop Diagnosis (Free Text):  Pre-op Diagnosis     PREVIOUS  SECTION, PERMANENT STERILIZATION  39 WEEKS        Preop Diagnosis (Codes):  Diagnosis Information     Diagnosis Code(s):  delivery delivered [O82]        Post op Diagnosis  Term pregnancy      Premium Reason  Non-Premium    Comments:

## 2021-05-14 PROCEDURE — A9270 NON-COVERED ITEM OR SERVICE: HCPCS | Performed by: ANESTHESIOLOGY

## 2021-05-14 PROCEDURE — 700102 HCHG RX REV CODE 250 W/ 637 OVERRIDE(OP): Performed by: OBSTETRICS & GYNECOLOGY

## 2021-05-14 PROCEDURE — A9270 NON-COVERED ITEM OR SERVICE: HCPCS | Performed by: OBSTETRICS & GYNECOLOGY

## 2021-05-14 PROCEDURE — 770002 HCHG ROOM/CARE - OB PRIVATE (112)

## 2021-05-14 PROCEDURE — 700111 HCHG RX REV CODE 636 W/ 250 OVERRIDE (IP): Performed by: ANESTHESIOLOGY

## 2021-05-14 PROCEDURE — 700102 HCHG RX REV CODE 250 W/ 637 OVERRIDE(OP): Performed by: ANESTHESIOLOGY

## 2021-05-14 RX ORDER — HYDROCODONE BITARTRATE AND ACETAMINOPHEN 5; 325 MG/1; MG/1
1-2 TABLET ORAL EVERY 4 HOURS PRN
Status: DISCONTINUED | OUTPATIENT
Start: 2021-05-14 | End: 2021-05-15 | Stop reason: HOSPADM

## 2021-05-14 RX ORDER — IBUPROFEN 600 MG/1
600 TABLET ORAL EVERY 6 HOURS PRN
Status: DISCONTINUED | OUTPATIENT
Start: 2021-05-14 | End: 2021-05-15 | Stop reason: HOSPADM

## 2021-05-14 RX ADMIN — KETOROLAC TROMETHAMINE 30 MG: 30 INJECTION, SOLUTION INTRAMUSCULAR; INTRAVENOUS at 06:22

## 2021-05-14 RX ADMIN — IBUPROFEN 600 MG: 600 TABLET ORAL at 20:52

## 2021-05-14 RX ADMIN — ACETAMINOPHEN 1000 MG: 500 TABLET ORAL at 03:09

## 2021-05-14 RX ADMIN — DIPHENHYDRAMINE HYDROCHLORIDE 12.5 MG: 50 INJECTION INTRAMUSCULAR; INTRAVENOUS at 01:26

## 2021-05-14 RX ADMIN — ACETAMINOPHEN 1000 MG: 500 TABLET ORAL at 09:10

## 2021-05-14 RX ADMIN — PRENATAL WITH FERROUS FUM AND FOLIC ACID 1 TABLET: 3080; 920; 120; 400; 22; 1.84; 3; 20; 10; 1; 12; 200; 27; 25; 2 TABLET ORAL at 09:10

## 2021-05-14 ASSESSMENT — PAIN DESCRIPTION - PAIN TYPE
TYPE: SURGICAL PAIN
TYPE: SURGICAL PAIN
TYPE: SURGICAL PAIN;ACUTE PAIN

## 2021-05-14 NOTE — CARE PLAN
The patient is Stable - Low risk of patient condition declining or worsening    Shift Goals  Clinical Goals:  (fundus to remain firm and monitor bleeding and UO)  Patient Goals:  (manage pain, OOB by midnight)    Progress made toward(s) clinical / shift goals:  progressing    Problem: Knowledge Deficit - Postpartum  Goal: Patient will verbalize and demonstrate understanding of self and infant care  Outcome: Progressing     Problem: Altered Physiologic Condition  Goal: Patient physiologically stable as evidenced by normal lochia, palpable uterine involution and vitals within normal limits  Outcome: Progressing     Problem: Respiratory/Oxygenation Function Post-Surgical  Goal: Patient will achieve/maintain normal respiratory rate/effort  Outcome: Progressing

## 2021-05-14 NOTE — PROGRESS NOTES
Patient arrived in room 341 from L&D.  Report received from ANJELICA Davies.  Bands and cuddles verified.

## 2021-05-14 NOTE — LACTATION NOTE
MOB states baby breastfeeds well and frequently, she denies any pain or discomfort when she breastfeeds, she states she breast fed her first two children for about 3 months each and her last child for about a year and a half, she declines offer for assistance at this time    Written and verbal information provided on outpatient breastfeeding assistance available at the [facility] after discharge and encouraged to call to schedule consult as needed, informed that [facility] is on hold for the time being, zoom meeting information provided as well    EVANGELINA denies having any additional questions or concerns for LC at this time    Encouraged to call for assistance as needed

## 2021-05-14 NOTE — PROGRESS NOTES
Received bedside report from ANJELICA Martell. Discussed plan of care. Patient will call for pain medication as needed. All needs met at this time.

## 2021-05-14 NOTE — CARE PLAN
Problem: Infection - Postpartum  Goal: Postpartum patient will be free of signs and symptoms of infection  Outcome: Progressing           Progress made toward(s) clinical / shift goals:    Problem: Psychosocial - Postpartum  Goal: Patient will verbalize and demonstrate effective bonding and parenting behavior  Outcome: Progressing       Patient is not progressing towards the following goals:

## 2021-05-14 NOTE — PROGRESS NOTES
Assessment completed. Abdominal incision with pressure dressing; dry and intact. Plan of care reviewed; verbalized understanding. Patient reports pain is tolerable at this time. Discussed pain mgmt POC.

## 2021-05-14 NOTE — PROGRESS NOTES
Post Partum Progress Note    Name:   Sierra Burroughs   Date/Time:  2021 - 7:42 AM  Chief Admitting Dx:  Labor and delivery indication for care or intervention [O75.9]  Delivery Type:   for repeat and bilateral salpingectomy   Post-Op/Post Partum Days #:  1    Subjective:  Abdominal pain: yes  Ambulating:   yes  Tolerating liquids:  yes  Tolerating food:  yes common adult  Flatus:   yes  BM:    no  Bleeding:   with a small amount of bleeding  Voiding:   yes  Dizziness:   no  Feeding:   breast    Vitals:    21 0200 21 0300 21 0400 21 0600   BP: (!) 98/61   100/62   Pulse: 60 63 (!) 59 65   Resp: 17 16 16 16   Temp: 36.3 °C (97.4 °F)   36.6 °C (97.9 °F)   TempSrc: Temporal   Temporal   SpO2: 94% 94% 94% 98%   Weight:       Height:           Exam:  Abdomen: Abdomen soft, non-tender. BS normal. No masses,  No organomegaly  Fundal Tenderness:  yes  Fundus Firm: yes  Incision: dry and intact  Below umbilicus: yes  Lochia: mild  Extremities: Normal extremities, peripheral pulses and reflexes normal  CV: Normal S1/S2, no murmurs, rubs, heaves   Resp: chest clear, no wheezes, rhonci, crackles     Meds:  Current Facility-Administered Medications   Medication Dose   • LR infusion     • PRN oxytocin (PITOCIN) (20 Units/1000 mL) PRN for excessive uterine bleeding - See Admin Instr  125-999 mL/hr   • miSOPROStol (CYTOTEC) tablet 600 mcg  600 mcg   • methylergonovine (METHERGINE) injection 0.2 mg  0.2 mg   • docusate sodium (COLACE) capsule 100 mg  100 mg   • simethicone (MYLICON) chewable tab 80 mg  80 mg   • prenatal plus vitamin (STUARTNATAL 1+1) 27-1 MG tablet 1 tablet  1 tablet   • lactated ringers infusion     • acetaminophen (TYLENOL) tablet 1,000 mg  1,000 mg   • oxyCODONE immediate-release (ROXICODONE) tablet 5 mg  5 mg   • oxyCODONE immediate-release (ROXICODONE) tablet 10 mg  10 mg   • HYDROmorphone (Dilaudid) injection 0.2 mg  0.2 mg   • HYDROmorphone (Dilaudid)  injection 0.4 mg  0.4 mg   • ePHEDrine injection 10 mg  10 mg   • ondansetron (ZOFRAN) syringe/vial injection 4 mg  4 mg   • diphenhydrAMINE (BENADRYL) injection 12.5 mg  12.5 mg   • acetaminophen (TYLENOL) tablet 1,000 mg  1,000 mg   • oxytocin (PITOCIN) 20 UNITS/1000ML LR (postpartum)   mL/hr   • ketorolac (TORADOL) injection 30 mg  30 mg       Labs:   Recent Labs     21  0815 21   WBC 11.7* 19.6*   RBC 3.51* 3.05*   HEMOGLOBIN 10.8* 9.6*   HEMATOCRIT 33.7* 29.5*   MCV 96.0 96.7   MCH 30.8 31.5   MCHC 32.0* 32.5*   RDW 44.2 45.0   PLATELETCT 268 269   MPV 10.2 10.5       Assessment:  Chief Admitting Dx:  Labor and delivery indication for care or intervention [O75.9]  Delivery Type:   for repeat  Tubal Ligation:  Yes    31  POD1 s/p repeat  and tubal ligation     Plan:  Continue routine post partum care    #Post-Op care  - Encourage ambulation, abdominal binder PRN, may shower when indicated, continue regular diet   #Post-Op pain  - Regular toradol/tylenol 1000mg with oxycodone for breakthrough pain   #Dispo  -Likely POD2-3    Emmanuel Dolan M.D.

## 2021-05-15 VITALS
TEMPERATURE: 97.5 F | RESPIRATION RATE: 18 BRPM | WEIGHT: 214 LBS | SYSTOLIC BLOOD PRESSURE: 106 MMHG | BODY MASS INDEX: 33.59 KG/M2 | DIASTOLIC BLOOD PRESSURE: 77 MMHG | HEIGHT: 67 IN | OXYGEN SATURATION: 97 % | HEART RATE: 73 BPM

## 2021-05-15 PROCEDURE — A9270 NON-COVERED ITEM OR SERVICE: HCPCS | Performed by: OBSTETRICS & GYNECOLOGY

## 2021-05-15 PROCEDURE — 700102 HCHG RX REV CODE 250 W/ 637 OVERRIDE(OP): Performed by: OBSTETRICS & GYNECOLOGY

## 2021-05-15 RX ORDER — HYDROCODONE BITARTRATE AND ACETAMINOPHEN 5; 325 MG/1; MG/1
1-2 TABLET ORAL EVERY 4 HOURS PRN
Qty: 20 TABLET | Refills: 0 | Status: SHIPPED | OUTPATIENT
Start: 2021-05-15 | End: 2021-05-29

## 2021-05-15 RX ORDER — PSEUDOEPHEDRINE HCL 30 MG
100 TABLET ORAL 2 TIMES DAILY PRN
Qty: 30 CAPSULE | Refills: 4 | Status: SHIPPED | OUTPATIENT
Start: 2021-05-15 | End: 2021-11-18

## 2021-05-15 RX ORDER — FERROUS SULFATE 325(65) MG
325 TABLET ORAL 2 TIMES DAILY
Qty: 30 TABLET | Refills: 4 | Status: SHIPPED | OUTPATIENT
Start: 2021-05-15 | End: 2021-11-18

## 2021-05-15 RX ORDER — IBUPROFEN 800 MG/1
800 TABLET ORAL EVERY 6 HOURS PRN
Qty: 60 TABLET | Refills: 3 | Status: SHIPPED | OUTPATIENT
Start: 2021-05-15 | End: 2021-11-18

## 2021-05-15 RX ADMIN — IBUPROFEN 600 MG: 600 TABLET ORAL at 05:35

## 2021-05-15 RX ADMIN — PRENATAL WITH FERROUS FUM AND FOLIC ACID 1 TABLET: 3080; 920; 120; 400; 22; 1.84; 3; 20; 10; 1; 12; 200; 27; 25; 2 TABLET ORAL at 09:02

## 2021-05-15 ASSESSMENT — EDINBURGH POSTNATAL DEPRESSION SCALE (EPDS)
I HAVE LOOKED FORWARD WITH ENJOYMENT TO THINGS: AS MUCH AS I EVER DID
I HAVE BLAMED MYSELF UNNECESSARILY WHEN THINGS WENT WRONG: NOT VERY OFTEN
THE THOUGHT OF HARMING MYSELF HAS OCCURRED TO ME: NEVER
I HAVE FELT SCARED OR PANICKY FOR NO GOOD REASON: YES, SOMETIMES
I HAVE BEEN SO UNHAPPY THAT I HAVE BEEN CRYING: NO, NEVER
I HAVE BEEN ABLE TO LAUGH AND SEE THE FUNNY SIDE OF THINGS: AS MUCH AS I ALWAYS COULD
I HAVE FELT SAD OR MISERABLE: NOT VERY OFTEN
I HAVE BEEN ANXIOUS OR WORRIED FOR NO GOOD REASON: YES, SOMETIMES
I HAVE BEEN SO UNHAPPY THAT I HAVE HAD DIFFICULTY SLEEPING: NOT AT ALL
THINGS HAVE BEEN GETTING ON TOP OF ME: NO, I HAVE BEEN COPING AS WELL AS EVER

## 2021-05-15 ASSESSMENT — PAIN DESCRIPTION - PAIN TYPE
TYPE: ACUTE PAIN
TYPE: SURGICAL PAIN

## 2021-05-15 NOTE — CARE PLAN
The patient is Stable - Low risk of patient condition declining or worsening    Shift Goals  Clinical Goals:  (ambulate at least x3; utilize IS hourly )  Patient Goals:  (manage pain)  Problem: Knowledge Deficit - Postpartum  Goal: Patient will verbalize and demonstrate understanding of self and infant care  Outcome: Met     Problem: Psychosocial - Postpartum  Goal: Patient will verbalize and demonstrate effective bonding and parenting behavior  Outcome: Met     Problem: Altered Physiologic Condition  Goal: Patient physiologically stable as evidenced by normal lochia, palpable uterine involution and vitals within normal limits  Outcome: Met     Problem: Infection - Postpartum  Goal: Postpartum patient will be free of signs and symptoms of infection  Outcome: Met     Problem: Respiratory/Oxygenation Function Post-Surgical  Goal: Patient will achieve/maintain normal respiratory rate/effort  Outcome: Met     Problem: Early Mobilization - Post Surgery  Goal: Early mobilization post surgery  Outcome: Met       Progress made toward(s) clinical / shift goals:  met    Patient is not progressing towards the following goals:

## 2021-05-15 NOTE — PROGRESS NOTES
1900-Received report from ANJELICA Shabazz. Assumed patient care. POC for evening discussed with patient. No additional needs at this time. Pain 0/10.     2030-Assessment complete. Patient states pain 4/10 at incision site. Medication administered. See MAR.

## 2021-05-15 NOTE — DISCHARGE SUMMARY
Discharge Summary:      Sierra Burroughs    Admit Date:   2021  Discharge Date:  5/15/2021     Admitting diagnosis:  Labor and delivery indication for care or intervention [O75.9]  Discharge Diagnosis: Status post  for repeat.  Pregnancy Complications: none  Tubal Ligation:  yes        History:  No past medical history on file.  OB History    Para Term  AB Living   4 4 4     4   SAB TAB Ectopic Molar Multiple Live Births           0 4      # Outcome Date GA Lbr Trevor/2nd Weight Sex Delivery Anes PTL Lv   4 Term 21 39w0d  4.07 kg (8 lb 15.6 oz) F CS-LTranv Spinal N JESSE   3 Term 17 39w2d  3.42 kg (7 lb 8.6 oz) F CS-LTranv Spinal N JESSE      Birth Comments: Primary C/S, breech   2 Term 11 39w0d  3.6 kg (7 lb 15 oz) F Vag-Spont EPI N JESSE      Birth Comments: Pt states no complications.    1 Term 10/18/08 39w0d  3.657 kg (8 lb 1 oz) M Vag-Spont EPI N JESSE      Birth Comments: Pt states no complications.         Patient has no known allergies.  Patient Active Problem List    Diagnosis Date Noted   • High-risk pregnancy supervision, third trimester 05/10/2021   • Obesity in pregnancy 05/10/2021   • History of  delivery, currently pregnant 10/20/2020        Hospital Course:   31 y.o. , now para 4, was admitted with the above mentioned diagnosis, underwent Repeat  repeat,  for repeat. Patient postpartum course was unremarkable, with progressive advancement in diet , ambulation and toleration of oral analgesia. Patient without complaints today and desires discharge.      Vitals:    21 1000 21 1400 21 1800 05/15/21 0600   BP: 105/76  125/80 106/77   Pulse: 70 71 72 73   Resp: 18  18 18   Temp: 36.5 °C (97.7 °F)  36.4 °C (97.5 °F) 36.4 °C (97.5 °F)   TempSrc: Temporal  Temporal Temporal   SpO2: 95%  97% 97%   Weight:       Height:           Current Facility-Administered Medications   Medication Dose   • ibuprofen (MOTRIN)  tablet 600 mg  600 mg   • HYDROcodone-acetaminophen (NORCO) 5-325 MG per tablet 1-2 tablet  1-2 tablet   • LR infusion     • PRN oxytocin (PITOCIN) (20 Units/1000 mL) PRN for excessive uterine bleeding - See Admin Instr  125-999 mL/hr   • miSOPROStol (CYTOTEC) tablet 600 mcg  600 mcg   • methylergonovine (METHERGINE) injection 0.2 mg  0.2 mg   • docusate sodium (COLACE) capsule 100 mg  100 mg   • simethicone (MYLICON) chewable tab 80 mg  80 mg   • prenatal plus vitamin (STUARTNATAL 1+1) 27-1 MG tablet 1 tablet  1 tablet   • lactated ringers infusion     • acetaminophen (TYLENOL) tablet 1,000 mg  1,000 mg   • oxytocin (PITOCIN) 20 UNITS/1000ML LR (postpartum)   mL/hr       Exam:  Breast Exam: negative  Abdomen: Abdomen soft, non-tender. BS normal. No masses,  No organomegaly  Fundus Non Tender: yes  Incision: dry and intact  Perineum: perineum intact  Extremity: extremities, peripheral pulses and reflexes normal, no edema, redness or tenderness in the calves or thighs     Labs:  Recent Labs     05/13/21  0815 05/13/21 2051   WBC 11.7* 19.6*   RBC 3.51* 3.05*   HEMOGLOBIN 10.8* 9.6*   HEMATOCRIT 33.7* 29.5*   MCV 96.0 96.7   MCH 30.8 31.5   MCHC 32.0* 32.5*   RDW 44.2 45.0   PLATELETCT 268 269   MPV 10.2 10.5        Activity:   Discharge to home  Pelvic Rest x 6 weeks    Assessment:  normal postpartum course  Discharge Assessment: No areas of skin breakdown/redness; surgical incision intact/healing     Follow [facility]: [facility] or [facility] in 5 weeks for vaginal ; 1 week for incision check.   Asymptomatic anemia      Discharge Meds:   Current Outpatient Medications   Medication Sig Dispense Refill   • docusate sodium 100 MG Cap Take 100 mg by mouth 2 times a day as needed for Constipation. 30 capsule 4   • ibuprofen (MOTRIN) 800 MG Tab Take 1 tablet by mouth every 6 hours as needed. 60 tablet 3   • HYDROcodone-acetaminophen (NORCO) 5-325 MG Tab per tablet Take 1-2 Tablets by mouth every four hours as needed  for up to 14 days. 20 tablet 0   • ferrous sulfate 325 (65 Fe) MG tablet Take 1 tablet by mouth 2 times a day. 30 tablet 4       TONYA Cagle.

## 2021-05-15 NOTE — LACTATION NOTE
This note was copied from a baby's chart.  Mom is a 30 y/o P4 who delivered baby girl [id number] # 15.6 oz at 39 wks. Mom reports darker and enlarged areolas during pregnancy. Mom denies any breast surgeries, diabetes, thyroid or fertility issues.   Reviewed normal  behavior and feeding patterns. Encouraged to do skin to skin during waking hours and feed when noting early feeding cues of licking lips, stirring in sleep and putting hand to mouth.   Mom has a pump at home if needed. She reports that baby is feeding well and she has no concerns. Reviewed how to tell if baby is getting enough.    -Demand feed baby 10 or more times in 24 hours. Be aware that periods of cluster feeding are normal.   -Observe for voids and stools. Noted stool's transitioning color  -Follow up with pediatrician as instructed. If any concerns arise regarding feedings, jaundice levels, decreased output, or  consistently poor feedings, call baby's doctor.    Provided handouts for outpatient breastfeeding support [place] and phone number for private lactation consults

## 2021-05-15 NOTE — PROGRESS NOTES
Received bedside report from ANJELICA Mayfield. Discussed plan of care. Patient will call for pain medication as needed. All needs met at this time.

## 2021-05-15 NOTE — DISCHARGE INSTRUCTIONS
PATIENT DISCHARGE EDUCATION INSTRUCTION SHEET  REASONS TO CALL YOUR OBSTETRICIAN  · Persistent fever, shaking, chills (Temperature higher than 100.4) may indicate you have an infection  · Heavy bleeding: soaking more than 1 pad per hour; Passing clots an egg-sized clot or bigger may mean you have an postpartum hemorrhage  · Foul odor from vagina or bad smelling or discolored discharge or blood  · Breast infection (Mastitis symptoms); breast pain, chills, fever, redness or red streaks, may feel flu like symptoms  · Urinary pain, burning or frequency  · Incision that is not healing, increased redness, swelling, tenderness or pain, or any pus from episiotomy or  site may mean you have an infection  · Redness, swelling, warmth, or painful to touch in the calf area of your leg may mean you have a blood clot  · Severe or intensified depression, thoughts or feelings of wanting to hurt yourself or someone else   · Pain in chest, obstructed breathing or shortness of breath (trouble catching your breath) may mean you are having a postpartum complication. Call your provider immediately   · Headache that does not get better, even after taking medicine, a bad headache with vision changes or pain in the upper right area of your belly may mean you have high blood pressure or post birth preeclampsia. Call your provider immediately    HAND WASHING  All family and friends should wash their hands:  · Before and after holding the baby  · Before feeding the baby  · After using the restroom or changing the baby's diaper    WOUND CARE  Ask your physician for additional care instructions. In general:  ·  Incision:  · May shower and pat incision dry   · Keep the incision clean and dry  · There should not be any opening or pus from the incision  · Continue to walk at home 3 times a day   · Do NOT lift anything heavier than your baby (over 10 pounds)  · Encourage family to help participate in care of the  to allow  rest and mom time to heal  · Episiotomy/Laceration  · May use cj-spray bottle, witch hazel pads and dermaplast spray for comfort  · Use cj-spray bottle after urinating to cleanse perineal area  · To prevent burning during urination spray cj-water bottle on labial area   · Pat perineal area dry until episiotomy/laceration is healed  · Continue to use cj-bottle until bleeding stops as needed  · If have a 2nd degree laceration or greater, a Sitz bath can offer relief from soreness, burning, and inflammation   · Sitz Bath   · Sit in 6 inches of warm water and soak laceration as needed until the laceration heals    VAGINAL CARE AND BLEEDING  · Nothing inside vagina for 6 weeks:   · No sexual intercourse, tampons or douching  · Bleeding may continue for 2-4 weeks. Amount and color may vary  · Soaking 1 pad or more in an hour for several hours is considered heavy bleeding  · Passing large egg sized blood clots can be concerning  · If you feel like you have heavy bleeding or are having increasing amount of blood clots call your Obstetrician immediately  · If you begin feeling faint upon standing, feeling sick to your stomach, have clammy skin, a really fast heartbeat, have chills, start feeling confused, dizzy, sleepy or weak, or feeling like you're going to faint call your Obstetrician immediately    HYPERTENSION   Preeclampsia or gestational hypertension are types of high blood pressure that only pregnant women can get. It is important for you to be aware of symptoms to seek early intervention and treatment. If you have any of these symptoms immediately call your Obstetrician    · Vision changes or blurred vision   · Severe headache or pain that is unrelieved with medication and will not go away  · Persistent pain in upper abdomen or shoulder   · Increased swelling of face, feet, or hands  · Difficulty breathing or shortness of breath at rest  · Urinating less than usual    URINATION AND BOWEL MOVEMENTS  · Eating  "more fiber (bran cereal, fruits, and vegetables) and drinking plenty of fluids will help to avoid constipation  · Urinary frequency and urgency after childbirth is normal  · If you experience any urinary pain, burning or frequency call your provider    BIRTH CONTROL  · It is possible to become pregnant at any time after delivery and while breastfeeding  · Plan to discuss a method of birth control with your physician at your post delivery follow up visit    POSTPARTUM BLUES  During the first few days after birth, you may experience a sense of the \"blues\" which may include impatience, irritability or even crying. These feelings come and go quickly. However, as many as 1 in 10 women experience emotional symptoms known as postpartum depression.     POSTPARTUM DEPRESSION    May start as early as the second or third day after delivery or take several weeks or months to develop. Symptoms of \"blues\" are present, but are more intense: Crying spells; loss of appetite; feelings of hopelessness or loss of control; fear of touching the baby; over concern or no concern at all about the baby; little or no concern about your own appearance/caring for yourself; and/or inability to sleep or excessive sleeping. Contact your Obstetrician if you are experiencing any of these symptoms     PREVENTING SHAKEN BABY  If you are angry or stressed, PUT THE BABY IN THE CRIB, step away, take some deep breaths, and wait until you are calm to care for the baby. DO NOT SHAKE THE BABY. You are not alone, call a supporter for help.  · [facility] 24/7 crisis call line (929-424-7341) or (1-773.854.2463)  · You can also text them, text \"ANSWER\" (830741)      "

## 2021-05-25 ENCOUNTER — POST PARTUM (OUTPATIENT)
Dept: OBGYN | Facility: CLINIC | Age: 31
End: 2021-05-25
Payer: COMMERCIAL

## 2021-05-25 VITALS — SYSTOLIC BLOOD PRESSURE: 130 MMHG | WEIGHT: 197 LBS | DIASTOLIC BLOOD PRESSURE: 80 MMHG | BODY MASS INDEX: 30.85 KG/M2

## 2021-05-25 DIAGNOSIS — Z98.891 S/P CESAREAN SECTION: ICD-10-CM

## 2021-05-25 DIAGNOSIS — Z09 POSTOP CHECK: ICD-10-CM

## 2021-05-25 PROCEDURE — 99024 POSTOP FOLLOW-UP VISIT: CPT | Performed by: OBSTETRICS & GYNECOLOGY

## 2021-05-25 ASSESSMENT — EDINBURGH POSTNATAL DEPRESSION SCALE (EPDS)
TOTAL SCORE: 0
I HAVE BEEN SO UNHAPPY THAT I HAVE HAD DIFFICULTY SLEEPING: NOT AT ALL
I HAVE FELT SCARED OR PANICKY FOR NO GOOD REASON: NO, NOT AT ALL
THE THOUGHT OF HARMING MYSELF HAS OCCURRED TO ME: NEVER
THINGS HAVE BEEN GETTING ON TOP OF ME: NO, I HAVE BEEN COPING AS WELL AS EVER
I HAVE BEEN SO UNHAPPY THAT I HAVE BEEN CRYING: NO, NEVER
I HAVE FELT SAD OR MISERABLE: NO, NOT AT ALL
I HAVE BLAMED MYSELF UNNECESSARILY WHEN THINGS WENT WRONG: NO, NEVER
I HAVE LOOKED FORWARD WITH ENJOYMENT TO THINGS: AS MUCH AS I EVER DID
I HAVE BEEN ANXIOUS OR WORRIED FOR NO GOOD REASON: NO, NOT AT ALL
I HAVE BEEN ABLE TO LAUGH AND SEE THE FUNNY SIDE OF THINGS: AS MUCH AS I ALWAYS COULD

## 2021-05-25 NOTE — PROGRESS NOTES
Subjective:      Sierra Burroughs is a 31 y.o. female who presents for postop            HPI patient is a 32-year-old G4, P4 who presents today for postop visit status post repeat  and bilateral salpingectomy on 2021.  Patient is postoperative day #12.  Patient is doing well currently.  Patient is breast-feeding.  Denies any depression symptoms.  Denies vaginal bleeding.  Reports normal bowel and bladder functions.  Patient states she removed herself from her incision.  EPDS 0    ROS all organ systems were reviewed and were negative except for complaints in HPI       Objective:     LMP 2020      Physical Exam  Vitals and nursing note reviewed. Exam conducted with a chaperone present.   Constitutional:       General: She is not in acute distress.     Appearance: Normal appearance. She is not toxic-appearing.   HENT:      Nose: Nose normal.   Eyes:      General: No scleral icterus.        Right eye: No discharge.         Left eye: No discharge.      Conjunctiva/sclera: Conjunctivae normal.   Cardiovascular:      Pulses: Normal pulses.      Heart sounds: Normal heart sounds. No murmur heard.   No gallop.    Pulmonary:      Effort: Pulmonary effort is normal. No respiratory distress.      Breath sounds: Normal breath sounds. No wheezing.   Abdominal:      General: Abdomen is flat. Bowel sounds are normal. There is no distension.      Palpations: Abdomen is soft.      Tenderness: There is no abdominal tenderness.      Comments: Incision is intact and healing well without sign of infection.  No erythema or fluctuance   Musculoskeletal:         General: Normal range of motion.      Right lower leg: No edema.      Left lower leg: No edema.   Skin:     General: Skin is warm and dry.      Coloration: Skin is not jaundiced.   Neurological:      General: No focal deficit present.      Mental Status: She is alert and oriented to person, place, and time.      Cranial Nerves: No cranial nerve  deficit.      Motor: No weakness.   Psychiatric:         Mood and Affect: Mood normal.         Behavior: Behavior normal.         Thought Content: Thought content normal.         Judgment: Judgment normal.                        Assessment/Plan:        1. Postop check  Patient is postop day #12 status post repeat  with bilateral salpingectomy.  Patient is doing well and incision is healed.  Patient is to continue postoperative restrictions for a total of 5 weeks    2. S/P  section  Incision is healing well    3.  Precautions and plan of care reviewed.  Patient to follow-up in 4 weeks for postpartum exam.  She will call us with any questions or concerns

## 2021-05-25 NOTE — PROGRESS NOTES
Pt here today for postpartum exam.  Operation Date: 05/13/21  Currently: breast feeding   Good ph: [phone]

## 2021-06-24 ENCOUNTER — TELEPHONE (OUTPATIENT)
Dept: SCHEDULING | Facility: IMAGING CENTER | Age: 31
End: 2021-06-24

## 2021-06-24 ENCOUNTER — TELEPHONE (OUTPATIENT)
Dept: HEALTH INFORMATION MANAGEMENT | Facility: OTHER | Age: 31
End: 2021-06-24

## 2021-06-24 ENCOUNTER — POST PARTUM (OUTPATIENT)
Dept: OBGYN | Facility: CLINIC | Age: 31
End: 2021-06-24
Payer: COMMERCIAL

## 2021-06-24 VITALS — DIASTOLIC BLOOD PRESSURE: 71 MMHG | SYSTOLIC BLOOD PRESSURE: 108 MMHG | BODY MASS INDEX: 29.6 KG/M2 | WEIGHT: 189 LBS

## 2021-06-24 DIAGNOSIS — Z98.51 S/P TUBAL LIGATION: ICD-10-CM

## 2021-06-24 PROBLEM — O34.219 HISTORY OF CESAREAN DELIVERY, CURRENTLY PREGNANT: Status: RESOLVED | Noted: 2020-10-20 | Resolved: 2021-06-24

## 2021-06-24 PROBLEM — O09.93 HIGH-RISK PREGNANCY SUPERVISION, THIRD TRIMESTER: Status: RESOLVED | Noted: 2021-05-10 | Resolved: 2021-06-24

## 2021-06-24 PROBLEM — O99.210 OBESITY IN PREGNANCY: Status: RESOLVED | Noted: 2021-05-10 | Resolved: 2021-06-24

## 2021-06-24 PROCEDURE — 90050 PR POSTPARTUM VISIT: CPT | Performed by: OBSTETRICS & GYNECOLOGY

## 2021-06-24 ASSESSMENT — EDINBURGH POSTNATAL DEPRESSION SCALE (EPDS)
THINGS HAVE BEEN GETTING ON TOP OF ME: NO, I HAVE BEEN COPING AS WELL AS EVER
I HAVE BEEN ANXIOUS OR WORRIED FOR NO GOOD REASON: NO, NOT AT ALL
I HAVE BEEN ABLE TO LAUGH AND SEE THE FUNNY SIDE OF THINGS: AS MUCH AS I ALWAYS COULD
I HAVE BEEN SO UNHAPPY THAT I HAVE HAD DIFFICULTY SLEEPING: NOT AT ALL
I HAVE FELT SCARED OR PANICKY FOR NO GOOD REASON: NO, NOT AT ALL
I HAVE BEEN SO UNHAPPY THAT I HAVE BEEN CRYING: NO, NEVER
THE THOUGHT OF HARMING MYSELF HAS OCCURRED TO ME: NEVER
TOTAL SCORE: 1
I HAVE FELT SAD OR MISERABLE: NO, NOT AT ALL
I HAVE LOOKED FORWARD WITH ENJOYMENT TO THINGS: AS MUCH AS I EVER DID
I HAVE BLAMED MYSELF UNNECESSARILY WHEN THINGS WENT WRONG: NOT VERY OFTEN

## 2021-06-24 NOTE — PROGRESS NOTES
Pt here today for postpartum exam.  Delivery Date: 5/13/21 Repeat c/s and BTL  Currently: Breast feeding   BCM:Pt had BTL  Good ph: [phone]  Pharmacy verified  Pt denies any problems at this time   EPDS score - 1

## 2021-06-24 NOTE — PROGRESS NOTES
Subjective:      Sierra Burroughs is a 31 y.o. female who presents for postpartum exam            HPI patient is a 31-year-old G4, P4 who presents today for postpartum exam status post repeat  with sterilization/bilateral salpingectomy.  Patient is doing well without any complaints or concerns.  States she feels happy and has not had any depression symptoms.  She is breast-feeding and pumping and plans to lactate/breast-feeding as long as possible.  She reports no abnormal bleeding.  Reports normal bowel and bladder functions.  Denies headaches fevers or dysuria    ROS all organ systems are reviewed and are currently negative       Objective:     /71   Wt 85.7 kg (189 lb)   LMP 2020   BMI 29.60 kg/m²      Physical Exam  Vitals and nursing note reviewed. Exam conducted with a chaperone present.   Constitutional:       General: She is not in acute distress.     Appearance: Normal appearance. She is obese. She is not toxic-appearing.   HENT:      Head: Normocephalic and atraumatic.   Eyes:      General: No scleral icterus.        Right eye: No discharge.         Left eye: No discharge.      Conjunctiva/sclera: Conjunctivae normal.   Cardiovascular:      Rate and Rhythm: Normal rate and regular rhythm.      Pulses: Normal pulses.      Heart sounds: Normal heart sounds. No murmur heard.   No gallop.    Pulmonary:      Effort: Pulmonary effort is normal. No respiratory distress.      Breath sounds: Normal breath sounds. No wheezing.   Chest:      Breasts:         Right: No swelling, bleeding, inverted nipple, mass, nipple discharge, skin change or tenderness.         Left: No swelling, bleeding, inverted nipple, mass, nipple discharge, skin change or tenderness.   Abdominal:      General: Abdomen is flat. Bowel sounds are normal. There is no distension.      Palpations: Abdomen is soft.      Tenderness: There is no abdominal tenderness. There is no guarding.      Comments:   incision is healed and nontender   Genitourinary:     General: Normal vulva.      Labia:         Right: No rash, tenderness, lesion or injury.         Left: No rash, tenderness or lesion.       Urethra: No prolapse.      Vagina: No vaginal discharge, tenderness or bleeding.      Cervix: No cervical motion tenderness, friability or erythema.      Uterus: Not enlarged and not tender.       Adnexa:         Right: No mass, tenderness or fullness.          Left: No mass, tenderness or fullness.        Rectum: No external hemorrhoid.   Musculoskeletal:         General: Normal range of motion.      Cervical back: Normal range of motion and neck supple. No rigidity.      Right lower leg: No edema.      Left lower leg: No edema.   Lymphadenopathy:      Cervical: No cervical adenopathy.      Upper Body:      Right upper body: No supraclavicular or axillary adenopathy.      Left upper body: No supraclavicular or axillary adenopathy.      Lower Body: No right inguinal adenopathy. No left inguinal adenopathy.   Skin:     General: Skin is warm and dry.      Coloration: Skin is not jaundiced.      Findings: No bruising.   Neurological:      General: No focal deficit present.      Mental Status: She is alert and oriented to person, place, and time.      Motor: No weakness.      Gait: Gait normal.   Psychiatric:         Mood and Affect: Mood normal.         Behavior: Behavior normal.         Thought Content: Thought content normal.         Judgment: Judgment normal.                        Assessment/Plan:        1. Postpartum examination following  delivery  31-year-old multiparous female here for postpartum exam.  Exam is within normal limits  Patient can resume full activities without restrictions  Safe sex practices discussed  Diet and exercise reviewed  Self breast exam discussed  Last Pap smear was a year ago.  Screening recommendations reviewed    2. S/P tubal ligation  Patient had bilateral salpingectomy    3.   Precautions and plan of care were reviewed.  Patient to follow-up for annual gynecologic exam and as needed for any gynecologic problems

## 2021-09-07 ENCOUNTER — TELEPHONE (OUTPATIENT)
Dept: OBGYN | Facility: CLINIC | Age: 31
End: 2021-09-07

## 2021-09-07 NOTE — TELEPHONE ENCOUNTER
Called PT to inform her that her Return to work form has been completed and signed. PT asked me to e-mail it to her, I informed the PT that I would e-mail that form to her now. PT verbalized understanding.

## 2021-10-08 ENCOUNTER — TELEPHONE (OUTPATIENT)
Dept: OBGYN | Facility: CLINIC | Age: 31
End: 2021-10-08

## 2021-10-08 NOTE — TELEPHONE ENCOUNTER
I called PT to inform her that I spoke with Dr. Rice regarding her FMLA. PT wants to take 52 weeks off of work, she states [employer] is allowing that 52 weeks off. I informed her Dr. Rice stated since it is not medically necessary to take 52 weeks off he is unable to sign this form. I informed PT we would need some sort of a policy or formal paper from her employer stating she is allowed the 52 weeks off before we could go through with the paperwork. Provided PT with our fax number . PT verbalized understanding.

## 2021-11-15 ENCOUNTER — TELEPHONE (OUTPATIENT)
Dept: HEALTH INFORMATION MANAGEMENT | Facility: OTHER | Age: 31
End: 2021-11-15

## 2021-11-18 ENCOUNTER — OFFICE VISIT (OUTPATIENT)
Dept: MEDICAL GROUP | Facility: PHYSICIAN GROUP | Age: 31
End: 2021-11-18
Payer: COMMERCIAL

## 2021-11-18 VITALS
RESPIRATION RATE: 20 BRPM | BODY MASS INDEX: 28.09 KG/M2 | HEIGHT: 67 IN | SYSTOLIC BLOOD PRESSURE: 114 MMHG | DIASTOLIC BLOOD PRESSURE: 76 MMHG | WEIGHT: 179 LBS | TEMPERATURE: 96.7 F | OXYGEN SATURATION: 96 % | HEART RATE: 66 BPM

## 2021-11-18 DIAGNOSIS — Z13.29 SCREENING FOR ENDOCRINE DISORDER: ICD-10-CM

## 2021-11-18 DIAGNOSIS — J30.2 SEASONAL ALLERGIES: ICD-10-CM

## 2021-11-18 DIAGNOSIS — Z13.6 SCREENING FOR CARDIOVASCULAR CONDITION: ICD-10-CM

## 2021-11-18 PROCEDURE — 99213 OFFICE O/P EST LOW 20 MIN: CPT | Performed by: NURSE PRACTITIONER

## 2021-11-18 RX ORDER — FLUTICASONE PROPIONATE 50 MCG
1 SPRAY, SUSPENSION (ML) NASAL 2 TIMES DAILY
Qty: 18.2 G | Refills: 11 | Status: SHIPPED | OUTPATIENT
Start: 2021-11-18 | End: 2023-12-30

## 2021-11-18 RX ORDER — CETIRIZINE HYDROCHLORIDE 10 MG/1
10 TABLET ORAL DAILY
Qty: 90 TABLET | Refills: 3 | Status: SHIPPED | OUTPATIENT
Start: 2021-11-18 | End: 2023-12-30

## 2021-11-18 NOTE — PROGRESS NOTES
"Chief Complaint   Patient presents with   • New Patient     Est care / would like labs ordered    • Referral Needed     ENT       HISTORY OF PRESENT ILLNESS: Patient is a 31 y.o. female established patient who presents today to establish care, discuss nasal congestion    Seasonal allergies  Pleasant 31-year-old female patient presents today to establish care with new PCP, however she has concerns for persistent nasal congestion  She has seen ENT in the distant past for which she was treated with what sounds to be a Kenalog injection  She has not been using a consistent allergy regimen  Upon physical examination she was noted to have bilaterally retracted TMs, this was mild but she does have erythematous and edematous nasal mucosa  She is not currently taking anything at this time for her allergies or congestion other than over-the-counter Afrin  She does agree to starting second-generation antihistamine, she will start Zyrtec 10 mg daily along with Flonase 1 spray to each nostril twice daily and generous use of nasal saline  Advised her to take this medication very consistently for the next 6 weeks, at follow-up we will determine if she does need a referral to ENT or allergy        ROS: per HPI    Exam:  /76 (BP Location: Left arm, Patient Position: Sitting, BP Cuff Size: Adult)   Pulse 66   Temp 35.9 °C (96.7 °F) (Temporal)   Resp 20   Ht 1.702 m (5' 7\")   Wt 81.2 kg (179 lb)   SpO2 96%   General:  Well nourished, well developed female in NAD  Skin: warm, dry, intact, no evidence of rash or concerning lesions  Head: is grossly normal.  HEENT: eyes clear, conjunctiva normal, PERRLA,TMs mildly retracted bilaterally, erythematous nasal mucosa with mild edema  Pulmonary: Clear to ausculation. Normal effort. No rales, ronchi, or wheezing.  Cardiovascular: Regular rate and rhythm without murmur. Carotid and radial pulses are intact and equal bilaterally.  Abdomen: soft, non-tender, positive bowel " sounds  Musculoskeletal: no clubbing, cyanosis, or edema.  Psych/mental: no depression, anxiety, hallucinations  Neuro: alert, intact, CN 2-12 grossly intact      Medical decision-making and discussion:  Assessment/Plan:      1. Seasonal allergies  Discussed importance of consistency with allergy regimen  Follow-up 6 weeks  In addition to below medications advised to generously use nasal saline  - cetirizine (ZYRTEC) 10 MG Tab; Take 1 Tablet by mouth every day.  Dispense: 90 Tablet; Refill: 3  - fluticasone (FLONASE) 50 MCG/ACT nasal spray; Administer 1 Spray into affected nostril(S) 2 times a day.  Dispense: 18.2 g; Refill: 11    2. Screening for cardiovascular condition    - HEMOGLOBIN A1C; Future  - TSH WITH REFLEX TO FT4; Future    3. Screening for endocrine disorder    - CBC WITH DIFFERENTIAL; Future  - Comp Metabolic Panel; Future  - Lipid Profile; Future         Return in about 6 weeks (around 12/30/2021) for Follow-up, Discuss Labs.        Please note that this dictation was created using voice recognition software. I have made every reasonable attempt to correct obvious errors, but I expect that there are errors of grammar and possibly content that I did not discover before finalizing the note.

## 2021-11-18 NOTE — ASSESSMENT & PLAN NOTE
Pleasant 31-year-old female patient presents today to establish care with new PCP, however she has concerns for persistent nasal congestion  She has seen ENT in the distant past for which she was treated with what sounds to be a Kenalog injection  She has not been using a consistent allergy regimen  Upon physical examination she was noted to have bilaterally retracted TMs, this was mild but she does have erythematous and edematous nasal mucosa  She is not currently taking anything at this time for her allergies or congestion other than over-the-counter Afrin  She does agree to starting second-generation antihistamine, she will start Zyrtec 10 mg daily along with Flonase 1 spray to each nostril twice daily and generous use of nasal saline  Advised her to take this medication very consistently for the next 6 weeks, at follow-up we will determine if she does need a referral to ENT or allergy

## 2021-11-18 NOTE — PATIENT INSTRUCTIONS
Second generation antihistamine such as allegra, zyrtec or xyxal    Nasal steroid--flonase or nasonex    Generous use of nasal saline, 2 sprays to each nostril multiple times a day    Labs anytime sooner    Follow up in 6 weeks, will discuss possible need for ent or allergy at that time

## 2021-12-02 ENCOUNTER — HOSPITAL ENCOUNTER (OUTPATIENT)
Dept: LAB | Facility: MEDICAL CENTER | Age: 31
End: 2021-12-02
Attending: NURSE PRACTITIONER
Payer: COMMERCIAL

## 2021-12-02 DIAGNOSIS — Z13.6 SCREENING FOR CARDIOVASCULAR CONDITION: ICD-10-CM

## 2021-12-02 DIAGNOSIS — Z13.29 SCREENING FOR ENDOCRINE DISORDER: ICD-10-CM

## 2021-12-02 LAB
ALBUMIN SERPL BCP-MCNC: 4.8 G/DL (ref 3.2–4.9)
ALBUMIN/GLOB SERPL: 2 G/DL
ALP SERPL-CCNC: 67 U/L (ref 30–99)
ALT SERPL-CCNC: 10 U/L (ref 2–50)
ANION GAP SERPL CALC-SCNC: 9 MMOL/L (ref 7–16)
AST SERPL-CCNC: 16 U/L (ref 12–45)
BASOPHILS # BLD AUTO: 0.8 % (ref 0–1.8)
BASOPHILS # BLD: 0.06 K/UL (ref 0–0.12)
BILIRUB SERPL-MCNC: 0.7 MG/DL (ref 0.1–1.5)
BUN SERPL-MCNC: 14 MG/DL (ref 8–22)
CALCIUM SERPL-MCNC: 9.3 MG/DL (ref 8.5–10.5)
CHLORIDE SERPL-SCNC: 101 MMOL/L (ref 96–112)
CHOLEST SERPL-MCNC: 145 MG/DL (ref 100–199)
CO2 SERPL-SCNC: 27 MMOL/L (ref 20–33)
CREAT SERPL-MCNC: 0.7 MG/DL (ref 0.5–1.4)
EOSINOPHIL # BLD AUTO: 0.19 K/UL (ref 0–0.51)
EOSINOPHIL NFR BLD: 2.5 % (ref 0–6.9)
ERYTHROCYTE [DISTWIDTH] IN BLOOD BY AUTOMATED COUNT: 41.8 FL (ref 35.9–50)
EST. AVERAGE GLUCOSE BLD GHB EST-MCNC: 100 MG/DL
FASTING STATUS PATIENT QL REPORTED: NORMAL
GLOBULIN SER CALC-MCNC: 2.4 G/DL (ref 1.9–3.5)
GLUCOSE SERPL-MCNC: 85 MG/DL (ref 65–99)
HBA1C MFR BLD: 5.1 % (ref 4–5.6)
HCT VFR BLD AUTO: 44.2 % (ref 37–47)
HDLC SERPL-MCNC: 56 MG/DL
HGB BLD-MCNC: 14.1 G/DL (ref 12–16)
IMM GRANULOCYTES # BLD AUTO: 0.02 K/UL (ref 0–0.11)
IMM GRANULOCYTES NFR BLD AUTO: 0.3 % (ref 0–0.9)
LDLC SERPL CALC-MCNC: 80 MG/DL
LYMPHOCYTES # BLD AUTO: 2.32 K/UL (ref 1–4.8)
LYMPHOCYTES NFR BLD: 30.4 % (ref 22–41)
MCH RBC QN AUTO: 30.3 PG (ref 27–33)
MCHC RBC AUTO-ENTMCNC: 31.9 G/DL (ref 33.6–35)
MCV RBC AUTO: 95.1 FL (ref 81.4–97.8)
MONOCYTES # BLD AUTO: 0.54 K/UL (ref 0–0.85)
MONOCYTES NFR BLD AUTO: 7.1 % (ref 0–13.4)
NEUTROPHILS # BLD AUTO: 4.51 K/UL (ref 2–7.15)
NEUTROPHILS NFR BLD: 58.9 % (ref 44–72)
NRBC # BLD AUTO: 0 K/UL
NRBC BLD-RTO: 0 /100 WBC
PLATELET # BLD AUTO: 326 K/UL (ref 164–446)
PMV BLD AUTO: 10.2 FL (ref 9–12.9)
POTASSIUM SERPL-SCNC: 4.3 MMOL/L (ref 3.6–5.5)
PROT SERPL-MCNC: 7.2 G/DL (ref 6–8.2)
RBC # BLD AUTO: 4.65 M/UL (ref 4.2–5.4)
SODIUM SERPL-SCNC: 137 MMOL/L (ref 135–145)
TRIGL SERPL-MCNC: 43 MG/DL (ref 0–149)
TSH SERPL DL<=0.005 MIU/L-ACNC: 2.63 UIU/ML (ref 0.38–5.33)
WBC # BLD AUTO: 7.6 K/UL (ref 4.8–10.8)

## 2021-12-02 PROCEDURE — 80053 COMPREHEN METABOLIC PANEL: CPT

## 2021-12-02 PROCEDURE — 84443 ASSAY THYROID STIM HORMONE: CPT

## 2021-12-02 PROCEDURE — 80061 LIPID PANEL: CPT

## 2021-12-02 PROCEDURE — 85025 COMPLETE CBC W/AUTO DIFF WBC: CPT

## 2021-12-02 PROCEDURE — 83036 HEMOGLOBIN GLYCOSYLATED A1C: CPT

## 2021-12-02 PROCEDURE — 36415 COLL VENOUS BLD VENIPUNCTURE: CPT

## 2022-01-03 ENCOUNTER — OFFICE VISIT (OUTPATIENT)
Dept: MEDICAL GROUP | Facility: PHYSICIAN GROUP | Age: 32
End: 2022-01-03
Payer: COMMERCIAL

## 2022-01-03 VITALS
TEMPERATURE: 96.4 F | OXYGEN SATURATION: 93 % | RESPIRATION RATE: 14 BRPM | HEIGHT: 67 IN | BODY MASS INDEX: 27.72 KG/M2 | DIASTOLIC BLOOD PRESSURE: 74 MMHG | SYSTOLIC BLOOD PRESSURE: 118 MMHG | WEIGHT: 176.6 LBS | HEART RATE: 78 BPM

## 2022-01-03 DIAGNOSIS — J30.2 SEASONAL ALLERGIES: ICD-10-CM

## 2022-01-03 DIAGNOSIS — R43.0 ANOSMIA: ICD-10-CM

## 2022-01-03 DIAGNOSIS — Z23 NEED FOR VACCINATION: ICD-10-CM

## 2022-01-03 DIAGNOSIS — Z76.89 ENCOUNTER TO ESTABLISH CARE: ICD-10-CM

## 2022-01-03 PROCEDURE — 90686 IIV4 VACC NO PRSV 0.5 ML IM: CPT | Performed by: NURSE PRACTITIONER

## 2022-01-03 PROCEDURE — 99214 OFFICE O/P EST MOD 30 MIN: CPT | Mod: 25 | Performed by: NURSE PRACTITIONER

## 2022-01-03 PROCEDURE — 90471 IMMUNIZATION ADMIN: CPT | Performed by: NURSE PRACTITIONER

## 2022-01-03 ASSESSMENT — PATIENT HEALTH QUESTIONNAIRE - PHQ9: CLINICAL INTERPRETATION OF PHQ2 SCORE: 0

## 2022-01-03 ASSESSMENT — FIBROSIS 4 INDEX: FIB4 SCORE: 0.5

## 2022-01-03 NOTE — ASSESSMENT & PLAN NOTE
Very pleasant 31-year-old female presents today to establish care, follow-up seasonal allergies, and review labs.  I have reviewed and updated her medical history, surgical history, family history, medications, allergies, and social determinants of health.    She is agreeable to receiving influenza vaccine today.  Order has been placed.  Patient is not currently Covid vaccinated.  She is aware the risk associated with not being vaccinated.

## 2022-01-03 NOTE — ASSESSMENT & PLAN NOTE
This is an improving condition.  Patient reports that she has been using Flonase and Zyrtec daily and has noticed a drastic improvement of her nasal congestion and ear fullness symptoms that she had in the past.  She denies any headaches, eye pain, ear fullness, nasal congestion, or sore throat.  Today she is reporting a 15-year history of anosmia I would like a referral to ENT.  I do feel this is appropriate referrals placed today.    Education was provided to patient about decreasing allergens in the home.  She will continue to take Zyrtec daily, Flonase, and saline sprays as needed.  Education was provided on how to use nasal spray.

## 2022-01-03 NOTE — PROGRESS NOTES
Chief Complaint   Patient presents with   • New Patient     Est care    • Lab Results       Subjective:     HPI:   Sierra Burroughs is a 31 y.o. female here to discuss the evaluation and management of:      Encounter to establish care  Very pleasant 31-year-old female presents today to establish care, follow-up seasonal allergies, and review labs.  I have reviewed and updated her medical history, surgical history, family history, medications, allergies, and social determinants of health.    She is agreeable to receiving influenza vaccine today.  Order has been placed.  Patient is not currently Covid vaccinated.  She is aware the risk associated with not being vaccinated.    Seasonal allergies  This is an improving condition.  Patient reports that she has been using Flonase and Zyrtec daily and has noticed a drastic improvement of her nasal congestion and ear fullness symptoms that she had in the past.  She denies any headaches, eye pain, ear fullness, nasal congestion, or sore throat.  Today she is reporting a 15-year history of anosmia I would like a referral to ENT.  I do feel this is appropriate referrals placed today.    Education was provided to patient about decreasing allergens in the home.  She will continue to take Zyrtec daily, Flonase, and saline sprays as needed.  Education was provided on how to use nasal spray.    Anosmia  This is a newly assessed condition.  Patient reports that she has had decreased smell for approximately 15 years.  She has recently started on daily antihistamines and Flonase but has not seen improvement in her lack of smell.   She reports that she does not have a total loss of smell just decreased smell especially with certain smells.  Patient is requesting referral to ENT.  Referral was placed today.          ROS:  Per HPI    No Known Allergies    Current medicines (including changes today)  Current Outpatient Medications   Medication Sig Dispense Refill   • cetirizine  (ZYRTEC) 10 MG Tab Take 1 Tablet by mouth every day. 90 Tablet 3   • fluticasone (FLONASE) 50 MCG/ACT nasal spray Administer 1 Spray into affected nostril(S) 2 times a day. 18.2 g 11   • Prenatal MV-Min-Fe Fum-FA-DHA (PRENATAL 1 PO) Take  by mouth.       No current facility-administered medications for this visit.       Social History     Tobacco Use   • Smoking status: Never Smoker   • Smokeless tobacco: Never Used   Vaping Use   • Vaping Use: Never used   Substance Use Topics   • Alcohol use: No   • Drug use: Not Currently     Types: Marijuana       Patient Active Problem List    Diagnosis Date Noted   • Encounter to establish care 01/03/2022   • Anosmia 01/03/2022   • Seasonal allergies 11/18/2021       Family History   Problem Relation Age of Onset   • Diabetes Mother    • Hypertension Mother    • Diabetes Father    • Hypertension Father    • No Known Problems Sister    • Diabetes Paternal Grandfather    • Hypertension Paternal Grandfather           Objective:     No visits with results within 1 Month(s) from this visit.   Latest known visit with results is:   Hospital Outpatient Visit on 12/02/2021   Component Date Value Ref Range Status   • TSH 12/02/2021 2.630  0.380 - 5.330 uIU/mL Final    Comment: Reference Range:    Pregnant Females, 1st Trimester  0.050-3.700  Pregnant Females, 2nd Trimester  0.310-4.350  Pregnant Females, 3rd Trimester  0.410-5.180     • Glycohemoglobin 12/02/2021 5.1  4.0 - 5.6 % Final    Comment: Increased risk for diabetes:  5.7 -6.4%  Diabetes:  >6.4%  Glycemic control for adults with diabetes:  <7.0%    The above interpretations are per ADA guidelines.  Diagnosis  of diabetes mellitus on the basis of elevated Hemoglobin A1c  should be confirmed by repeating the Hb A1c test.     • Est Avg Glucose 12/02/2021 100  mg/dL Final    Comment: The eAG calculation is based on the A1c-Derived Daily Glucose  (ADAG) study.  See the ADA's website for additional information.     • Cholesterol,Tot  12/02/2021 145  100 - 199 mg/dL Final   • Triglycerides 12/02/2021 43  0 - 149 mg/dL Final   • HDL 12/02/2021 56  >=40 mg/dL Final   • LDL 12/02/2021 80  <100 mg/dL Final   • Sodium 12/02/2021 137  135 - 145 mmol/L Final   • Potassium 12/02/2021 4.3  3.6 - 5.5 mmol/L Final   • Chloride 12/02/2021 101  96 - 112 mmol/L Final   • Co2 12/02/2021 27  20 - 33 mmol/L Final   • Anion Gap 12/02/2021 9.0  7.0 - 16.0 Final   • Glucose 12/02/2021 85  65 - 99 mg/dL Final   • Bun 12/02/2021 14  8 - 22 mg/dL Final   • Creatinine 12/02/2021 0.70  0.50 - 1.40 mg/dL Final   • Calcium 12/02/2021 9.3  8.5 - 10.5 mg/dL Final   • AST(SGOT) 12/02/2021 16  12 - 45 U/L Final   • ALT(SGPT) 12/02/2021 10  2 - 50 U/L Final   • Alkaline Phosphatase 12/02/2021 67  30 - 99 U/L Final   • Total Bilirubin 12/02/2021 0.7  0.1 - 1.5 mg/dL Final   • Albumin 12/02/2021 4.8  3.2 - 4.9 g/dL Final   • Total Protein 12/02/2021 7.2  6.0 - 8.2 g/dL Final   • Globulin 12/02/2021 2.4  1.9 - 3.5 g/dL Final   • A-G Ratio 12/02/2021 2.0  g/dL Final   • WBC 12/02/2021 7.6  4.8 - 10.8 K/uL Final   • RBC 12/02/2021 4.65  4.20 - 5.40 M/uL Final   • Hemoglobin 12/02/2021 14.1  12.0 - 16.0 g/dL Final   • Hematocrit 12/02/2021 44.2  37.0 - 47.0 % Final   • MCV 12/02/2021 95.1  81.4 - 97.8 fL Final   • MCH 12/02/2021 30.3  27.0 - 33.0 pg Final   • MCHC 12/02/2021 31.9* 33.6 - 35.0 g/dL Final   • RDW 12/02/2021 41.8  35.9 - 50.0 fL Final   • Platelet Count 12/02/2021 326  164 - 446 K/uL Final   • MPV 12/02/2021 10.2  9.0 - 12.9 fL Final   • Neutrophils-Polys 12/02/2021 58.90  44.00 - 72.00 % Final   • Lymphocytes 12/02/2021 30.40  22.00 - 41.00 % Final   • Monocytes 12/02/2021 7.10  0.00 - 13.40 % Final   • Eosinophils 12/02/2021 2.50  0.00 - 6.90 % Final   • Basophils 12/02/2021 0.80  0.00 - 1.80 % Final   • Immature Granulocytes 12/02/2021 0.30  0.00 - 0.90 % Final   • Nucleated RBC 12/02/2021 0.00  /100 WBC Final   • Neutrophils (Absolute) 12/02/2021 4.51  2.00 - 7.15  "K/uL Final    Includes immature neutrophils, if present.   • Lymphs (Absolute) 12/02/2021 2.32  1.00 - 4.80 K/uL Final   • Monos (Absolute) 12/02/2021 0.54  0.00 - 0.85 K/uL Final   • Eos (Absolute) 12/02/2021 0.19  0.00 - 0.51 K/uL Final   • Baso (Absolute) 12/02/2021 0.06  0.00 - 0.12 K/uL Final   • Immature Granulocytes (abs) 12/02/2021 0.02  0.00 - 0.11 K/uL Final   • NRBC (Absolute) 12/02/2021 0.00  K/uL Final   • Fasting Status 12/02/2021 Fasting   Final   • GFR If  12/02/2021 >60  >60 mL/min/1.73 m 2 Final   • GFR If Non  12/02/2021 >60  >60 mL/min/1.73 m 2 Final       /74 (BP Location: Right arm, Patient Position: Sitting, BP Cuff Size: Adult)   Pulse 78   Temp (!) 35.8 °C (96.4 °F) (Temporal)   Resp 14   Ht 1.702 m (5' 7\")   Wt 80.1 kg (176 lb 9.6 oz)   SpO2 93%  Body mass index is 27.66 kg/m².    Physical Exam:  Constitutional: Well-developed and well-nourished female in NAD. Not diaphoretic. No distress.   Skin: warm, dry, intact, no evidence of rash or concerning lesions  Head: Atraumatic without lesions.  Eyes: Conjunctivae and extraocular motions are normal. Pupils are equal, round, and reactive to light. No scleral icterus.   Ears:  External ears unremarkable. TMs normal; bilaterally  Nose: Bilateral nares erythematous with mild edema right greater than left.  Mouth/Throat: Tongue normal. Oropharynx is clear and moist. Posterior pharynx without erythema or exudates, cobblestone appearance.  Neck: Supple, trachea midline. No thyromegaly present. No cervical or supraclavicular lymphadenopathy.  Cardiovascular: Regular rate and rhythm without murmur. Radial pulses are intact and equal bilaterally.  Pulmonary: Clear to ausculation. Normal effort. No rales, ronchi, or wheezing..   Neurological: Alert and oriented x 3. No cranial nerve deficit. 5/5 myotomes. Sensation intact.   Psychiatric:  Behavior, mood, and affect are appropriate.    Assessment and Plan: "     The following treatment plan was discussed:  Reviewed most recent labs ordered by Mee Sherman with patient and answered all questions.    1. Encounter to establish care    2. Seasonal allergies  Advised nasal saline and steroid sprays daily.  Continue on OTC anti-histamines (Zyrtec) and Flonase as needed . Encouraged allergen avoidence and environment modification when possible.  - Referral to ENT    3. Anosmia  - Referral to ENT    4. Need for vaccination  - INFLUENZA VACCINE QUAD INJ (PF)      Any change or worsening of signs or symptoms, patient encouraged to follow-up or report to emergency room for further evaluation. Patient verbalizes understanding and agrees.    Follow-Up: Return in about 1 year (around 1/3/2023), or if symptoms worsen or fail to improve.      PLEASE NOTE: This dictation was created using voice recognition software. I have made every reasonable attempt to correct obvious errors, but I expect that there are errors of grammar and possibly content that I did not discover before finalizing the note.

## 2022-01-03 NOTE — PATIENT INSTRUCTIONS
Start taking over-the-counter vitamin D of 2000 to 5000 international units daily and calcium supplement of 1200 mg daily.    Allergies, Adult  An allergy means that your body reacts to something that bothers it (allergen). It is not a normal reaction. This can happen from something that you:  · Eat.  · Breathe in.  · Touch.  You can have an allergy (be allergic) to:  · Outdoor things, like:  ? Pollen.  ? Grass.  ? Weeds.  · Indoor things, like:  ? Dust.  ? Smoke.  ? Pet dander.  · Foods.  · Medicines.  · Things that bother your skin, like:  ? Detergents.  ? Chemicals.  ? Latex.  · Perfume.  · Bugs.  An allergy cannot spread from person to person (is not contagious).  Follow these instructions at home:              · Stay away from things that you know you are allergic to.  · If you have allergies to things in the air, wash out your nose each day. Do it with one of these:  ? A salt-water (saline) spray.  ? A container (neti pot).  · Take over-the-counter and prescription medicines only as told by your doctor.  · Keep all follow-up visits as told by your doctor. This is important.  · If you are at risk for a very bad allergy reaction (anaphylaxis), keep an auto-injector with you all the time. This is called an epinephrine injection.  ? This is pre-measured medicine with a needle. You can put it into your skin by yourself.  ? Right after you have a very bad allergy reaction, you or a person with you must give the medicine in less than a few minutes. This is an emergency.  · If you have ever had a very bad allergy reaction, wear a medical alert bracelet or necklace. Your very bad allergy should be written on it.  Contact a health care provider if:  · Your symptoms do not get better with treatment.  Get help right away if:  · You have symptoms of a very bad allergy reaction. These include:  ? A swollen mouth, tongue, or throat.  ? Pain or tightness in your chest.  ? Trouble breathing.  ? Being short of  breath.  ? Dizziness.  ? Fainting.  ? Very bad pain in your belly (abdomen).  ? Throwing up (vomiting).  ? Watery poop (diarrhea).  Summary  · An allergy means that your body reacts to something that bothers it (allergen). It is not a normal reaction.  · Stay away from things that make your body react.  · Take over-the-counter and prescription medicines only as told by your doctor.  · If you are at risk for a very bad allergy reaction, carry an auto-injector (epinephrine injection) all the time. Also, wear a medical alert bracelet or necklace so people know about your allergy.  This information is not intended to replace advice given to you by your health care provider. Make sure you discuss any questions you have with your health care provider.  Document Released: 04/14/2014 Document Revised: 04/07/2020 Document Reviewed: 04/02/2018  Elsevier Patient Education © 2020 [employer]

## 2022-01-03 NOTE — ASSESSMENT & PLAN NOTE
This is a newly assessed condition.  Patient reports that she has had decreased smell for approximately 15 years.  She has recently started on daily antihistamines and Flonase but has not seen improvement in her lack of smell.   She reports that she does not have a total loss of smell just decreased smell especially with certain smells.  Patient is requesting referral to ENT.  Referral was placed today.

## 2023-01-05 ENCOUNTER — APPOINTMENT (OUTPATIENT)
Dept: MEDICAL GROUP | Facility: PHYSICIAN GROUP | Age: 33
End: 2023-01-05
Payer: COMMERCIAL

## 2023-01-13 ENCOUNTER — APPOINTMENT (OUTPATIENT)
Dept: MEDICAL GROUP | Facility: PHYSICIAN GROUP | Age: 33
End: 2023-01-13
Payer: COMMERCIAL

## 2023-05-25 ENCOUNTER — OFFICE VISIT (OUTPATIENT)
Dept: MEDICAL GROUP | Facility: CLINIC | Age: 33
End: 2023-05-25
Payer: MEDICAID

## 2023-05-25 VITALS
DIASTOLIC BLOOD PRESSURE: 68 MMHG | BODY MASS INDEX: 29.44 KG/M2 | OXYGEN SATURATION: 99 % | SYSTOLIC BLOOD PRESSURE: 118 MMHG | HEART RATE: 52 BPM | TEMPERATURE: 97.5 F | WEIGHT: 187.6 LBS | RESPIRATION RATE: 20 BRPM | HEIGHT: 67 IN

## 2023-05-25 DIAGNOSIS — J30.2 SEASONAL ALLERGIES: ICD-10-CM

## 2023-05-25 DIAGNOSIS — Z00.00 HEALTHCARE MAINTENANCE: ICD-10-CM

## 2023-05-25 DIAGNOSIS — Z23 NEED FOR VACCINATION: ICD-10-CM

## 2023-05-25 DIAGNOSIS — K42.9 UMBILICAL HERNIA WITHOUT OBSTRUCTION AND WITHOUT GANGRENE: ICD-10-CM

## 2023-05-25 PROCEDURE — 90746 HEPB VACCINE 3 DOSE ADULT IM: CPT | Performed by: PHYSICIAN ASSISTANT

## 2023-05-25 PROCEDURE — 3078F DIAST BP <80 MM HG: CPT | Performed by: PHYSICIAN ASSISTANT

## 2023-05-25 PROCEDURE — 99214 OFFICE O/P EST MOD 30 MIN: CPT | Mod: 25 | Performed by: PHYSICIAN ASSISTANT

## 2023-05-25 PROCEDURE — 90471 IMMUNIZATION ADMIN: CPT | Performed by: PHYSICIAN ASSISTANT

## 2023-05-25 PROCEDURE — 3074F SYST BP LT 130 MM HG: CPT | Performed by: PHYSICIAN ASSISTANT

## 2023-05-25 RX ORDER — MONTELUKAST SODIUM 10 MG/1
10 TABLET ORAL DAILY
Qty: 90 TABLET | Refills: 1 | Status: SHIPPED | OUTPATIENT
Start: 2023-05-25 | End: 2023-12-30

## 2023-05-25 ASSESSMENT — FIBROSIS 4 INDEX: FIB4 SCORE: 0.53

## 2023-05-25 ASSESSMENT — PATIENT HEALTH QUESTIONNAIRE - PHQ9: CLINICAL INTERPRETATION OF PHQ2 SCORE: 0

## 2023-05-25 NOTE — PROGRESS NOTES
"cc:  hernia    Subjective:     Sierra Burroughs is a 33 y.o. female presenting for hernia      Patient presents to the office for hernia.  Patient has been having an umbilical hernia flare that has caused a lot of pain.  She would like to have this repaired.   It can be worse when she is carrying her daughter.      Patient would like to try a medication for allergies.     Review of systems:  See above.   Denies any symptoms unless previously indicated.        Current Outpatient Medications:     montelukast (SINGULAIR) 10 MG Tab, Take 1 Tablet by mouth every day., Disp: 90 Tablet, Rfl: 1    cetirizine (ZYRTEC) 10 MG Tab, Take 1 Tablet by mouth every day., Disp: 90 Tablet, Rfl: 3    fluticasone (FLONASE) 50 MCG/ACT nasal spray, Administer 1 Spray into affected nostril(S) 2 times a day., Disp: 18.2 g, Rfl: 11    Prenatal MV-Min-Fe Fum-FA-DHA (PRENATAL 1 PO), Take  by mouth. (Patient not taking: Reported on 5/25/2023), Disp: , Rfl:     Allergies, past medical history, past surgical history, family history, social history reviewed and updated    Objective:     Vitals: /68 (BP Location: Left arm, Patient Position: Sitting, BP Cuff Size: Adult)   Pulse (!) 52   Temp 36.4 °C (97.5 °F) (Temporal)   Resp 20   Ht 1.702 m (5' 7\")   Wt 85.1 kg (187 lb 9.6 oz)   LMP 05/15/2023 (Approximate)   SpO2 99%   BMI 29.38 kg/m²   General: Alert, pleasant, NAD  EYES:   PERRL, EOMI, no icterus or pallor.  Conjunctivae and lids normal.   HENT:  Normocephalic.  External ears normal.  Neck supple.     Respiratory: Normal respiratory effort.    Abdomen: Not obese.  Umbilical hernia present  Skin: Warm, dry, no rashes.  Musculoskeletal: Gait is normal.  Moves all extremities well.    Extremities: normal range of motion all extremities.   Neurological: No tremors, sensation grossly intact,  CN2-12 intact.  Psych:  Affect/mood is normal, judgement is good, memory is intact, grooming is appropriate.    Assessment/Plan: "     Sierra was seen today for establish care.    Diagnoses and all orders for this visit:    Umbilical hernia without obstruction and without gangrene  -     Referral to General Surgery    Palpable hernia that is reducible.  We will submit a referral to general surgery for further evaluation.    Seasonal allergies  -     montelukast (SINGULAIR) 10 MG Tab; Take 1 Tablet by mouth every day.    We will try patient on Singulair.  Side effects discussed with patient.    Healthcare maintenance  -     TSH WITH REFLEX TO FT4; Future  -     VITAMIN D,25 HYDROXY (DEFICIENCY); Future  -     Comp Metabolic Panel; Future  -     Lipid Profile; Future  -     HEP C VIRUS ANTIBODY; Future    Routine labs ordered.  Last set of labs were normal.  If normal, will notify patient.    Need for vaccination  -     Hepatitis B Vaccine Adult 20+    Hep B given today.        No follow-ups on file.    Please note that this dictation was created using voice recognition software. I have made every reasonable attempt to correct obvious errors, but expect that there are errors of grammar and possible content that I did not discover before finalizing note.

## 2023-05-26 ENCOUNTER — HOSPITAL ENCOUNTER (OUTPATIENT)
Dept: LAB | Facility: MEDICAL CENTER | Age: 33
End: 2023-05-26
Attending: PHYSICIAN ASSISTANT
Payer: MEDICAID

## 2023-05-26 DIAGNOSIS — Z00.00 HEALTHCARE MAINTENANCE: ICD-10-CM

## 2023-05-26 LAB
ALBUMIN SERPL BCP-MCNC: 4.2 G/DL (ref 3.2–4.9)
ALBUMIN/GLOB SERPL: 1.4 G/DL
ALP SERPL-CCNC: 47 U/L (ref 30–99)
ALT SERPL-CCNC: 16 U/L (ref 2–50)
ANION GAP SERPL CALC-SCNC: 12 MMOL/L (ref 7–16)
AST SERPL-CCNC: 17 U/L (ref 12–45)
BILIRUB SERPL-MCNC: 0.6 MG/DL (ref 0.1–1.5)
BUN SERPL-MCNC: 14 MG/DL (ref 8–22)
CALCIUM ALBUM COR SERPL-MCNC: 8.6 MG/DL (ref 8.5–10.5)
CALCIUM SERPL-MCNC: 8.8 MG/DL (ref 8.5–10.5)
CHLORIDE SERPL-SCNC: 102 MMOL/L (ref 96–112)
CHOLEST SERPL-MCNC: 153 MG/DL (ref 100–199)
CO2 SERPL-SCNC: 24 MMOL/L (ref 20–33)
CREAT SERPL-MCNC: 0.76 MG/DL (ref 0.5–1.4)
FASTING STATUS PATIENT QL REPORTED: NORMAL
GFR SERPLBLD CREATININE-BSD FMLA CKD-EPI: 105 ML/MIN/1.73 M 2
GLOBULIN SER CALC-MCNC: 3 G/DL (ref 1.9–3.5)
GLUCOSE SERPL-MCNC: 89 MG/DL (ref 65–99)
HDLC SERPL-MCNC: 45 MG/DL
LDLC SERPL CALC-MCNC: 90 MG/DL
POTASSIUM SERPL-SCNC: 4.2 MMOL/L (ref 3.6–5.5)
PROT SERPL-MCNC: 7.2 G/DL (ref 6–8.2)
SODIUM SERPL-SCNC: 138 MMOL/L (ref 135–145)
TRIGL SERPL-MCNC: 88 MG/DL (ref 0–149)

## 2023-05-26 PROCEDURE — 36415 COLL VENOUS BLD VENIPUNCTURE: CPT

## 2023-05-26 PROCEDURE — 80061 LIPID PANEL: CPT

## 2023-05-26 PROCEDURE — 84443 ASSAY THYROID STIM HORMONE: CPT

## 2023-05-26 PROCEDURE — 80053 COMPREHEN METABOLIC PANEL: CPT

## 2023-05-26 PROCEDURE — 82306 VITAMIN D 25 HYDROXY: CPT

## 2023-05-26 PROCEDURE — 86803 HEPATITIS C AB TEST: CPT

## 2023-05-27 LAB
25(OH)D3 SERPL-MCNC: 28 NG/ML (ref 30–100)
HCV AB SER QL: NORMAL
TSH SERPL DL<=0.005 MIU/L-ACNC: 3 UIU/ML (ref 0.38–5.33)

## 2023-08-15 ENCOUNTER — PRE-ADMISSION TESTING (OUTPATIENT)
Dept: ADMISSIONS | Facility: MEDICAL CENTER | Age: 33
End: 2023-08-15
Attending: COLON & RECTAL SURGERY
Payer: MEDICAID

## 2023-08-15 RX ORDER — CALCIUM CARB/VITAMIN D3/VIT K1 650MG-12.5
TABLET,CHEWABLE ORAL
COMMUNITY
End: 2023-12-30

## 2023-08-17 ENCOUNTER — ANESTHESIA EVENT (OUTPATIENT)
Dept: SURGERY | Facility: MEDICAL CENTER | Age: 33
End: 2023-08-17
Payer: MEDICAID

## 2023-08-18 ENCOUNTER — TELEPHONE (OUTPATIENT)
Dept: HOSPITALIST | Facility: MEDICAL CENTER | Age: 33
End: 2023-08-18
Payer: MEDICAID

## 2023-08-18 ENCOUNTER — HOSPITAL ENCOUNTER (OUTPATIENT)
Facility: MEDICAL CENTER | Age: 33
End: 2023-08-18
Attending: COLON & RECTAL SURGERY | Admitting: COLON & RECTAL SURGERY
Payer: MEDICAID

## 2023-08-18 ENCOUNTER — ANESTHESIA (OUTPATIENT)
Dept: SURGERY | Facility: MEDICAL CENTER | Age: 33
End: 2023-08-18
Payer: MEDICAID

## 2023-08-18 VITALS
DIASTOLIC BLOOD PRESSURE: 76 MMHG | WEIGHT: 196.43 LBS | BODY MASS INDEX: 30.83 KG/M2 | HEART RATE: 55 BPM | RESPIRATION RATE: 12 BRPM | HEIGHT: 67 IN | TEMPERATURE: 96.5 F | SYSTOLIC BLOOD PRESSURE: 122 MMHG | OXYGEN SATURATION: 93 %

## 2023-08-18 DIAGNOSIS — G89.18 POSTOPERATIVE PAIN: ICD-10-CM

## 2023-08-18 LAB
HCG UR QL: NEGATIVE
PATHOLOGY CONSULT NOTE: NORMAL

## 2023-08-18 PROCEDURE — 160046 HCHG PACU - 1ST 60 MINS PHASE II: Performed by: COLON & RECTAL SURGERY

## 2023-08-18 PROCEDURE — 700105 HCHG RX REV CODE 258: Mod: JZ,UD | Performed by: COLON & RECTAL SURGERY

## 2023-08-18 PROCEDURE — 88302 TISSUE EXAM BY PATHOLOGIST: CPT

## 2023-08-18 PROCEDURE — 700111 HCHG RX REV CODE 636 W/ 250 OVERRIDE (IP): Mod: UD | Performed by: STUDENT IN AN ORGANIZED HEALTH CARE EDUCATION/TRAINING PROGRAM

## 2023-08-18 PROCEDURE — 160028 HCHG SURGERY MINUTES - 1ST 30 MINS LEVEL 3: Performed by: COLON & RECTAL SURGERY

## 2023-08-18 PROCEDURE — C1781 MESH (IMPLANTABLE): HCPCS | Performed by: COLON & RECTAL SURGERY

## 2023-08-18 PROCEDURE — 160002 HCHG RECOVERY MINUTES (STAT): Performed by: COLON & RECTAL SURGERY

## 2023-08-18 PROCEDURE — 160036 HCHG PACU - EA ADDL 30 MINS PHASE I: Performed by: COLON & RECTAL SURGERY

## 2023-08-18 PROCEDURE — 160035 HCHG PACU - 1ST 60 MINS PHASE I: Performed by: COLON & RECTAL SURGERY

## 2023-08-18 PROCEDURE — A9270 NON-COVERED ITEM OR SERVICE: HCPCS | Mod: UD | Performed by: STUDENT IN AN ORGANIZED HEALTH CARE EDUCATION/TRAINING PROGRAM

## 2023-08-18 PROCEDURE — 700101 HCHG RX REV CODE 250: Mod: UD | Performed by: COLON & RECTAL SURGERY

## 2023-08-18 PROCEDURE — 160039 HCHG SURGERY MINUTES - EA ADDL 1 MIN LEVEL 3: Performed by: COLON & RECTAL SURGERY

## 2023-08-18 PROCEDURE — 700102 HCHG RX REV CODE 250 W/ 637 OVERRIDE(OP): Mod: UD | Performed by: STUDENT IN AN ORGANIZED HEALTH CARE EDUCATION/TRAINING PROGRAM

## 2023-08-18 PROCEDURE — 160009 HCHG ANES TIME/MIN: Performed by: COLON & RECTAL SURGERY

## 2023-08-18 PROCEDURE — 160048 HCHG OR STATISTICAL LEVEL 1-5: Performed by: COLON & RECTAL SURGERY

## 2023-08-18 PROCEDURE — 160025 RECOVERY II MINUTES (STATS): Performed by: COLON & RECTAL SURGERY

## 2023-08-18 PROCEDURE — C1713 ANCHOR/SCREW BN/BN,TIS/BN: HCPCS | Performed by: COLON & RECTAL SURGERY

## 2023-08-18 PROCEDURE — 160047 HCHG PACU  - EA ADDL 30 MINS PHASE II: Performed by: COLON & RECTAL SURGERY

## 2023-08-18 PROCEDURE — 700101 HCHG RX REV CODE 250: Mod: UD | Performed by: STUDENT IN AN ORGANIZED HEALTH CARE EDUCATION/TRAINING PROGRAM

## 2023-08-18 PROCEDURE — 81025 URINE PREGNANCY TEST: CPT

## 2023-08-18 DEVICE — MESH VENTRALIGHT 4 X 6 INCH - (1EA/CA): Type: IMPLANTABLE DEVICE | Status: FUNCTIONAL

## 2023-08-18 RX ORDER — DEXAMETHASONE SODIUM PHOSPHATE 4 MG/ML
INJECTION, SOLUTION INTRA-ARTICULAR; INTRALESIONAL; INTRAMUSCULAR; INTRAVENOUS; SOFT TISSUE PRN
Status: DISCONTINUED | OUTPATIENT
Start: 2023-08-18 | End: 2023-08-18 | Stop reason: SURG

## 2023-08-18 RX ORDER — KETOROLAC TROMETHAMINE 30 MG/ML
INJECTION, SOLUTION INTRAMUSCULAR; INTRAVENOUS PRN
Status: DISCONTINUED | OUTPATIENT
Start: 2023-08-18 | End: 2023-08-18 | Stop reason: SURG

## 2023-08-18 RX ORDER — SODIUM CHLORIDE, SODIUM LACTATE, POTASSIUM CHLORIDE, CALCIUM CHLORIDE 600; 310; 30; 20 MG/100ML; MG/100ML; MG/100ML; MG/100ML
INJECTION, SOLUTION INTRAVENOUS CONTINUOUS
Status: DISCONTINUED | OUTPATIENT
Start: 2023-08-18 | End: 2023-08-18 | Stop reason: HOSPADM

## 2023-08-18 RX ORDER — MIDAZOLAM HYDROCHLORIDE 1 MG/ML
INJECTION INTRAMUSCULAR; INTRAVENOUS PRN
Status: DISCONTINUED | OUTPATIENT
Start: 2023-08-18 | End: 2023-08-18 | Stop reason: SURG

## 2023-08-18 RX ORDER — HYDROMORPHONE HYDROCHLORIDE 1 MG/ML
0.1 INJECTION, SOLUTION INTRAMUSCULAR; INTRAVENOUS; SUBCUTANEOUS
Status: DISCONTINUED | OUTPATIENT
Start: 2023-08-18 | End: 2023-08-18 | Stop reason: HOSPADM

## 2023-08-18 RX ORDER — EPHEDRINE SULFATE 50 MG/ML
5 INJECTION, SOLUTION INTRAVENOUS
Status: DISCONTINUED | OUTPATIENT
Start: 2023-08-18 | End: 2023-08-18 | Stop reason: HOSPADM

## 2023-08-18 RX ORDER — OXYCODONE HCL 5 MG/5 ML
10 SOLUTION, ORAL ORAL
Status: COMPLETED | OUTPATIENT
Start: 2023-08-18 | End: 2023-08-18

## 2023-08-18 RX ORDER — LIDOCAINE HYDROCHLORIDE 10 MG/ML
INJECTION, SOLUTION EPIDURAL; INFILTRATION; INTRACAUDAL; PERINEURAL PRN
Status: DISCONTINUED | OUTPATIENT
Start: 2023-08-18 | End: 2023-08-18 | Stop reason: SURG

## 2023-08-18 RX ORDER — ONDANSETRON 2 MG/ML
4 INJECTION INTRAMUSCULAR; INTRAVENOUS
Status: COMPLETED | OUTPATIENT
Start: 2023-08-18 | End: 2023-08-18

## 2023-08-18 RX ORDER — BUPIVACAINE HYDROCHLORIDE AND EPINEPHRINE 5; 5 MG/ML; UG/ML
INJECTION, SOLUTION EPIDURAL; INTRACAUDAL; PERINEURAL
Status: DISCONTINUED | OUTPATIENT
Start: 2023-08-18 | End: 2023-08-18 | Stop reason: HOSPADM

## 2023-08-18 RX ORDER — HYDROMORPHONE HYDROCHLORIDE 1 MG/ML
0.4 INJECTION, SOLUTION INTRAMUSCULAR; INTRAVENOUS; SUBCUTANEOUS
Status: DISCONTINUED | OUTPATIENT
Start: 2023-08-18 | End: 2023-08-18 | Stop reason: HOSPADM

## 2023-08-18 RX ORDER — HYDROMORPHONE HYDROCHLORIDE 1 MG/ML
0.2 INJECTION, SOLUTION INTRAMUSCULAR; INTRAVENOUS; SUBCUTANEOUS
Status: DISCONTINUED | OUTPATIENT
Start: 2023-08-18 | End: 2023-08-18 | Stop reason: HOSPADM

## 2023-08-18 RX ORDER — ONDANSETRON 2 MG/ML
INJECTION INTRAMUSCULAR; INTRAVENOUS PRN
Status: DISCONTINUED | OUTPATIENT
Start: 2023-08-18 | End: 2023-08-18 | Stop reason: SURG

## 2023-08-18 RX ORDER — HALOPERIDOL 5 MG/ML
1 INJECTION INTRAMUSCULAR
Status: DISCONTINUED | OUTPATIENT
Start: 2023-08-18 | End: 2023-08-18 | Stop reason: HOSPADM

## 2023-08-18 RX ORDER — SUCCINYLCHOLINE CHLORIDE 20 MG/ML
INJECTION INTRAMUSCULAR; INTRAVENOUS PRN
Status: DISCONTINUED | OUTPATIENT
Start: 2023-08-18 | End: 2023-08-18 | Stop reason: SURG

## 2023-08-18 RX ORDER — DOXYCYCLINE 100 MG/1
100 CAPSULE ORAL 2 TIMES DAILY
Qty: 20 CAPSULE | Refills: 0 | Status: SHIPPED | OUTPATIENT
Start: 2023-08-18 | End: 2023-08-28

## 2023-08-18 RX ORDER — LABETALOL HYDROCHLORIDE 5 MG/ML
5 INJECTION, SOLUTION INTRAVENOUS
Status: DISCONTINUED | OUTPATIENT
Start: 2023-08-18 | End: 2023-08-18 | Stop reason: HOSPADM

## 2023-08-18 RX ORDER — ROCURONIUM BROMIDE 10 MG/ML
INJECTION, SOLUTION INTRAVENOUS PRN
Status: DISCONTINUED | OUTPATIENT
Start: 2023-08-18 | End: 2023-08-18 | Stop reason: SURG

## 2023-08-18 RX ORDER — LIDOCAINE HYDROCHLORIDE 40 MG/ML
SOLUTION TOPICAL PRN
Status: DISCONTINUED | OUTPATIENT
Start: 2023-08-18 | End: 2023-08-18 | Stop reason: SURG

## 2023-08-18 RX ORDER — HYDRALAZINE HYDROCHLORIDE 20 MG/ML
5 INJECTION INTRAMUSCULAR; INTRAVENOUS
Status: DISCONTINUED | OUTPATIENT
Start: 2023-08-18 | End: 2023-08-18 | Stop reason: HOSPADM

## 2023-08-18 RX ORDER — DIPHENHYDRAMINE HYDROCHLORIDE 50 MG/ML
12.5 INJECTION INTRAMUSCULAR; INTRAVENOUS
Status: DISCONTINUED | OUTPATIENT
Start: 2023-08-18 | End: 2023-08-18 | Stop reason: HOSPADM

## 2023-08-18 RX ORDER — OXYCODONE HYDROCHLORIDE 5 MG/1
5 TABLET ORAL EVERY 6 HOURS PRN
Qty: 20 TABLET | Refills: 0 | Status: SHIPPED | OUTPATIENT
Start: 2023-08-18 | End: 2023-08-23

## 2023-08-18 RX ORDER — METOPROLOL TARTRATE 1 MG/ML
1 INJECTION, SOLUTION INTRAVENOUS
Status: DISCONTINUED | OUTPATIENT
Start: 2023-08-18 | End: 2023-08-18 | Stop reason: HOSPADM

## 2023-08-18 RX ORDER — OXYCODONE HCL 5 MG/5 ML
5 SOLUTION, ORAL ORAL
Status: COMPLETED | OUTPATIENT
Start: 2023-08-18 | End: 2023-08-18

## 2023-08-18 RX ORDER — CEFAZOLIN SODIUM 1 G/3ML
INJECTION, POWDER, FOR SOLUTION INTRAMUSCULAR; INTRAVENOUS PRN
Status: DISCONTINUED | OUTPATIENT
Start: 2023-08-18 | End: 2023-08-18 | Stop reason: SURG

## 2023-08-18 RX ORDER — ACETAMINOPHEN 500 MG
1000 TABLET ORAL ONCE
Status: COMPLETED | OUTPATIENT
Start: 2023-08-18 | End: 2023-08-18

## 2023-08-18 RX ORDER — MEPERIDINE HYDROCHLORIDE 25 MG/ML
6.25 INJECTION INTRAMUSCULAR; INTRAVENOUS; SUBCUTANEOUS
Status: DISCONTINUED | OUTPATIENT
Start: 2023-08-18 | End: 2023-08-18 | Stop reason: HOSPADM

## 2023-08-18 RX ADMIN — LIDOCAINE HYDROCHLORIDE 40 MG: 10 INJECTION, SOLUTION EPIDURAL; INFILTRATION; INTRACAUDAL at 09:01

## 2023-08-18 RX ADMIN — LIDOCAINE HYDROCHLORIDE 4 ML: 40 SOLUTION TOPICAL at 09:03

## 2023-08-18 RX ADMIN — HYDROMORPHONE HYDROCHLORIDE 0.2 MG: 1 INJECTION, SOLUTION INTRAMUSCULAR; INTRAVENOUS; SUBCUTANEOUS at 10:46

## 2023-08-18 RX ADMIN — ROCURONIUM BROMIDE 10 MG: 50 INJECTION, SOLUTION INTRAVENOUS at 09:29

## 2023-08-18 RX ADMIN — OXYCODONE HYDROCHLORIDE 5 MG: 5 SOLUTION ORAL at 10:17

## 2023-08-18 RX ADMIN — FENTANYL CITRATE 25 MCG: 50 INJECTION, SOLUTION INTRAMUSCULAR; INTRAVENOUS at 10:35

## 2023-08-18 RX ADMIN — SODIUM CHLORIDE, POTASSIUM CHLORIDE, SODIUM LACTATE AND CALCIUM CHLORIDE: 600; 310; 30; 20 INJECTION, SOLUTION INTRAVENOUS at 08:04

## 2023-08-18 RX ADMIN — PROPOFOL 200 MG: 10 INJECTION, EMULSION INTRAVENOUS at 09:01

## 2023-08-18 RX ADMIN — FENTANYL CITRATE 50 MCG: 50 INJECTION, SOLUTION INTRAMUSCULAR; INTRAVENOUS at 09:40

## 2023-08-18 RX ADMIN — ROCURONIUM BROMIDE 10 MG: 50 INJECTION, SOLUTION INTRAVENOUS at 09:01

## 2023-08-18 RX ADMIN — FENTANYL CITRATE 25 MCG: 50 INJECTION, SOLUTION INTRAMUSCULAR; INTRAVENOUS at 10:30

## 2023-08-18 RX ADMIN — ACETAMINOPHEN 1000 MG: 500 TABLET, FILM COATED ORAL at 08:05

## 2023-08-18 RX ADMIN — ROCURONIUM BROMIDE 10 MG: 50 INJECTION, SOLUTION INTRAVENOUS at 09:09

## 2023-08-18 RX ADMIN — ONDANSETRON 4 MG: 2 INJECTION INTRAMUSCULAR; INTRAVENOUS at 11:42

## 2023-08-18 RX ADMIN — ONDANSETRON 4 MG: 2 INJECTION INTRAMUSCULAR; INTRAVENOUS at 09:39

## 2023-08-18 RX ADMIN — MIDAZOLAM 2 MG: 1 INJECTION, SOLUTION INTRAMUSCULAR; INTRAVENOUS at 08:57

## 2023-08-18 RX ADMIN — KETOROLAC TROMETHAMINE 30 MG: 30 INJECTION, SOLUTION INTRAMUSCULAR; INTRAVENOUS at 09:39

## 2023-08-18 RX ADMIN — ROCURONIUM BROMIDE 10 MG: 50 INJECTION, SOLUTION INTRAVENOUS at 09:08

## 2023-08-18 RX ADMIN — FENTANYL CITRATE 50 MCG: 50 INJECTION, SOLUTION INTRAMUSCULAR; INTRAVENOUS at 09:01

## 2023-08-18 RX ADMIN — HYDROMORPHONE HYDROCHLORIDE 0.2 MG: 1 INJECTION, SOLUTION INTRAMUSCULAR; INTRAVENOUS; SUBCUTANEOUS at 10:59

## 2023-08-18 RX ADMIN — FENTANYL CITRATE 25 MCG: 50 INJECTION, SOLUTION INTRAMUSCULAR; INTRAVENOUS at 10:18

## 2023-08-18 RX ADMIN — FENTANYL CITRATE 50 MCG: 50 INJECTION, SOLUTION INTRAMUSCULAR; INTRAVENOUS at 09:18

## 2023-08-18 RX ADMIN — FENTANYL CITRATE 25 MCG: 50 INJECTION, SOLUTION INTRAMUSCULAR; INTRAVENOUS at 10:25

## 2023-08-18 RX ADMIN — CEFAZOLIN 2 G: 1 INJECTION, POWDER, FOR SOLUTION INTRAMUSCULAR; INTRAVENOUS at 09:09

## 2023-08-18 RX ADMIN — SUCCINYLCHOLINE CHLORIDE 140 MG: 20 INJECTION, SOLUTION INTRAMUSCULAR; INTRAVENOUS at 09:02

## 2023-08-18 RX ADMIN — SUGAMMADEX 200 MG: 100 INJECTION, SOLUTION INTRAVENOUS at 09:37

## 2023-08-18 RX ADMIN — FENTANYL CITRATE 50 MCG: 50 INJECTION, SOLUTION INTRAMUSCULAR; INTRAVENOUS at 09:22

## 2023-08-18 RX ADMIN — HYDROMORPHONE HYDROCHLORIDE 0.2 MG: 1 INJECTION, SOLUTION INTRAMUSCULAR; INTRAVENOUS; SUBCUTANEOUS at 10:40

## 2023-08-18 RX ADMIN — DEXAMETHASONE SODIUM PHOSPHATE 8 MG: 4 INJECTION INTRA-ARTICULAR; INTRALESIONAL; INTRAMUSCULAR; INTRAVENOUS; SOFT TISSUE at 09:08

## 2023-08-18 RX ADMIN — FENTANYL CITRATE 50 MCG: 50 INJECTION, SOLUTION INTRAMUSCULAR; INTRAVENOUS at 09:36

## 2023-08-18 RX ADMIN — HYDROMORPHONE HYDROCHLORIDE 0.2 MG: 1 INJECTION, SOLUTION INTRAMUSCULAR; INTRAVENOUS; SUBCUTANEOUS at 10:52

## 2023-08-18 ASSESSMENT — PAIN DESCRIPTION - PAIN TYPE
TYPE: ACUTE PAIN;SURGICAL PAIN
TYPE: ACUTE PAIN
TYPE: ACUTE PAIN;SURGICAL PAIN
TYPE: ACUTE PAIN;SURGICAL PAIN

## 2023-08-18 ASSESSMENT — FIBROSIS 4 INDEX: FIB4 SCORE: 0.44

## 2023-08-18 NOTE — DISCHARGE INSTRUCTIONS
HOME CARE INSTRUCTIONS    ACTIVITY: Rest and take it easy for the first 24 hours.  A responsible adult is recommended to remain with you during that time.  It is normal to feel sleepy.  We encourage you to not do anything that requires balance, judgment or coordination.    FOR 24 HOURS DO NOT:  Drive, operate machinery or run household appliances.  Drink beer or alcoholic beverages.  Make important decisions or sign legal documents.    SPECIAL INSTRUCTIONS:   Hernia Repair Discharge Instructions:    1. ACTIVITIES: Upon discharge from the hospital, the day of surgery it is requested that you do no significant physical activity and limit mental activities, as you have had sedation. The day after surgery, you may resume activities of daily living, but for 6 weeks, it is recommended that you do no strenuous activities or heavy lifting (greater than 20 pounds).     2. DRIVING: You may drive whenever you are off pain medications and are able to perform the activities needed to drive, i.e. turning, bending, twisting, etc.     3. WOUND: It is not unusual for patients to experience swelling and even bruising at the hernia repair site. Continue to wear abdominal binder until your post op appointment    4. ICE: please use ice on the wound to decrease the swelling for the first 24 hours and then discontinue.     5. BATHING: The dressing can be removed two days after surgery. You should leave the steri-strips in place, they will fall off over 5-7 days. You may then allow the wound to get wet in a shower 2 days after surgery, but avoid submersion in water (tub bath) for at least a week.    6. PAIN MEDICATION: You will be given a prescription for pain medication at discharge. Please take these as directed. It is important to remember not to take medications on an empty stomach as this may cause nausea.     7. BOWEL FUNCTION: After hernia repair, it is not uncommon for patients to experience constipation. This is due to decreasing  activity levels as well as pain medications. You may wish to use a stool softener beginning immediately after surgery, and you may or may not need to use a laxative (Miralax, Milk of Magnesia, Senokot, etc.) as well.     8. CALL IF YOU HAVE: (1) Fevers to more than 101.00 F, (2) Unusual chest or leg pain, (3) Drainage or fluid from incision that may be foul smelling, increased tenderness or soreness at the wound or the wound edges are no longer together, redness or swelling at the incision site. Please do not hesitate to call with any other questions.     9. APPOINTMENT: Contact our office at [phone] for a follow-up appointment in 1 to 2 weeks following your procedure.     If you have any additional questions, please do not hesitate to call the office and speak to either a medical assistant or the provider on call.     Office address:  [address] [place] [address], [address] [place] [id number]    Nando Barnes M.D.  [facility]  [phone]    DIET: To avoid nausea, slowly advance diet as tolerated, avoiding spicy or greasy foods for the first day.  Add more substantial food to your diet according to your physician's instructions.  Babies can be fed formula or breast milk as soon as they are hungry.  INCREASE FLUIDS AND FIBER TO AVOID CONSTIPATION.    MEDICATIONS: Resume taking daily medication.  Take prescribed pain medication with food.  If no medication is prescribed, you may take non-aspirin pain medication if needed.  PAIN MEDICATION CAN BE VERY CONSTIPATING.  Take a stool softener or laxative such as senokot, pericolace, or milk of magnesia if needed.    Last pain medication given at Tylenol at 8:05 and Oxycodone at 10:17.    A follow-up appointment should be arranged with your doctor in 1-2 weeks; call to schedule.    You should CALL YOUR PHYSICIAN if you develop:  Fever greater than 101 degrees F.  Pain not relieved by medication, or persistent nausea or vomiting.  Excessive bleeding (blood soaking through  dressing) or unexpected drainage from the wound.  Extreme redness or swelling around the incision site, drainage of pus or foul smelling drainage.  Inability to urinate or empty your bladder within 8 hours.  Problems with breathing or chest pain.    You should call 911 if you develop problems with breathing or chest pain.  If you are unable to contact your doctor or surgical center, you should go to the nearest emergency room or urgent care center.  Physician's telephone #: [phone]    MILD FLU-LIKE SYMPTOMS ARE NORMAL.  YOU MAY EXPERIENCE GENERALIZED MUSCLE ACHES, THROAT IRRITATION, HEADACHE AND/OR SOME NAUSEA.    If any questions arise, call your doctor.  If your doctor is not available, please feel free to call the Surgical Center at [phone].  The Center is open Monday through Friday from 7AM to 7PM.      A registered nurse may call you a few days after your surgery to see how you are doing after your procedure.    You may also receive a survey in the mail within the next two weeks and we ask that you take a few moments to complete the survey and return it to us.  Our goal is to provide you with very good care and we value your comments.     Depression / Suicide Risk    As you are discharged from this [facility] facility, it is important to learn how to keep safe from harming yourself.    Recognize the warning signs:  Abrupt changes in personality, positive or negative- including increase in energy   Giving away possessions  Change in eating patterns- significant weight changes-  positive or negative  Change in sleeping patterns- unable to sleep or sleeping all the time   Unwillingness or inability to communicate  Depression  Unusual sadness, discouragement and loneliness  Talk of wanting to die  Neglect of personal appearance   Rebelliousness- reckless behavior  Withdrawal from people/activities they love  Confusion- inability to concentrate     If you or a loved one observes any of these behaviors  or has concerns about self-harm, here's what you can do:  Talk about it- your feelings and reasons for harming yourself  Remove any means that you might use to hurt yourself (examples: pills, rope, extension cords, firearm)  Get professional help from the community (Mental Health, Substance Abuse, psychological counseling)  Do not be alone:Call your Safe Contact- someone whom you trust who will be there for you.  Call your local CRISIS HOTLINE 978-2801 or 954-113-0889  Call your local Children's Mobile Crisis Response Team [place] (919) 863-9149 or www.PinMyPet  Call the toll free National Suicide Prevention Hotlines   National Suicide Prevention Lifeline 471-769-VSHW (8891)  National Hope Line Network 800-SUICIDE (719-5425)    I acknowledge receipt and understanding of these Home Care instructions.

## 2023-08-18 NOTE — OP REPORT
NAME:  Sierra Burroughs  MRN:  [id number]  :  1990      DATE OF OPERATION: 2023    PREOPERATIVE DIAGNOSIS: Incisional Hernia    POSTOPERATIVE DIAGNOSIS: Incisional  Hernia with Adhesions with incarceration    OPERATION PERFORMED:  Laparoscopic Incisional  Hernia Repair with Mesh with Adhesiolysis    SURGEON: Nando Barnes MD    ASSISTANT: Florentino Kate PA-C, PA-C    ANESTHESIOLOGIST:  Steve López MD    ANESTHESIA: General endotracheal anesthesia.      SPECIMEN: None    ESTIMATED BLOOD LOSS: <10 cc.     INDICATIONS: The patient is a 33 y.o. female with a history of symptomatic bulging mass. She is taken to the operating room today for laparoscopic hernia repair with mesh.     PROCEDURE: Following informed consent, the patient was properly identified, taken to the operating room, and placed in the supine position where general endotracheal anesthesia was administered. Intravenous antibiotics were administered by the anesthesiologist in the correct time interval. Sequential compression devices were employed. The abdomen was prepped and draped into a sterile field.     A bladeless optical entry trocar was carefully inserted into the abdomen and pneumoperitoneum was established in the usual fashion. A 5 mm lens/camera was passed into the peritoneal cavity.  Two additional separator trocars were placed under direct vision.     The abdominal wall was examined and demonstrated an incisional hernia with a 3-4 cm defect with adhesions, and with incarcerated fatty tissue. Adhesiolysis was performed and the chronically incarcerated tissue was excised and passed off the field for pathology.  The omentum around the defect was reduced and cautery used to separate the omentum from the hernia sac and fascial edges.  Once the abdominal wall was completely exposed, the fascial defect was closed using figure-of-eight #1 Vicryl sutures via the endoclose with wide fascial bites. A  piece of 4in X 6in vetralight  mesh was chosen and soaked in antibiotic solution.  Four O-Vicryl sutures were placed at the north, south, east and west margins of the mesh, and corresponding incision made in the abdominal wall at the superior margin of the intact fascia, for transfascial anchoring sutures.  The mesh was then rolled and introduced into the abdomen, then unfurled and oriented so as to place the woven mesh toward the hernia defect and the ends of the Vicryl were retrieved with the Endoclose device, and the mesh then further secured to the abdominal wall with the absorbable tacker device.  This resulted in a nice obliteration of the hernia defect with good orientation and placement.      Final inspections revealed a wide overlap and coverage of the fascial defect and a sturdy, effective repair.      The ports were removed and the abdomen desufflated. The trocars were removed under direct vision.  The port site skin incisions were closed with interrupted 4-0 Vicryl subcuticular sutures.  Steri-Strips and Benzoin were applied beneath sterile Band-Aids.     The patient tolerated the procedure well and there were no apparent complications. All sponge, needle, and instrument counts were correct on 2 separate occasions.  She was awakened, extubated, and transferred to the recovery room in satisfactory condition.       ____________________________________   Nando Barnes MD  DD: 8/18/2023  2:21 PM    CC:  [facility];

## 2023-08-18 NOTE — ANESTHESIA PREPROCEDURE EVALUATION
Case: [id number] Date/Time: 23 0845    Procedure: LAPAROSCOPIC INCISIONAL HERNIA REPAIR WITH MESH    Pre-op diagnosis: INCISIONAL HERNIA    Location: [facility] OR 12 / SURGERY [facility]    Surgeons: Nando Barnes M.D.        Anes H&P:  PAST MEDICAL HISTORY:   33 y.o. female who presents for Procedure(s):  LAPAROSCOPIC INCISIONAL HERNIA REPAIR WITH MESH.  She has current and past medical problems significant for:    No past medical history on file.    SMOKING/ALCOHOL/RECREATIONAL DRUG USE:  Social History     Tobacco Use   • Smoking status: Never   • Smokeless tobacco: Never   Vaping Use   • Vaping Use: Never used   Substance Use Topics   • Alcohol use: No   • Drug use: Not Currently     Types: Marijuana     Social History     Substance and Sexual Activity   Drug Use Not Currently   • Types: Marijuana       PAST SURGICAL HISTORY:  Past Surgical History:   Procedure Laterality Date   • REPEAT C SECTOIN WITH SALPINGECTOMY Bilateral 2021    Procedure:  SECTION, REPEAT, WITH SALPINGECTOMY;  Surgeon: Jaden Tucker M.D.;  Location: SURGERY LABOR AND DELIVERY;  Service: Gynecology   • PRIMARY C SECTION N/A 2017    Procedure: PRIMARY C SECTION;  Surgeon: Vandana Cueto M.D.;  Location: LABOR AND DELIVERY;  Service: Labor and Delivery   • TUBAL COAGULATION LAPAROSCOPIC BILATERAL         ALLERGIES:   No Known Allergies    MEDICATIONS:  No current facility-administered medications on file prior to encounter.     Current Outpatient Medications on File Prior to Encounter   Medication Sig Dispense Refill   • montelukast (SINGULAIR) 10 MG Tab Take 1 Tablet by mouth every day. 90 Tablet 1   • cetirizine (ZYRTEC) 10 MG Tab Take 1 Tablet by mouth every day. (Patient not taking: Reported on 8/15/2023) 90 Tablet 3   • fluticasone (FLONASE) 50 MCG/ACT nasal spray Administer 1 Spray into affected nostril(S) 2 times a day. (Patient not taking: Reported on 8/15/2023) 18.2 g 11       LABS:  Lab Results    Component Value Date/Time    HEMOGLOBIN 14.1 12/02/2021 0957    HEMATOCRIT 44.2 12/02/2021 0957    WBC 7.6 12/02/2021 0957     Lab Results   Component Value Date/Time    SODIUM 138 05/26/2023 0948    POTASSIUM 4.2 05/26/2023 0948    CHLORIDE 102 05/26/2023 0948    CO2 24 05/26/2023 0948    GLUCOSE 89 05/26/2023 0948    BUN 14 05/26/2023 0948    CALCIUM 8.8 05/26/2023 0948         PREVIOUS ANESTHETICS: See EMR  __________________________________________    Relevant Problems   No relevant active problems       Physical Exam    Airway   Mallampati: II  TM distance: >3 FB  Neck ROM: full       Cardiovascular - normal exam  Rhythm: regular  Rate: normal  (-) murmur     Dental - normal exam           Pulmonary - normal exam  Breath sounds clear to auscultation     Abdominal    Neurological - normal exam                 Anesthesia Plan    ASA 1       Plan - general       Airway plan will be ETT          Induction: intravenous    Postoperative Plan: Postoperative administration of opioids is intended.    Pertinent diagnostic labs and testing reviewed    Informed Consent:    Anesthetic plan and risks discussed with patient.    Use of blood products discussed with: patient whom consented to blood products.

## 2023-08-18 NOTE — ANESTHESIA PROCEDURE NOTES
Airway    Date/Time: 8/18/2023 9:04 AM    Performed by: Steve López D.O.  Authorized by: Steve López D.O.    Location:  OR  Urgency:  Elective  Difficult Airway: No    Indications for Airway Management:  Anesthesia      Spontaneous Ventilation: absent    Sedation Level:  Deep  Preoxygenated: Yes    Patient Position:  Sniffing  MILS Maintained Throughout: No    Mask Difficulty Assessment:  1 - vent by mask  Final Airway Type:  Endotracheal airway  Final Endotracheal Airway:  ETT  Cuffed: Yes    Technique Used for Successful ETT Placement:  Direct laryngoscopy    Insertion Site:  Oral  Blade Type:  Hamzah  Laryngoscope Blade/Videolaryngoscope Blade Size:  3  ETT Size (mm):  7.0  Measured from:  Lips  ETT to Lips (cm):  24  Placement Verified by: auscultation and capnometry    Cormack-Lehane Classification:  Grade IIa - partial view of glottis  Number of Attempts at Approach:  1  Ventilation Between Attempts:  None  Number of Other Approaches Attempted:  0

## 2023-08-18 NOTE — PROGRESS NOTES
1135 pt in phase 2, vss, pt reports nausea, medicated see mar. Pt has abdominal binder in place. CDI, ice pack in place    1215 dc instructions reviewed, pt reports pain tolerable, nausea better, verified with Florentino Kate pain meds sent to pharmacy from the office    1230pt states ready to dc home, educated on using pillow for comfort to splint abdomen. Ice packs given, pt has all belongings, all needs met, safe to dc home

## 2023-08-18 NOTE — ANESTHESIA POSTPROCEDURE EVALUATION
Patient: Sierra Burroughs    Procedure Summary     Date: 08/18/23 Room / Location: [facility] OR 09 / SURGERY [facility]    Anesthesia Start: 0857 Anesthesia Stop: 0953    Procedure: LAPAROSCOPIC INCISIONAL HERNIA REPAIR WITH MESH (Abdomen) Diagnosis: (INCISIONAL HERNIA)    Surgeons: Nando Barnes M.D. Responsible Provider: Steve López D.O.    Anesthesia Type: general ASA Status: 1          Final Anesthesia Type: general  Last vitals  BP   Blood Pressure: 122/76    Temp   35.8 °C (96.5 °F)    Pulse   (!) 55   Resp   12    SpO2   93 %      Anesthesia Post Evaluation    Patient location during evaluation: PACU  Patient participation: complete - patient participated  Level of consciousness: awake and alert    Airway patency: patent  Anesthetic complications: no  Cardiovascular status: hemodynamically stable  Respiratory status: acceptable  Hydration status: euvolemic    PONV: none          No notable events documented.     Nurse Pain Score: 3 (NPRS)

## 2023-12-30 ENCOUNTER — OFFICE VISIT (OUTPATIENT)
Dept: URGENT CARE | Facility: PHYSICIAN GROUP | Age: 33
End: 2023-12-30
Payer: MEDICAID

## 2023-12-30 VITALS
TEMPERATURE: 97 F | DIASTOLIC BLOOD PRESSURE: 98 MMHG | HEART RATE: 76 BPM | OXYGEN SATURATION: 99 % | RESPIRATION RATE: 16 BRPM | BODY MASS INDEX: 29.82 KG/M2 | HEIGHT: 67 IN | WEIGHT: 190 LBS | SYSTOLIC BLOOD PRESSURE: 136 MMHG

## 2023-12-30 DIAGNOSIS — R03.0 ELEVATED BP WITHOUT DIAGNOSIS OF HYPERTENSION: ICD-10-CM

## 2023-12-30 DIAGNOSIS — J06.9 VIRAL UPPER RESPIRATORY INFECTION: ICD-10-CM

## 2023-12-30 PROCEDURE — 3075F SYST BP GE 130 - 139MM HG: CPT

## 2023-12-30 PROCEDURE — 99213 OFFICE O/P EST LOW 20 MIN: CPT

## 2023-12-30 PROCEDURE — 3080F DIAST BP >= 90 MM HG: CPT

## 2023-12-30 ASSESSMENT — ENCOUNTER SYMPTOMS
SORE THROAT: 1
SHORTNESS OF BREATH: 0
VOMITING: 0
COUGH: 1
MYALGIAS: 0
FEVER: 0
CHILLS: 0
DIARRHEA: 0

## 2023-12-30 NOTE — PROGRESS NOTES
Subjective     Sierra Burroughs is a 33 y.o. female who presents with Pharyngitis, Otalgia, and Nasal Congestion            Pharyngitis   This is a new problem. The current episode started in the past 7 days. There has been no fever. Associated symptoms include congestion, coughing, ear pain and a plugged ear sensation. Pertinent negatives include no diarrhea, shortness of breath or vomiting. Associated symptoms comments: Rhinorrhea . Treatments tried: cough drops.   Otalgia   Associated symptoms include coughing and a sore throat. Pertinent negatives include no diarrhea or vomiting.     Patient presents with symptoms that started 45 days ago.  She endorses ear pain/ear fullness, nasal congestion, sore throat, and cough.  She denies any fever, chills, fatigue, vomiting, or diarrhea.  Her son is sick with similar symptoms and currently being seen in the clinic as well    Patient's current problem list, medications, and past medical/surgical history were reviewed in Epic.    PMH:  has no past medical history of Addisons disease ([facility]), Adrenal disorder ([facility]), Allergy, Anemia, Anxiety, Arrhythmia, Arthritis, ASTHMA, Asthma, Blood transfusion without reported diagnosis, CAD (coronary artery disease), Cancer ([facility]), Cataract, CHF (congestive heart failure) ([facility]), Clotting disorder ([facility]), Congestive heart failure ([facility]), COPD, COPD (chronic obstructive pulmonary disease) ([facility]), Cushings syndrome ([facility]), Depression, Diabetes, Diabetes ([facility]), Diabetic neuropathy ([facility]), GERD (gastroesophageal reflux disease), Glaucoma, Goiter, Head ache, Heart attack ([facility]), Heart murmur, HIV (human immunodeficiency virus infection) ([facility]), Hyperlipidemia, Hypertension, IBD (inflammatory bowel disease), Infectious disease, Kidney disease, Liver disease, Meningitis, Migraine, Muscle disorder, Osteoporosis, Parathyroid disorder ([facility]), Pituitary disease ([facility]), Psychiatric disorder, Pulmonary emphysema ([facility]), Renal disorder, Seizure ([facility]),  "Seizure disorder (HCC), Sickle cell disease (HCC), Stroke (HCC), Substance abuse (HCC), Thyroid disease, Tuberculosis, Ulcer, or Urinary tract infection.  MEDS: No current outpatient medications on file.  ALLERGIES: No Known Allergies  SURGHX:   Past Surgical History:   Procedure Laterality Date    VENTRAL HERNIA REPAIR LAPAROSCOPIC  2023    Procedure: LAPAROSCOPIC INCISIONAL HERNIA REPAIR WITH MESH;  Surgeon: Nando Barnes M.D.;  Location: SURGERY [facility];  Service: General    REPEAT C SECTOIN WITH SALPINGECTOMY Bilateral 2021    Procedure:  SECTION, REPEAT, WITH SALPINGECTOMY;  Surgeon: Jaden Tucker M.D.;  Location: SURGERY LABOR AND DELIVERY;  Service: Gynecology    PRIMARY C SECTION N/A 2017    Procedure: PRIMARY C SECTION;  Surgeon: Vandana Cueto M.D.;  Location: LABOR AND DELIVERY;  Service: Labor and Delivery    TUBAL COAGULATION LAPAROSCOPIC BILATERAL       SOCHX:  reports that she has never smoked. She has never used smokeless tobacco. She reports that she does not currently use drugs after having used the following drugs: Marijuana. She reports that she does not drink alcohol.  FH: Reviewed with patient, not pertinent to this visit.       Review of Systems   Constitutional:  Negative for chills, fever and malaise/fatigue.   HENT:  Positive for congestion, ear pain and sore throat.    Respiratory:  Positive for cough. Negative for shortness of breath.    Gastrointestinal:  Negative for diarrhea and vomiting.   Musculoskeletal:  Negative for myalgias.              Objective     BP (!) 136/98 (BP Location: Right arm, Patient Position: Sitting, BP Cuff Size: Adult)   Pulse 76   Temp 36.1 °C (97 °F)   Resp 16   Ht 1.702 m (5' 7\")   Wt 86.2 kg (190 lb)   SpO2 99%   BMI 29.76 kg/m²      Physical Exam  Constitutional:       Appearance: Normal appearance.   HENT:      Head: Normocephalic.      Nose: Congestion present.      Mouth/Throat:      Pharynx: No " posterior oropharyngeal erythema.   Eyes:      Extraocular Movements: Extraocular movements intact.   Cardiovascular:      Rate and Rhythm: Normal rate and regular rhythm.      Pulses: Normal pulses.      Heart sounds: Normal heart sounds.   Pulmonary:      Effort: Pulmonary effort is normal.      Breath sounds: Normal breath sounds.   Musculoskeletal:         General: Normal range of motion.      Cervical back: Normal range of motion.   Skin:     General: Skin is warm and dry.   Neurological:      General: No focal deficit present.      Mental Status: She is alert.   Psychiatric:         Mood and Affect: Mood normal.         Behavior: Behavior normal.         Judgment: Judgment normal.                Assessment & Plan          1. Viral upper respiratory infection      2. Elevated BP without diagnosis of hypertension            Patient's physical examination is unremarkable except for nasal congestion.  Her ears did not show any signs of infection.  Her presentation is consistent with a viral upper respiratory tract infection.  Advised on symptomatic treatment at home.  Recommended OTC antihistamine with decongestant and Flonase nasal spray for relief of nasal congestion and ear fullness.  Patient's blood pressure in the clinic is a little elevated at 136/98, no diagnosis of hypertension.  She denies any symptoms related to this.  Advised to monitor blood pressure at home and record and report any BP readings of 140/90 and above to her PCP instructed to return to clinic with worsening or persistent symptoms.  Discussed treatment plan with patient, she is agreeable and verbalized understanding.  Educated patient on signs and symptoms watch out for, when to return to the clinic or go to the ER.    Recommended supportive treatment at home:  OTC Tylenol or Motrin for fever/discomfort.  OTC supportive care for nasal congestion - saline nasal spray/Flonase nasal spray and/or netipot  Humidifier and steam inhalation/warm  showers.  Increase oral fluid intake.      Electronically Signed by HENRY Thorpe

## 2024-02-12 ENCOUNTER — APPOINTMENT (OUTPATIENT)
Dept: RADIOLOGY | Facility: IMAGING CENTER | Age: 34
End: 2024-02-12
Attending: PHYSICIAN ASSISTANT
Payer: MEDICAID

## 2024-02-12 ENCOUNTER — NON-PROVIDER VISIT (OUTPATIENT)
Dept: URGENT CARE | Facility: PHYSICIAN GROUP | Age: 34
End: 2024-02-12
Payer: MEDICAID

## 2024-02-12 ENCOUNTER — OFFICE VISIT (OUTPATIENT)
Dept: MEDICAL GROUP | Facility: CLINIC | Age: 34
End: 2024-02-12
Payer: MEDICAID

## 2024-02-12 VITALS
OXYGEN SATURATION: 100 % | SYSTOLIC BLOOD PRESSURE: 116 MMHG | TEMPERATURE: 98.5 F | HEART RATE: 66 BPM | HEIGHT: 67 IN | RESPIRATION RATE: 20 BRPM | DIASTOLIC BLOOD PRESSURE: 62 MMHG | WEIGHT: 194.22 LBS | BODY MASS INDEX: 30.48 KG/M2

## 2024-02-12 DIAGNOSIS — D36.10 NEUROMA: ICD-10-CM

## 2024-02-12 DIAGNOSIS — Z13.220 LIPID SCREENING: ICD-10-CM

## 2024-02-12 DIAGNOSIS — Z23 NEED FOR VACCINATION: ICD-10-CM

## 2024-02-12 PROCEDURE — 3078F DIAST BP <80 MM HG: CPT | Performed by: PHYSICIAN ASSISTANT

## 2024-02-12 PROCEDURE — 3074F SYST BP LT 130 MM HG: CPT | Performed by: PHYSICIAN ASSISTANT

## 2024-02-12 PROCEDURE — 99214 OFFICE O/P EST MOD 30 MIN: CPT | Mod: 25 | Performed by: PHYSICIAN ASSISTANT

## 2024-02-12 PROCEDURE — 90746 HEPB VACCINE 3 DOSE ADULT IM: CPT | Performed by: PHYSICIAN ASSISTANT

## 2024-02-12 PROCEDURE — 90471 IMMUNIZATION ADMIN: CPT | Performed by: PHYSICIAN ASSISTANT

## 2024-02-12 PROCEDURE — 90686 IIV4 VACC NO PRSV 0.5 ML IM: CPT | Performed by: PHYSICIAN ASSISTANT

## 2024-02-12 PROCEDURE — 73630 X-RAY EXAM OF FOOT: CPT | Mod: TC,FY,RT | Performed by: FAMILY MEDICINE

## 2024-02-12 PROCEDURE — 90472 IMMUNIZATION ADMIN EACH ADD: CPT | Performed by: PHYSICIAN ASSISTANT

## 2024-02-12 ASSESSMENT — PATIENT HEALTH QUESTIONNAIRE - PHQ9: CLINICAL INTERPRETATION OF PHQ2 SCORE: 0

## 2024-02-12 NOTE — PROGRESS NOTES
"cc: Foot pain    Subjective:     Sierra Burroughs is a 33 y.o. female presenting for foot pain    Patient presents to the office for foot pain.   Patient has been having right foot pain and reports a previous injury.  She was wrestling with her  and landed on her right foot.  She staets that when she steps on her right foot she will have a shooting pain.  She admits to bunions.  She denies having a sensation of walking on a rock or marble, but maybe.  She states that it has been worse in the last 3 months.  She states that the pain comes and goes and will happen when she steps down.  It goes as fast as it comes.     Review of systems:  See above.   Denies any symptoms unless previously indicated.      No current outpatient medications on file.    Allergies, past medical history, past surgical history, family history, social history reviewed and updated    Objective:     Vitals: /62 (BP Location: Left arm, Patient Position: Sitting, BP Cuff Size: Adult)   Pulse 66   Temp 36.9 °C (98.5 °F) (Temporal)   Resp 20   Ht 1.702 m (5' 7\")   Wt 88.1 kg (194 lb 3.6 oz)   LMP  (LMP Unknown)   SpO2 100%   BMI 30.42 kg/m²   General: Alert, pleasant, NAD  EYES:   PERRL, EOMI, no icterus or pallor.  Conjunctivae and lids normal.   HENT:  Normocephalic.  External ears normal.  Neck supple.     Respiratory: Normal respiratory effort.   Abdomen: obese  Skin: Warm, dry, no rashes.  Musculoskeletal: Gait is normal.  Moves all extremities well.    Extremities: neuroma base of 3rd toe right foot.  Pain with pressure   Neurological: No tremors, sensation grossly intact, gait is normal, CN2-12 intact.  Psych:  Affect/mood is normal, judgement is good, memory is intact, grooming is appropriate.    Assessment/Plan:     Sierra was seen today for foot pain.    Diagnoses and all orders for this visit:    Neuroma  -     DX-FOOT-COMPLETE 3+ RIGHT; Future  -     Referral to Podiatry    Patient does have a palpable " lump in the base of the third toe.  Believe this to be a neuroma.  Will obtain x-rays and refer patient to podiatry.    Lipid screening  -     Lipid Profile; Future  -     Comp Metabolic Panel; Future    Labs to be drawn no sooner than May.    Need for vaccination  -     Hepatitis B Vaccine Adult 20+  -     INFLUENZA VACCINE QUAD INJ (PF)    Vaccines to be given today.        No follow-ups on file.    Please note that this dictation was created using voice recognition software. I have made every reasonable attempt to correct obvious errors, but expect that there are errors of grammar and possible content that I did not discover before finalizing note.

## 2024-04-30 ENCOUNTER — APPOINTMENT (OUTPATIENT)
Dept: MEDICAL GROUP | Facility: CLINIC | Age: 34
End: 2024-04-30
Payer: MEDICAID

## 2024-04-30 VITALS
RESPIRATION RATE: 19 BRPM | TEMPERATURE: 97.4 F | OXYGEN SATURATION: 100 % | DIASTOLIC BLOOD PRESSURE: 70 MMHG | HEART RATE: 58 BPM | HEIGHT: 67 IN | BODY MASS INDEX: 31.07 KG/M2 | SYSTOLIC BLOOD PRESSURE: 122 MMHG | WEIGHT: 197.97 LBS

## 2024-04-30 DIAGNOSIS — Z23 NEED FOR VACCINATION: ICD-10-CM

## 2024-04-30 DIAGNOSIS — F90.9 ATTENTION DEFICIT HYPERACTIVITY DISORDER (ADHD), UNSPECIFIED ADHD TYPE: ICD-10-CM

## 2024-04-30 DIAGNOSIS — Z11.1 SCREENING-PULMONARY TB: ICD-10-CM

## 2024-04-30 PROCEDURE — 3078F DIAST BP <80 MM HG: CPT | Performed by: PHYSICIAN ASSISTANT

## 2024-04-30 PROCEDURE — 3074F SYST BP LT 130 MM HG: CPT | Performed by: PHYSICIAN ASSISTANT

## 2024-04-30 PROCEDURE — 86580 TB INTRADERMAL TEST: CPT | Performed by: PHYSICIAN ASSISTANT

## 2024-04-30 PROCEDURE — 99213 OFFICE O/P EST LOW 20 MIN: CPT | Mod: 25 | Performed by: PHYSICIAN ASSISTANT

## 2024-04-30 PROCEDURE — 90710 MMRV VACCINE SC: CPT | Performed by: PHYSICIAN ASSISTANT

## 2024-04-30 PROCEDURE — 90472 IMMUNIZATION ADMIN EACH ADD: CPT | Performed by: PHYSICIAN ASSISTANT

## 2024-04-30 PROCEDURE — 90746 HEPB VACCINE 3 DOSE ADULT IM: CPT | Performed by: PHYSICIAN ASSISTANT

## 2024-04-30 PROCEDURE — 90471 IMMUNIZATION ADMIN: CPT | Performed by: PHYSICIAN ASSISTANT

## 2024-04-30 NOTE — PROGRESS NOTES
"cc:  vaccination    Subjective:     Sierra Burroughs is a 33 y.o. female presenting for vaccination      Patient presents to the office for vaccination.  Patient is needing vaccines for school.  She needs hepatitis B and this will finish the series for her.  She is also needing vaccines for varicella and MMR.    Patient states that she was diagnosed with adhd as a child.  She states that she was placed on ritalin at one point.  She states that it is hard for her to focus which is difficult as she is going to school.  She would like to be evaluated. 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     Review of systems:  See above.   Denies any symptoms unless previously indicated.      No current outpatient medications on file.    Allergies, past medical history, past surgical history, family history, social history reviewed and updated    Objective:     Vitals: /70 (BP Location: Left arm, Patient Position: Sitting, BP Cuff Size: Adult)   Pulse (!) 58   Temp 36.3 °C (97.4 °F) (Temporal)   Resp 19   Ht 1.702 m (5' 7\")   Wt 89.8 kg (197 lb 15.6 oz)   SpO2 100%   BMI 31.01 kg/m²   General: Alert, pleasant, NAD  EYES:   PERRL, EOMI, no icterus or pallor.  Conjunctivae and lids normal.   HENT:  Normocephalic.  External ears normal.   Neck supple.     Respiratory: Normal respiratory effort.    Abdomen: Not obese  Skin: Warm, dry, no rashes.  Musculoskeletal: Gait is normal.  Moves all extremities well.    Extremities: Normal range of motion all extremities.   Neurological: No tremors, sensation grossly intact, patellar reflexes 2+ symmetric, tone/strength normal, gait is normal, CN2-12 intact.  Psych:  Affect/mood is normal, judgement is good, memory is intact, grooming is appropriate.    Assessment/Plan:     Sierra was seen today for immunizations and referral needed.    Diagnoses and all orders for this visit:    Attention deficit hyperactivity disorder (ADHD), unspecified ADHD type  -  "    Referral to Psychiatry    Will submit a referral to psychiatry for further evaluation.    Screening-pulmonary TB  -     PPD SKIN TEST    PPD testing ordered at this time.  Patient understands that she will need to follow-up in 48 hours for reading of test.    Need for vaccination  -     MMR and Varicella Combined Vaccine SQ  -     Hepatitis B Vaccine Adult 20+    Vaccines to be given today.        No follow-ups on file.    Please note that this dictation was created using voice recognition software. I have made every reasonable attempt to correct obvious errors, but expect that there are errors of grammar and possible content that I did not discover before finalizing note.

## 2024-05-02 ENCOUNTER — NON-PROVIDER VISIT (OUTPATIENT)
Dept: MEDICAL GROUP | Facility: CLINIC | Age: 34
End: 2024-05-02
Payer: MEDICAID

## 2024-05-02 LAB — TB WHEAL 3D P 5 TU DIAM: NORMAL MM

## 2024-05-02 PROCEDURE — 99999 PR NO CHARGE: CPT | Performed by: PHYSICIAN ASSISTANT

## 2024-05-02 NOTE — PROGRESS NOTES
Sierra Burroughs is a 33 y.o. female here for a non-provider visit for PPD reading -- Step 1 of 2.      1.  Resulted in Epic under enter/edit results? Yes   2.  TB evaluation questionnaire scanned into chart and original given to patient?Yes      3. Was induration greater than 0 mm? No.    If Step 1 of 2, when is patient returning for second step (delete if N/A): Patient may go to urgent care for second step also asked if patient would want to schedule Monday but patient declined at this time    Routed to PCP? No

## 2024-05-14 ENCOUNTER — NON-PROVIDER VISIT (OUTPATIENT)
Dept: URGENT CARE | Facility: PHYSICIAN GROUP | Age: 34
End: 2024-05-14

## 2024-05-14 DIAGNOSIS — Z11.1 ENCOUNTER FOR PPD TEST: ICD-10-CM

## 2024-05-14 PROCEDURE — 86580 TB INTRADERMAL TEST: CPT | Performed by: FAMILY MEDICINE

## 2024-05-14 NOTE — PROGRESS NOTES
Sierra Burroughs is a 34 y.o. female here for a non-provider visit for PPD placement -- Step 2 of 2    Reason for PPD:  school requirement    1. TB evaluation questionnaire completed by patient? Yes      -  If any answers marked yes did you contact a provider prior to placing? Not Indicated  2.  Patient notified to return to clinic for reading on: 5/16-17  3.  PPD Placement documentation completed on TB evaluation questionnaire? Yes  4.  Location of TB evaluation questionnaire filed: folder

## 2024-05-16 ENCOUNTER — NON-PROVIDER VISIT (OUTPATIENT)
Dept: URGENT CARE | Facility: PHYSICIAN GROUP | Age: 34
End: 2024-05-16

## 2024-05-16 ENCOUNTER — HOSPITAL ENCOUNTER (OUTPATIENT)
Dept: LAB | Facility: MEDICAL CENTER | Age: 34
End: 2024-05-16
Attending: PHYSICIAN ASSISTANT
Payer: MEDICAID

## 2024-05-16 DIAGNOSIS — Z13.220 LIPID SCREENING: ICD-10-CM

## 2024-05-16 LAB
ALBUMIN SERPL BCP-MCNC: 4.3 G/DL (ref 3.2–4.9)
ALBUMIN/GLOB SERPL: 1.4 G/DL
ALP SERPL-CCNC: 49 U/L (ref 30–99)
ALT SERPL-CCNC: 8 U/L (ref 2–50)
ANION GAP SERPL CALC-SCNC: 9 MMOL/L (ref 7–16)
AST SERPL-CCNC: 15 U/L (ref 12–45)
BILIRUB SERPL-MCNC: 0.6 MG/DL (ref 0.1–1.5)
BUN SERPL-MCNC: 12 MG/DL (ref 8–22)
CALCIUM ALBUM COR SERPL-MCNC: 9.1 MG/DL (ref 8.5–10.5)
CALCIUM SERPL-MCNC: 9.3 MG/DL (ref 8.5–10.5)
CHLORIDE SERPL-SCNC: 104 MMOL/L (ref 96–112)
CHOLEST SERPL-MCNC: 150 MG/DL (ref 100–199)
CO2 SERPL-SCNC: 27 MMOL/L (ref 20–33)
CREAT SERPL-MCNC: 0.74 MG/DL (ref 0.5–1.4)
FASTING STATUS PATIENT QL REPORTED: NORMAL
GFR SERPLBLD CREATININE-BSD FMLA CKD-EPI: 108 ML/MIN/1.73 M 2
GLOBULIN SER CALC-MCNC: 3 G/DL (ref 1.9–3.5)
GLUCOSE SERPL-MCNC: 96 MG/DL (ref 65–99)
HDLC SERPL-MCNC: 45 MG/DL
LDLC SERPL CALC-MCNC: 83 MG/DL
POTASSIUM SERPL-SCNC: 4.4 MMOL/L (ref 3.6–5.5)
PROT SERPL-MCNC: 7.3 G/DL (ref 6–8.2)
SODIUM SERPL-SCNC: 140 MMOL/L (ref 135–145)
TB WHEAL 3D P 5 TU DIAM: NORMAL MM
TRIGL SERPL-MCNC: 111 MG/DL (ref 0–149)

## 2024-05-16 NOTE — PROGRESS NOTES
Sierra Burroughs is a 34 y.o. female here for a non-provider visit for PPD reading -- Step 2 of 2.      1.  Resulted in Epic under enter/edit results? Yes   2.  TB evaluation questionnaire scanned into chart and original given to patient?Yes      3. Was induration greater than 0 mm? No.    If Step 1 of 2, when is patient returning for second step (delete if N/A): N/A     Routed to PCP? Yes

## 2024-05-31 ENCOUNTER — PHARMACY VISIT (OUTPATIENT)
Dept: PHARMACY | Facility: MEDICAL CENTER | Age: 34
End: 2024-05-31
Payer: COMMERCIAL

## 2024-05-31 PROCEDURE — RXMED WILLOW AMBULATORY MEDICATION CHARGE: Performed by: INTERNAL MEDICINE

## 2024-05-31 RX ORDER — VARICELLA VIRUS VACCINE LIVE 1350 [PFU]/.5ML
INJECTION, POWDER, LYOPHILIZED, FOR SUSPENSION SUBCUTANEOUS
Qty: 0.5 ML | Refills: 0 | OUTPATIENT
Start: 2024-05-31

## 2024-05-31 RX ORDER — TEMAZEPAM 15 MG/1
CAPSULE ORAL
Qty: 0.5 ML | Refills: 0 | OUTPATIENT
Start: 2024-05-31

## 2024-10-06 ENCOUNTER — APPOINTMENT (OUTPATIENT)
Dept: URGENT CARE | Facility: PHYSICIAN GROUP | Age: 34
End: 2024-10-06
Payer: MEDICAID

## 2024-10-06 ENCOUNTER — OFFICE VISIT (OUTPATIENT)
Dept: URGENT CARE | Facility: PHYSICIAN GROUP | Age: 34
End: 2024-10-06
Payer: MEDICAID

## 2024-10-06 VITALS
HEIGHT: 67 IN | RESPIRATION RATE: 16 BRPM | OXYGEN SATURATION: 97 % | TEMPERATURE: 97.7 F | DIASTOLIC BLOOD PRESSURE: 64 MMHG | WEIGHT: 187.39 LBS | HEART RATE: 60 BPM | SYSTOLIC BLOOD PRESSURE: 110 MMHG | BODY MASS INDEX: 29.41 KG/M2

## 2024-10-06 DIAGNOSIS — J32.9 BACTERIAL SINUSITIS: ICD-10-CM

## 2024-10-06 DIAGNOSIS — B96.89 BACTERIAL SINUSITIS: ICD-10-CM

## 2024-10-06 PROBLEM — Z76.89 ENCOUNTER TO ESTABLISH CARE: Status: RESOLVED | Noted: 2022-01-03 | Resolved: 2024-10-06

## 2024-10-06 PROCEDURE — 3078F DIAST BP <80 MM HG: CPT | Performed by: FAMILY MEDICINE

## 2024-10-06 PROCEDURE — 99213 OFFICE O/P EST LOW 20 MIN: CPT | Performed by: FAMILY MEDICINE

## 2024-10-06 PROCEDURE — 3074F SYST BP LT 130 MM HG: CPT | Performed by: FAMILY MEDICINE

## 2024-11-18 ENCOUNTER — OFFICE VISIT (OUTPATIENT)
Dept: URGENT CARE | Facility: PHYSICIAN GROUP | Age: 34
End: 2024-11-18
Payer: MEDICAID

## 2024-11-18 VITALS
SYSTOLIC BLOOD PRESSURE: 120 MMHG | RESPIRATION RATE: 18 BRPM | OXYGEN SATURATION: 96 % | WEIGHT: 195 LBS | HEIGHT: 67 IN | TEMPERATURE: 97 F | BODY MASS INDEX: 30.61 KG/M2 | DIASTOLIC BLOOD PRESSURE: 80 MMHG | HEART RATE: 78 BPM

## 2024-11-18 DIAGNOSIS — H66.002 ACUTE SUPPURATIVE OTITIS MEDIA OF LEFT EAR WITHOUT SPONTANEOUS RUPTURE OF TYMPANIC MEMBRANE, RECURRENCE NOT SPECIFIED: ICD-10-CM

## 2024-11-18 PROCEDURE — 3079F DIAST BP 80-89 MM HG: CPT | Performed by: FAMILY MEDICINE

## 2024-11-18 PROCEDURE — 99214 OFFICE O/P EST MOD 30 MIN: CPT | Performed by: FAMILY MEDICINE

## 2024-11-18 PROCEDURE — 3074F SYST BP LT 130 MM HG: CPT | Performed by: FAMILY MEDICINE

## 2024-11-19 NOTE — PROGRESS NOTES
Subjective:      Chief Complaint   Patient presents with    Congestion     Chest congestion and sinus issues, green and brown mucus production sx 2 wks.  Left ear pain sx 2 days                Otalgia - left  This is a new problem. The current episode started in the past 7 days. The problem occurs constantly. The problem has been unchanged. Associated symptoms include fever, congestion and coughing. Pertinent negatives include no abdominal pain, chest pain, chills,  , headaches, joint swelling, myalgias, nausea, neck pain, rash or visual change. Nothing aggravates the symptoms. She has tried nothing for the symptoms.     Social History     Tobacco Use    Smoking status: Never    Smokeless tobacco: Never   Vaping Use    Vaping status: Never Used   Substance Use Topics    Alcohol use: No    Drug use: Not Currently     Types: Marijuana         Current Outpatient Medications on File Prior to Visit   Medication Sig Dispense Refill    measles, mumps and rubella vaccine (M-M-R II) Recon Soln Inject  under the skin. (Patient not taking: Reported on 11/18/2024) 0.5 mL 0    varicella virus vaccine live, PF, (VARIVAX) 1350 PFU/0.5ML injection Inject  under the skin. (Patient not taking: Reported on 11/18/2024) 0.5 mL 0     No current facility-administered medications on file prior to visit.         No past medical history on file.      Family History   Problem Relation Age of Onset    Diabetes Mother     Hypertension Mother     Diabetes Father     Hypertension Father     No Known Problems Sister     Diabetes Paternal Grandfather     Hypertension Paternal Grandfather           Review of Systems   Constitutional: +fever  HENT: Positive for congestion and ear pain. Negative for hearing loss and tinnitus.    Respiratory:   Negative for hemoptysis, shortness of breath and wheezing.    Cardiovascular: Negative for chest pain, palpitations and leg swelling.   Gastrointestinal: Negative for nausea and abdominal pain.  "  Musculoskeletal: Negative for myalgias, joint swelling and neck pain.   Skin: Negative for rash.   Neurological: Negative for headaches.   All other systems reviewed and are negative.         Objective:     /80   Pulse 78   Temp 36.1 °C (97 °F) (Temporal)   Resp 18   Ht 1.702 m (5' 7\")   Wt 88.5 kg (195 lb)   SpO2 96%     Physical Exam   Constitutional: Vital signs are normal.  No distress.   HENT:   Head: There is normal jaw occlusion.   Right Ear: External ear normal. Tympanic membrane is normal. No middle ear effusion.   Left Ear: External ear normal. Tympanic membrane is abnormal - erythematous and bulging. A middle ear effusion is present.   Nose: Rhinorrhea and congestion present. No nasal discharge.   Mouth/Throat: Mucous membranes are moist. No oral lesions. Pharynx erythema present. No oropharyngeal exudate, pharynx swelling or pharynx petechiae. Tonsils are 0 on the right. Tonsils are 0 on the left. No tonsillar exudate.   Eyes: Conjunctivae and EOM are normal. Pupils are equal, round, and reactive to light. Right eye exhibits no discharge. Left eye exhibits no discharge.   Neck: Normal range of motion. Neck supple. Cervical adenopathy present.   Cardiovascular: Normal rate and regular rhythm.  Pulses are palpable.    No murmur heard.  Pulmonary/Chest: Effort normal and breath sounds normal. There is normal air entry. No respiratory distress. no wheezes, rhonchi,  retraction.   Musculoskeletal:   no edema.   Neurological: A/O x 3.   CN 2-12 intact   Skin: Skin is warm. Capillary refill takes less than 3 seconds. No purpura and no rash noted. Patient is not diaphoretic. No jaundice or pallor.   Nursing note and vitals reviewed.              Assessment/Plan:     1. Acute suppurative otitis media of left ear without spontaneous rupture of tympanic membrane, recurrence not specified     - amoxicillin-clavulanate (AUGMENTIN) 875-125 MG Tab; Take 1 Tablet by mouth 2 times a day for 7 days.  " Dispense: 14 Tablet; Refill: 0      Differential diagnosis, natural history, supportive care, and indications for immediate follow-up discussed. All questions answered. Patient agrees with the plan of care.     Follow-up as needed if symptoms worsen or fail to improve to PCP, Urgent care or Emergency Room.     I have personally reviewed prior external notes and test results pertinent to today's visit.  I have independently reviewed and interpreted all diagnostics ordered during this urgent care acute visit.

## 2024-12-04 ENCOUNTER — HOSPITAL ENCOUNTER (OUTPATIENT)
Facility: MEDICAL CENTER | Age: 34
End: 2024-12-04
Attending: PREVENTIVE MEDICINE
Payer: COMMERCIAL

## 2024-12-04 ENCOUNTER — EMPLOYEE HEALTH (OUTPATIENT)
Dept: OCCUPATIONAL MEDICINE | Facility: CLINIC | Age: 34
End: 2024-12-04

## 2024-12-04 ENCOUNTER — EH NON-PROVIDER (OUTPATIENT)
Dept: OCCUPATIONAL MEDICINE | Facility: CLINIC | Age: 34
End: 2024-12-04

## 2024-12-04 DIAGNOSIS — Z02.89 ENCOUNTER FOR OCCUPATIONAL HEALTH EXAMINATION: ICD-10-CM

## 2024-12-04 DIAGNOSIS — Z02.89 ENCOUNTER FOR OCCUPATIONAL HEALTH EXAMINATION: Primary | ICD-10-CM

## 2024-12-04 LAB
AMP AMPHETAMINE: NORMAL
BAR BARBITURATES: NORMAL
BZO BENZODIAZEPINES: NORMAL
COC COCAINE: NORMAL
INT CON NEG: NORMAL
INT CON POS: NORMAL
MDMA ECSTASY: NORMAL
MET METHAMPHETAMINES: NORMAL
MTD METHADONE: NORMAL
OPI OPIATES: NORMAL
OXY OXYCODONE: NORMAL
PCP PHENCYCLIDINE: NORMAL
POC URINE DRUG SCREEN OCDRS: NORMAL
THC: NORMAL

## 2024-12-04 PROCEDURE — 7101 PR PHYSICAL: Performed by: PREVENTIVE MEDICINE

## 2024-12-04 PROCEDURE — 86480 TB TEST CELL IMMUN MEASURE: CPT | Performed by: PREVENTIVE MEDICINE

## 2024-12-04 PROCEDURE — 80305 DRUG TEST PRSMV DIR OPT OBS: CPT | Performed by: PREVENTIVE MEDICINE

## 2024-12-04 PROCEDURE — 90656 IIV3 VACC NO PRSV 0.5 ML IM: CPT

## 2024-12-06 LAB
GAMMA INTERFERON BACKGROUND BLD IA-ACNC: 0.05 IU/ML
M TB IFN-G BLD-IMP: NEGATIVE
M TB IFN-G CD4+ BCKGRND COR BLD-ACNC: -0.01 IU/ML
MITOGEN IGNF BCKGRD COR BLD-ACNC: 4.57 IU/ML
QFT TB2 - NIL TBQ2: 0 IU/ML

## 2025-02-17 ENCOUNTER — OFFICE VISIT (OUTPATIENT)
Dept: URGENT CARE | Facility: PHYSICIAN GROUP | Age: 35
End: 2025-02-17
Payer: COMMERCIAL

## 2025-02-17 ENCOUNTER — RESULTS FOLLOW-UP (OUTPATIENT)
Dept: URGENT CARE | Facility: PHYSICIAN GROUP | Age: 35
End: 2025-02-17

## 2025-02-17 VITALS
HEART RATE: 65 BPM | SYSTOLIC BLOOD PRESSURE: 116 MMHG | OXYGEN SATURATION: 98 % | HEIGHT: 67 IN | TEMPERATURE: 97.3 F | BODY MASS INDEX: 30.76 KG/M2 | RESPIRATION RATE: 14 BRPM | WEIGHT: 196 LBS | DIASTOLIC BLOOD PRESSURE: 60 MMHG

## 2025-02-17 DIAGNOSIS — J06.9 VIRAL URI WITH COUGH: ICD-10-CM

## 2025-02-17 LAB — S PYO DNA SPEC NAA+PROBE: NOT DETECTED

## 2025-02-17 PROCEDURE — 3074F SYST BP LT 130 MM HG: CPT | Performed by: STUDENT IN AN ORGANIZED HEALTH CARE EDUCATION/TRAINING PROGRAM

## 2025-02-17 PROCEDURE — 87651 STREP A DNA AMP PROBE: CPT | Performed by: STUDENT IN AN ORGANIZED HEALTH CARE EDUCATION/TRAINING PROGRAM

## 2025-02-17 PROCEDURE — 99213 OFFICE O/P EST LOW 20 MIN: CPT | Performed by: STUDENT IN AN ORGANIZED HEALTH CARE EDUCATION/TRAINING PROGRAM

## 2025-02-17 PROCEDURE — 3078F DIAST BP <80 MM HG: CPT | Performed by: STUDENT IN AN ORGANIZED HEALTH CARE EDUCATION/TRAINING PROGRAM

## 2025-02-17 NOTE — PROGRESS NOTES
Subjective:   CHIEF COMPLAINT  Chief Complaint   Patient presents with    Cough     X 5 days Cough, congestion, headache, Lft ear pain       HPI     History of Present Illness  Sierra Burroughs is a 34 y.o. female who presents for evaluation of cough, congestion, left ear pain, headache, and sore throat.    She has been experiencing symptoms for the past 5 days, including a persistent cough and nasal congestion. She reports a runny nose but no fever in recent days. She also reports shortness of breath, which has shown slight improvement today compared to yesterday. She has no history of asthma and has not used any inhalers. She notes a pattern of falling ill, recovering for a week, and then becoming sick again. She has been self-medicating with NyQuil, which has provided minimal relief.    Approximately 2 days ago, she began experiencing pain in her left ear, accompanied by headaches. She reports no ear drainage.    Additionally, she reports a sore throat. She has a history of strep throat, although it has been some time since her last episode. She has no known allergies to penicillin and does not experience yeast infections as a side effect of antibiotics.    ALLERGIES  The patient has no known allergies.    MEDICATIONS  NyQuil        REVIEW OF SYSTEMS  General: no fever or chills  GI: no nausea or vomiting  See HPI for further details.    PAST MEDICAL HISTORY  Patient Active Problem List    Diagnosis Date Noted    ADHD 04/30/2024    Neuroma 02/12/2024    Umbilical hernia 05/25/2023    Healthcare maintenance 05/25/2023    Anosmia 01/03/2022    Seasonal allergies 11/18/2021       SURGICAL HISTORY   has a past surgical history that includes primary c section (N/A, 12/5/2017); repeat c sectoin with salpingectomy (Bilateral, 5/13/2021); tubal coagulation laparoscopic bilateral; and ventral hernia repair laparoscopic (8/18/2023).    ALLERGIES  No Known Allergies    CURRENT MEDICATIONS  Home Medications        "Reviewed by Hernandez Solano Ass't (Medical Assistant) on 02/17/25 at 1135  Med List Status: <None>     Medication Last Dose Status   measles, mumps and rubella vaccine (M-M-R II) Recon Soln Not Taking Active   varicella virus vaccine live, PF, (VARIVAX) 1350 PFU/0.5ML injection Not Taking Active                    SOCIAL HISTORY  Social History     Tobacco Use    Smoking status: Never    Smokeless tobacco: Never   Vaping Use    Vaping status: Never Used   Substance and Sexual Activity    Alcohol use: No    Drug use: Not Currently     Types: Marijuana    Sexual activity: Yes     Partners: Male     Birth control/protection: Female Sterilization     Comment:  and planned pregnancy. FOB is involved and supportive.       FAMILY HISTORY  Family History   Problem Relation Age of Onset    Diabetes Mother     Hypertension Mother     Diabetes Father     Hypertension Father     No Known Problems Sister     Diabetes Paternal Grandfather     Hypertension Paternal Grandfather           Objective:   PHYSICAL EXAM  VITAL SIGNS: /60   Pulse 65   Temp 36.3 °C (97.3 °F)   Resp 14   Ht 1.702 m (5' 7\")   Wt 88.9 kg (196 lb)   SpO2 98%   BMI 30.70 kg/m²     Gen: no acute distress, normal voice  Skin: dry, intact, moist mucosal membranes  Eyes: No conjunctival injection b/l  Neck: Normal range of motion. No meningeal signs.   ENT: No oropharyngeal erythema or exudates. Uvula midline. TMs clear and intact b/l w/o bulging, erythema or effusion. No lymphadenopathy.  Lungs: No increased work of breathing.  CTAB w/ symmetric expansion  CV: RRR w/o murmurs or clicks  Psych: normal affect, normal judgement, alert, awake    Assessment/Plan:     1. Viral URI with cough  POCT CEPHEID GROUP A STREP - PCR      Signs and symptoms are consistent with a viral respiratory infection and should be self-limiting.  Recommended symptomatic treatment including Motrin, Tylenol, relative rest and fluid hydration. Return to urgent care " any new/worsening symptoms or further questions or concerns.  Patient understood everything discussed.  All questions were answered.        Please note that this dictation was created using voice recognition software. I have made a reasonable attempt to correct obvious errors, but I expect that there are errors of grammar and possibly content that I did not discover before finalizing the note.

## 2025-05-06 ENCOUNTER — OFFICE VISIT (OUTPATIENT)
Dept: URGENT CARE | Facility: PHYSICIAN GROUP | Age: 35
End: 2025-05-06
Payer: COMMERCIAL

## 2025-05-06 VITALS
OXYGEN SATURATION: 95 % | HEART RATE: 68 BPM | BODY MASS INDEX: 31.27 KG/M2 | DIASTOLIC BLOOD PRESSURE: 74 MMHG | SYSTOLIC BLOOD PRESSURE: 118 MMHG | HEIGHT: 67 IN | WEIGHT: 199.2 LBS | RESPIRATION RATE: 18 BRPM | TEMPERATURE: 98.1 F

## 2025-05-06 DIAGNOSIS — N30.00 ACUTE CYSTITIS WITHOUT HEMATURIA: ICD-10-CM

## 2025-05-06 LAB
APPEARANCE UR: CLEAR
BILIRUB UR STRIP-MCNC: NORMAL MG/DL
COLOR UR AUTO: NORMAL
GLUCOSE UR STRIP.AUTO-MCNC: NORMAL MG/DL
KETONES UR STRIP.AUTO-MCNC: NORMAL MG/DL
LEUKOCYTE ESTERASE UR QL STRIP.AUTO: NORMAL
NITRITE UR QL STRIP.AUTO: POSITIVE
PH UR STRIP.AUTO: 7 [PH] (ref 5–8)
PROT UR QL STRIP: NORMAL MG/DL
RBC UR QL AUTO: NORMAL
SP GR UR STRIP.AUTO: 1.01
UROBILINOGEN UR STRIP-MCNC: 0.2 MG/DL

## 2025-05-06 PROCEDURE — 3074F SYST BP LT 130 MM HG: CPT | Performed by: FAMILY MEDICINE

## 2025-05-06 PROCEDURE — 3078F DIAST BP <80 MM HG: CPT | Performed by: FAMILY MEDICINE

## 2025-05-06 PROCEDURE — 99213 OFFICE O/P EST LOW 20 MIN: CPT | Performed by: FAMILY MEDICINE

## 2025-05-06 PROCEDURE — 81002 URINALYSIS NONAUTO W/O SCOPE: CPT | Performed by: FAMILY MEDICINE

## 2025-05-06 RX ORDER — NITROFURANTOIN 25; 75 MG/1; MG/1
100 CAPSULE ORAL 2 TIMES DAILY
Qty: 10 CAPSULE | Refills: 0 | Status: SHIPPED | OUTPATIENT
Start: 2025-05-06 | End: 2025-05-11

## 2025-05-06 NOTE — PROGRESS NOTES
Chief Complaint   Patient presents with    UTI     UTI Symptoms since Last Thursday     Painful Urination    Urinary Frequency         Subjective:      CC:  presents with Dysuria            Dysuria   This is a new problem. The current episode started in the past 3 days. The problem occurs every urination. The problem has been unchanged. The quality of the pain is described as burning. There has been no fever. Pt is not sexually active. There is no history of pyelonephritis. Associated symptoms include frequency and urgency. Pertinent negatives include no chills, discharge, flank pain, nausea or vomiting. Pt has tried nothing for the symptoms. There is no history of recurrent UTIs.     Social History     Socioeconomic History    Marital status:      Spouse name: Not on file    Number of children: Not on file    Years of education: Not on file    Highest education level: Not on file   Occupational History    Not on file   Tobacco Use    Smoking status: Never    Smokeless tobacco: Never   Vaping Use    Vaping status: Never Used   Substance and Sexual Activity    Alcohol use: No    Drug use: Not Currently     Types: Marijuana    Sexual activity: Yes     Partners: Male     Birth control/protection: Female Sterilization     Comment:  and planned pregnancy. FOB is involved and supportive.   Other Topics Concern    Not on file   Social History Narrative    Not on file     Social Drivers of Health     Financial Resource Strain: Not on file   Food Insecurity: Not on file   Transportation Needs: Not on file   Physical Activity: Not on file   Stress: Not on file   Social Connections: Not on file   Intimate Partner Violence: Not on file   Housing Stability: Not on file         Family History   Problem Relation Age of Onset    Diabetes Mother     Hypertension Mother     Diabetes Father     Hypertension Father     No Known Problems Sister     Diabetes Paternal Grandfather     Hypertension Paternal Grandfather   "        No Known Allergies        Current Outpatient Medications on File Prior to Visit   Medication Sig Dispense Refill    measles, mumps and rubella vaccine (M-M-R II) Recon Soln Inject  under the skin. (Patient not taking: Reported on 11/18/2024) 0.5 mL 0    varicella virus vaccine live, PF, (VARIVAX) 1350 PFU/0.5ML injection Inject  under the skin. (Patient not taking: Reported on 11/18/2024) 0.5 mL 0     No current facility-administered medications on file prior to visit.       Review of Systems   Constitutional: Negative for chills.   Respiratory: Negative for shortness of breath.    Cardiovascular: Negative for chest pain.   Gastrointestinal: Negative for nausea, vomiting and abdominal pain.   Genitourinary: Positive for dysuria, urgency and frequency. Negative for flank pain.   Skin: Negative for rash.   Neurological: Negative for dizziness and headaches.   All other systems reviewed and are negative.         Objective:      /74   Pulse 68   Temp 36.7 °C (98.1 °F) (Temporal)   Resp 18   Ht 1.702 m (5' 7\")   Wt 90.4 kg (199 lb 3.2 oz)   SpO2 95%       Physical Exam   Constitutional: pt is oriented to person, place, and time. Pt appears well-developed and well-nourished. No distress.   HENT:   Head: Normocephalic and atraumatic.   Mouth/Throat: Mucous membranes are normal.   Eyes: Conjunctivae and EOM are normal. Pupils are equal, round, and reactive to light. Right eye exhibits no discharge. Left eye exhibits no discharge. No scleral icterus.   Neck: Normal range of motion. Neck supple.   Cardiovascular: Normal rate, regular rhythm, normal heart sounds and intact distal pulses.    No murmur heard.  Pulmonary/Chest: Effort normal and breath sounds normal. No respiratory distress. Pt has no wheezes,  rales.   Abdominal: Bowel sounds are normal. Pt exhibits no distension and no mass. There is no tenderness. There is no rebound, no guarding and no CVA tenderness.   Neurological: pt is alert and " oriented to person, place, and time.   Skin: Skin is warm and dry.   Psychiatric: behavior is normal.   Nursing note and vitals reviewed.           Lab Results   Component Value Date/Time    POCCOLOR orange 05/06/2025 02:58 PM    POCAPPEAR clear 05/06/2025 02:58 PM    POCLEUKEST neg 05/06/2025 02:58 PM    POCNITRITE positive 05/06/2025 02:58 PM    POCUROBILIGE 0.2 05/06/2025 02:58 PM    POCPROTEIN neg 05/06/2025 02:58 PM    POCURPH 7.0 05/06/2025 02:58 PM    POCBLOOD neg 05/06/2025 02:58 PM    POCSPGRV 1.015 05/06/2025 02:58 PM    POCKETONES neg 05/06/2025 02:58 PM    POCBILIRUBIN neg 05/06/2025 02:58 PM    POCGLUCUA neg 05/06/2025 02:58 PM            Assessment/Plan:      1. Acute cystitis without hematuria   UA findings c/w infection    - POCT Urinalysis  - URINE CULTURE(NEW); Future  - nitrofurantoin (MACROBID) 100 MG Cap; Take 1 Capsule by mouth 2 times a day for 5 days.  Dispense: 10 Capsule; Refill: 0         Differential diagnosis, natural history, supportive care, and indications for immediate follow-up discussed. All questions answered. Patient agrees with the plan of care.     Follow-up as needed if symptoms worsen or fail to improve to PCP, Urgent care or Emergency Room.     I have personally reviewed prior external notes and test results pertinent to today's visit.  I have independently reviewed and interpreted all diagnostics ordered during this urgent care acute visit.

## 2025-05-06 NOTE — LETTER
May 6, 2025    To Whom It May Concern:         This is confirmation that Sierra Gramajo Nelly Burroughs attended her scheduled appointment with Hayden Ruiz M.D. on 5/06/25.    Please excuse absence due to illness.         If you have any questions please do not hesitate to call me at the phone number listed below.    Sincerely,          Hayden Ruiz M.D.  [phone]

## 2025-05-07 ENCOUNTER — TELEPHONE (OUTPATIENT)
Dept: URGENT CARE | Facility: PHYSICIAN GROUP | Age: 35
End: 2025-05-07
Payer: COMMERCIAL

## 2025-05-07 NOTE — TELEPHONE ENCOUNTER
Pt was called today regarding urine sample collected yesterday. I left a voicemail informing the patient we need another urine sample so a culture can be pulled and sent out. Sample left yesterday did not make it to the lab.

## 2025-08-24 ENCOUNTER — OFFICE VISIT (OUTPATIENT)
Dept: URGENT CARE | Facility: PHYSICIAN GROUP | Age: 35
End: 2025-08-24
Payer: COMMERCIAL

## 2025-08-24 VITALS
HEIGHT: 67 IN | SYSTOLIC BLOOD PRESSURE: 124 MMHG | HEART RATE: 67 BPM | DIASTOLIC BLOOD PRESSURE: 84 MMHG | RESPIRATION RATE: 16 BRPM | TEMPERATURE: 98.2 F | OXYGEN SATURATION: 97 % | BODY MASS INDEX: 30.13 KG/M2 | WEIGHT: 192 LBS

## 2025-08-24 DIAGNOSIS — L29.9 EAR ITCHING: ICD-10-CM

## 2025-08-24 DIAGNOSIS — K21.9 GASTROESOPHAGEAL REFLUX DISEASE WITHOUT ESOPHAGITIS: ICD-10-CM

## 2025-08-24 DIAGNOSIS — R11.2 NAUSEA AND VOMITING, UNSPECIFIED VOMITING TYPE: Primary | ICD-10-CM

## 2025-08-24 PROCEDURE — 99213 OFFICE O/P EST LOW 20 MIN: CPT | Mod: 25 | Performed by: NURSE PRACTITIONER

## 2025-08-24 PROCEDURE — 3079F DIAST BP 80-89 MM HG: CPT | Performed by: NURSE PRACTITIONER

## 2025-08-24 PROCEDURE — 3074F SYST BP LT 130 MM HG: CPT | Performed by: NURSE PRACTITIONER

## (undated) DEVICE — SUTURE 2-0 CHROMIC GUT CT-1 27 (36PK/BX)"

## (undated) DEVICE — ELECTRODE DUAL RETURN W/ CORD - (50/PK)

## (undated) DEVICE — SUTURE 0 GUT-PLAIN (36PK/BX)

## (undated) DEVICE — SUTURE GENERAL

## (undated) DEVICE — CATHETER IV NON-SAFETY 18 GA X 1 1/4 (50/BX 4BX/CA)

## (undated) DEVICE — DRESSING INTERCEED ABSORBABLE ADHESION BARRIER TC7 (10EA/CA)

## (undated) DEVICE — CHLORAPREP 26 ML APPLICATOR - ORANGE TINT(25/CA)

## (undated) DEVICE — DETERGENT RENUZYME PLUS 10 OZ PACKET (50/BX)

## (undated) DEVICE — TRAY SPINAL ANESTHESIA NON-SAFETY (10/CA)

## (undated) DEVICE — SUTURE 3-0 VICRYL PLUS CT-1 - 36 INCH (36/BX)

## (undated) DEVICE — GLOVE BIOGEL INDICATOR SZ 6.5 SURGICAL PF LTX - (50PR/BX 4BX/CA)

## (undated) DEVICE — WATER IRRIG. STER. 1500 ML - (9/CA)

## (undated) DEVICE — SUTURE 4-0 VICRYL PLUSFS-1 - 27 INCH (36/BX)

## (undated) DEVICE — PACK C-SECTION (2EA/CA)

## (undated) DEVICE — PACK LAP CHOLE OR - (2EA/CA)

## (undated) DEVICE — STERI STRIP COMPOUND BENZOIN - TINCTURE 0.6ML WITH APPLICATOR (40EA/BX)

## (undated) DEVICE — SLEEVE, SEQUENTIAL CALF REG

## (undated) DEVICE — KIT  I.V. START (100EA/CA)

## (undated) DEVICE — SODIUM CHL IRRIGATION 0.9% 1000ML (12EA/CA)

## (undated) DEVICE — GLOVE SZ 7 BIOGEL PI MICRO - PF LF (50PR/BX 4BX/CA)

## (undated) DEVICE — LIGASURE SM JAW SEALER CRVD - (6EA/CA)

## (undated) DEVICE — GLOVE BIOGEL ECLIPSE PF LATEX SIZE 6.0 (50PR/BX)

## (undated) DEVICE — TOWEL STOP TIMEOUT SAFETY FLAG (40EA/CA)

## (undated) DEVICE — SET LEADWIRE 5 LEAD BEDSIDE DISPOSABLE ECG (1SET OF 5/EA)

## (undated) DEVICE — STAPLER SKIN DISP - (6/BX 10BX/CA) VISISTAT

## (undated) DEVICE — SLEEVE VASO CALF MED - (10PR/CA)

## (undated) DEVICE — HEAD HOLDER JUNIOR/ADULT

## (undated) DEVICE — SCISSORS 5MM CVD (6EA/BX)

## (undated) DEVICE — GLOVE BIOGEL PI INDICATOR SZ 7.5 SURGICAL PF LF -(50/BX 4BX/CA)

## (undated) DEVICE — SUTURE 1 VICRYL PLUS CTX - 36 INCH (36/BX)

## (undated) DEVICE — SENSOR OXIMETER ADULT SPO2 RD SET (20EA/BX)

## (undated) DEVICE — CANNULA W/SEAL 5X100 Z-THRE - ADED KII (12/BX)

## (undated) DEVICE — PAD LAP STERILE 18 X 18 - (5/PK 40PK/CA)

## (undated) DEVICE — SUTURE 0 VICRYL PLUS CT-1 - 36 INCH (36/BX)

## (undated) DEVICE — TUBING CLEARLINK DUO-VENT - C-FLO (48EA/CA)

## (undated) DEVICE — GLOVE BIOGEL SZ 6.5 SURGICAL PF LTX (50PR/BX 4BX/CA)

## (undated) DEVICE — BLANKET UNDERBODY FULL ACCES - (5/CA)

## (undated) DEVICE — SUCTION INSTRUMENT YANKAUER BULBOUS TIP W/O VENT (50EA/CA)

## (undated) DEVICE — CANISTER SUCTION 3000ML MECHANICAL FILTER AUTO SHUTOFF MEDI-VAC NONSTERILE LF DISP  (40EA/CA)

## (undated) DEVICE — DEVICE 5MM ABSRB STRAP FIXATION  (6EA/BX)

## (undated) DEVICE — TUBE E-T HI-LO CUFF 7.0MM (10EA/PK)

## (undated) DEVICE — SET EXTENSION WITH 2 PORTS (48EA/CA) ***PART #2C8610 IS A SUBSTITUTE*****

## (undated) DEVICE — SUTURE 4-0 MONOCRYL PLUS PS-2 - 27 INCH (36/BX)

## (undated) DEVICE — DRAPESURG STERI-DRAPE LONG - (10/BX 4BX/CA)

## (undated) DEVICE — SUTURE 0 VICRYL PLUS CT 36 (36PK/BX)"

## (undated) DEVICE — BANDAID SHEER STRIP 3/4 IN (100EA/BX 12BX/CA)

## (undated) DEVICE — LACTATED RINGERS INJ 1000 ML - (14EA/CA 60CA/PF)

## (undated) DEVICE — SUTURE 1 CHROMIC CTX ETHICON - (36PK/BX)

## (undated) DEVICE — GLOVE BIOGEL PI INDICATOR SZ 7.0 SURGICAL PF LF - (50/BX 4BX/CA)

## (undated) DEVICE — COVER LIGHT HANDLE ALC PLUS DISP (18EA/BX)

## (undated) DEVICE — GLOVE SZ 6.5 BIOGEL PI MICRO - PF LF (50PR/BX)

## (undated) DEVICE — TROCAR 5X100 NON BLADED Z-TH - READ KII (6/BX)

## (undated) DEVICE — SUTURE 1 CHROMIC GUTCT-1 27 (36PK/BX)"

## (undated) DEVICE — GLOVE BIOGEL SZ 8 SURGICAL PF LTX - (50PR/BX 4BX/CA)

## (undated) DEVICE — TAPE CLOTH MEDIPORE 6 INCH - (12RL/CA)

## (undated) DEVICE — TROCAR Z THREAD12MM OPTICAL - NON BLADED (6/BX)

## (undated) DEVICE — WATER IRRIGATION STERILE 1000ML (12EA/CA)

## (undated) DEVICE — SUTURE 4-0 MONOCRYL PLUS PS-1 - 27 INCH (36/BX)

## (undated) DEVICE — SET TUBING PNEUMOCLEAR HIGH FLOW SMOKE EVACUATION (10EA/BX)

## (undated) DEVICE — GOWN WARMING STANDARD FLEX - (30/CA)

## (undated) DEVICE — CLOSURE SKIN STRIP 1/2 X 4 IN - (STERI STRIP) (50/BX 4BX/CA)

## (undated) DEVICE — BINDER ABDOMINAL 30X45X12IN - FITS 30-45IN WAIST 12IN HIGH

## (undated) DEVICE — TUBE NG SALEM SUMP 16FR (50EA/CA)

## (undated) DEVICE — BAG, SPONGE COUNT 50600